# Patient Record
Sex: MALE | Race: WHITE | NOT HISPANIC OR LATINO | Employment: OTHER | URBAN - METROPOLITAN AREA
[De-identification: names, ages, dates, MRNs, and addresses within clinical notes are randomized per-mention and may not be internally consistent; named-entity substitution may affect disease eponyms.]

---

## 2017-02-05 DIAGNOSIS — E78.5 DYSLIPIDEMIA: ICD-10-CM

## 2017-02-05 RX ORDER — ROSUVASTATIN CALCIUM 10 MG/1
10 TABLET, COATED ORAL EVERY EVENING
Qty: 90 TAB | Refills: 4 | Status: SHIPPED | OUTPATIENT
Start: 2017-02-05 | End: 2017-03-15 | Stop reason: SDUPTHER

## 2017-02-13 DIAGNOSIS — N39.0 URINARY TRACT INFECTION, SITE UNSPECIFIED: ICD-10-CM

## 2017-02-13 RX ORDER — NITROFURANTOIN 25; 75 MG/1; MG/1
100 CAPSULE ORAL 2 TIMES DAILY
Qty: 20 CAP | Refills: 3 | Status: SHIPPED | OUTPATIENT
Start: 2017-02-13 | End: 2017-05-01

## 2017-03-13 ENCOUNTER — HOSPITAL ENCOUNTER (OUTPATIENT)
Facility: MEDICAL CENTER | Age: 67
End: 2017-03-13
Attending: FAMILY MEDICINE
Payer: OTHER GOVERNMENT

## 2017-03-13 ENCOUNTER — NON-PROVIDER VISIT (OUTPATIENT)
Dept: INTERNAL MEDICINE | Facility: IMAGING CENTER | Age: 67
End: 2017-03-13
Payer: OTHER GOVERNMENT

## 2017-03-13 DIAGNOSIS — N40.1 BENIGN NON-NODULAR PROSTATIC HYPERPLASIA WITH LOWER URINARY TRACT SYMPTOMS: ICD-10-CM

## 2017-03-13 LAB
APPEARANCE UR: NORMAL
BILIRUB UR STRIP-MCNC: NEGATIVE MG/DL
COLOR UR AUTO: YELLOW
GLUCOSE UR STRIP.AUTO-MCNC: NEGATIVE MG/DL
KETONES UR STRIP.AUTO-MCNC: NEGATIVE MG/DL
LEUKOCYTE ESTERASE UR QL STRIP.AUTO: NORMAL
NITRITE UR QL STRIP.AUTO: NEGATIVE
PH UR STRIP.AUTO: 6 [PH] (ref 5–8)
PROT UR QL STRIP: NEGATIVE MG/DL
RBC UR QL AUTO: NORMAL
SP GR UR STRIP.AUTO: 1.01
UROBILINOGEN UR STRIP-MCNC: NEGATIVE MG/DL

## 2017-03-13 PROCEDURE — 87077 CULTURE AEROBIC IDENTIFY: CPT

## 2017-03-13 PROCEDURE — 87086 URINE CULTURE/COLONY COUNT: CPT

## 2017-03-13 PROCEDURE — 87186 SC STD MICRODIL/AGAR DIL: CPT

## 2017-03-13 PROCEDURE — 81002 URINALYSIS NONAUTO W/O SCOPE: CPT | Performed by: FAMILY MEDICINE

## 2017-03-15 ENCOUNTER — OFFICE VISIT (OUTPATIENT)
Dept: INTERNAL MEDICINE | Facility: IMAGING CENTER | Age: 67
End: 2017-03-15
Payer: OTHER GOVERNMENT

## 2017-03-15 VITALS
WEIGHT: 225 LBS | OXYGEN SATURATION: 97 % | BODY MASS INDEX: 27.4 KG/M2 | RESPIRATION RATE: 12 BRPM | DIASTOLIC BLOOD PRESSURE: 82 MMHG | SYSTOLIC BLOOD PRESSURE: 142 MMHG | TEMPERATURE: 97.5 F | HEIGHT: 76 IN | HEART RATE: 64 BPM

## 2017-03-15 DIAGNOSIS — E78.5 DYSLIPIDEMIA: ICD-10-CM

## 2017-03-15 DIAGNOSIS — N31.2 HYPOTONIC BLADDER: Primary | ICD-10-CM

## 2017-03-15 DIAGNOSIS — J30.1 NON-SEASONAL ALLERGIC RHINITIS DUE TO POLLEN: ICD-10-CM

## 2017-03-15 DIAGNOSIS — N40.1 BENIGN NON-NODULAR PROSTATIC HYPERPLASIA WITH LOWER URINARY TRACT SYMPTOMS: ICD-10-CM

## 2017-03-15 PROCEDURE — 99214 OFFICE O/P EST MOD 30 MIN: CPT | Performed by: FAMILY MEDICINE

## 2017-03-15 RX ORDER — TADALAFIL 5 MG/1
5 TABLET ORAL DAILY
Qty: 90 TAB | Refills: 4 | Status: SHIPPED | OUTPATIENT
Start: 2017-03-15 | End: 2018-11-29 | Stop reason: SDUPTHER

## 2017-03-15 RX ORDER — MOMETASONE FUROATE 50 UG/1
4 SPRAY, METERED NASAL DAILY
Qty: 3 INHALER | Refills: 4 | Status: SHIPPED | OUTPATIENT
Start: 2017-03-15 | End: 2019-08-05

## 2017-03-15 RX ORDER — ROSUVASTATIN CALCIUM 10 MG/1
10 TABLET, COATED ORAL EVERY EVENING
Qty: 90 TAB | Refills: 4
Start: 2017-03-15 | End: 2017-11-01 | Stop reason: SDUPTHER

## 2017-03-15 NOTE — MR AVS SNAPSHOT
"        Checo Hughes   3/15/2017 2:00 PM   Office Visit   MRN: [id number]    Department:  [facility]   Dept Phone:  [phone]    Description:  Male : 1950   Provider:  Trav Cooley M.D.           Allergies as of 3/15/2017     No Known Allergies      You were diagnosed with     Dyslipidemia   [131060]       Benign non-nodular prostatic hyperplasia with lower urinary tract symptoms   [1235475]       Non-seasonal allergic rhinitis due to pollen   [6668054]         Vital Signs     Blood Pressure Pulse Temperature Respirations Height Weight    142/82 mmHg 64 36.4 °C (97.5 °F) 12 1.93 m (6' 4\") 102.059 kg (225 lb)    Body Mass Index Oxygen Saturation Smoking Status             27.40 kg/m2 97% Never Smoker          Basic Information     Date Of Birth Sex Race Ethnicity Preferred Language    1950 Male White Non- English      Problem List              ICD-10-CM Priority Class Noted - Resolved    Sinus complaint R09.89   2012 - Present    Finger joint stiff M25.649   2012 - Present    BPH with obstruction/lower urinary tract symptoms N40.1, N13.8   2012 - Present    Allergic rhinitis due to pollen J30.1   2012 - Present    Other malaise and fatigue R53.81, R53.83   2012 - Present    Encounter for therapeutic drug monitoring Z51.81   2012 - Present    Special screening for malignant neoplasm of prostate Z12.5   2012 - Present    Hyperlipidemia E78.5   Unknown - Present    Eustachian tube dysfunction H69.80   Unknown - Present    Elevated blood pressure reading without diagnosis of hypertension R03.0   Unknown - Present    Diverticulosis K57.90   10/8/2013 - Present    Mixed hyperlipidemia with apolipoprotein E3 variant E78.2   2014 - Present    Carotid atherosclerosis I65.29   2014 - Present    Hypothyroidism E03.9   2014 - Present    Inflamed seborrheic keratosis L82.0   2014 - Present    Left-sided low back pain with sciatica " M54.42   2/2/2015 - Present    Acute urinary retention R33.8   6/12/2015 - Present    UTI (urinary tract infection) N39.0   6/12/2015 - Present    1St degree AV block I44.0   7/14/2015 - Present    S/P TURP (status post transurethral resection of prostate) Z90.79   1/29/2016 - Present    BPH (benign prostatic hypertrophy) N40.0   1/29/2016 - Present    Thickened cIMT R93.8   1/29/2016 - Present    Other specified hypothyroidism E03.8   1/29/2016 - Present    Vitamin D deficiency E55.9   1/29/2016 - Present      Health Maintenance        Date Due Completion Dates    COLONOSCOPY 9/24/2023 9/24/2013 (Done)    Override on 9/24/2013: Done    IMM DTaP/Tdap/Td Vaccine (2 - Td) 9/30/2023 9/30/2013            Current Immunizations     Influenza TIV (IM) 11/1/2013    Influenza Vaccine Quad Inj (Preserved) 9/26/2016, 10/7/2015    SHINGLES VACCINE 9/30/2013    Tdap Vaccine 9/30/2013      Below and/or attached are the medications your provider expects you to take. Review all of your home medications and newly ordered medications with your provider and/or pharmacist. Follow medication instructions as directed by your provider and/or pharmacist. Please keep your medication list with you and share with your provider. Update the information when medications are discontinued, doses are changed, or new medications (including over-the-counter products) are added; and carry medication information at all times in the event of emergency situations     Allergies:  No Known Allergies          Medications  Valid as of: March 16, 2017 -  7:43 AM    Generic Name Brand Name Tablet Size Instructions for use    Aspirin (Tablet Delayed Response) ECOTRIN 81 MG Take 1 Tab by mouth every day.        Cholecalciferol (Cap) Cholecalciferol 1000 UNIT Take 1 Cap by mouth every day.        Levothyroxine Sodium (Tab) SYNTHROID 75 MCG TAKE ONE-HALF (1/2) TABLET DAILY        Mometasone Furoate (Suspension) NASONEX 50 MCG/ACT Spray 4 Sprays in nose every day.          Nitrofurantoin Monohyd Macro (Cap) MACROBID 100 MG Take 1 Cap by mouth 2 times a day.        NON SPECIFIED   Take 1 Each by mouth 2 Times a Day. Omega 3 Fish Oil (735 EPA/ 165 DHA)        Rosuvastatin Calcium (Tab) CRESTOR 10 MG Take 1 Tab by mouth every evening.        Tadalafil (Tab) CIALIS 5 MG Take 1 Tab by mouth every day.        .                 Medicines prescribed today were sent to:     [facility] - [place], [place] - [address]    [address] [address]    Phone: [phone] Fax: [phone]    Open 24 Hours?: No    [facility] [place], [place] - [address] AT [address]    [address] [address]    Phone: [phone] Fax: [phone]    Open 24 Hours?: No    [id number] - [place], [place] - [address]    [address] [place] [address]    Phone: [phone] Fax: [phone]    Open 24 Hours?: No      Medication refill instructions:       If your prescription bottle indicates you have medication refills left, it is not necessary to call your provider’s office. Please contact your pharmacy and they will refill your medication.    If your prescription bottle indicates you do not have any refills left, you may request refills at any time through one of the following ways: The online [employer] system (except Urgent Care), by calling your provider’s office, or by asking your pharmacy to contact your provider’s office with a refill request. Medication refills are processed only during regular business hours and may not be available until the next business day. Your provider may request additional information or to have a follow-up visit with you prior to refilling your medication.   *Please Note: Medication refills are assigned a new Rx number when refilled electronically. Your pharmacy may indicate that no refills were authorized even though a new prescription for the same medication  is available at the pharmacy. Please request the medicine by name with the pharmacy before contacting your provider for a refill.        Other Notes About Your Plan     Colonoscopy-9/2013  Dexa  PSA  12/2/14  [facility]       PREFERRED PHARMACY: [facility]    [address] [address] [place] [address]     P- [phone]           F- [phone]   (is not listed in Epic drop down)  [facility] fax [phone]           MyChart Access Code: Activation code not generated  Current [employer] Status: Active

## 2017-03-15 NOTE — PROGRESS NOTES
SUBJECTIVE:    Chief Complaint   Patient presents with   • Other     Hypotonic bladder   • Allergic Rhinitis   • Hyperlipidemia       Checo Hughes is a 66 y.o. male,   Established Patient     PROBLEM #1-HISTORY OF PRESENT ILLNESS  Existing Problem, but requiring re-evaluation  PATIENT STATEMENT OF PROBLEM - Hypotonic bladder with chronic urinary retention  ONSET - year+  COURSE - he is self catheterizing every 48 hours or so, to help him get a good night sleep.  He has an abnormal UA, and recent culture was positive.  I placed a call to his Urologist to discuss. Patient denies dysuria.   INTENSITY/STATUS/LOCATION/RADIATION - present. Approximately 450 ml residual when he does catheterize.   AGGRAVATORS - BPH, other?   RELIEVERS - ?  TREATMENTS/COMPLIANCE/@GOAL? - as above/ ? / no    PROBLEM #2-HISTORY OF PRESENT ILLNESS  Existing Problem, but requiring re-evaluation  PATIENT STATEMENT OF PROBLEM - Allergic rhinitis  ONSET - years  COURSE - flonase can cause nasal irritation. Counseled.     PROBLEM #3-HISTORY OF PRESENT ILLNESS  Existing Problem, but requiring re-evaluation  PATIENT STATEMENT OF PROBLEM - Dyslipidemia  ONSET - years  COURSE - currently takes Rosuvastatin 10 mg/day, with some mild arm myalgias. Counseled. No CVD/CeVD event history.     No Known Allergies    Patient Active Problem List    Diagnosis Date Noted   • 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/2017   • Benign non-nodular prostatic hyperplasia with lower urinary tract symptoms 03/16/2017   • Non-seasonal allergic rhinitis due to pollen 03/16/2017   • Dyslipidemia 03/16/2017   • S/P TURP (status post transurethral resection of prostate) 01/29/2016   • BPH (benign prostatic hypertrophy) 01/29/2016   • Thickened cIMT 01/29/2016   • Other specified hypothyroidism 01/29/2016   • Vitamin D deficiency 01/29/2016   • 1St degree AV block 07/14/2015   • Acute urinary retention 06/12/2015   • UTI (urinary tract infection) 06/12/2015   • Left-sided low back pain  with sciatica 02/02/2015   • Carotid atherosclerosis 12/08/2014   • Hypothyroidism 12/08/2014   • Inflamed seborrheic keratosis 12/08/2014   • Mixed hyperlipidemia with apolipoprotein E3 variant 11/24/2014   • Diverticulosis 10/08/2013   • Hyperlipidemia    • Eustachian tube dysfunction    • Elevated blood pressure reading without diagnosis of hypertension    • Sinus complaint 11/07/2012   • Finger joint stiff 11/07/2012   • BPH with obstruction/lower urinary tract symptoms 11/07/2012   • Allergic rhinitis due to pollen 11/07/2012   • Other malaise and fatigue 11/07/2012   • Encounter for therapeutic drug monitoring 11/07/2012   • Special screening for malignant neoplasm of prostate 11/07/2012       Outpatient Encounter Prescriptions as of 3/15/2017   Medication Sig Dispense Refill   • rosuvastatin (CRESTOR) 10 MG Tab Take 1 Tab by mouth every evening. 90 Tab 4   • tadalafil (CIALIS) 5 MG tablet Take 1 Tab by mouth every day. 90 Tab 4   • mometasone (NASONEX) 50 MCG/ACT nasal spray Spray 4 Sprays in nose every day. 3 Inhaler 4   • nitrofurantoin monohydr macro (MACROBID) 100 MG Cap Take 1 Cap by mouth 2 times a day. 20 Cap 3   • Cholecalciferol 1000 UNIT Cap Take 1 Cap by mouth every day.     • aspirin EC (ECOTRIN) 81 MG TBEC Take 1 Tab by mouth every day.     • [DISCONTINUED] rosuvastatin (CRESTOR) 10 MG Tab Take 1 Tab by mouth every evening. 90 Tab 4   • levothyroxine (SYNTHROID) 75 MCG Tab TAKE ONE-HALF (1/2) TABLET DAILY 45 Tab 3   • [DISCONTINUED] fluticasone (FLONASE) 50 MCG/ACT nasal spray USE 2 SPRAYS IN EACH NOSTRIL DAILY 3 Bottle 4   • NON SPECIFIED Take 1 Each by mouth 2 Times a Day. Omega 3 Fish Oil (735 EPA/ 165 DHA)       No facility-administered encounter medications on file as of 3/15/2017.       Social History   Substance Use Topics   • Smoking status: Never Smoker    • Smokeless tobacco: Never Used   • Alcohol Use: None       Family History   Problem Relation Age of Onset   • Heart Disease Father   "      Patient's Past, Social, and Family History reviewed and updated by me in EPIC today.    REVIEW OF SYMPTOMS:               Pertinent Positives as above.    All other systems reviewed and negative.     OBJECTIVE:    /82 mmHg  Pulse 64  Temp(Src) 36.4 °C (97.5 °F)  Resp 12  Ht 1.93 m (6' 4\")  Wt 102.059 kg (225 lb)  BMI 27.40 kg/m2  SpO2 97%  Body mass index is 27.4 kg/(m^2).    Well developed, well nourished male, no acute distress, non-ill appearing. Comfortable, appears stated age, pleasant and cooperative  HEAD: atraumatic, normocephalic   EYES: Conjunctiva normal, EOMI, PERRLA, acuity grossly intact.   EARS/NOSE/THROAT: TM's normal, no SSX of infection, no perforation, no hemotympanum, acuity grossly intact. Oropharynx: benign, no lesions noted. Nares: benign.   NECK: supple, no adenopathy, no thyromegaly or nodules, no JVD, no carotid bruits.   CHEST/LUNGS: clear to auscultation and percussion bilaterally. No adventitious breath sounds.   CARDIOVASCULAR: regular rate and rhythm, no murmur. PMI not displaced. Good central and peripheral pulses.   BACK: no CVA tenderness.   ABDOMEN: soft, non-tender, non-distended, no masses, no hepatosplenomegaly. Normal active bowel tones.   : deferred.   Rectal: deferred.   Extremities: warm/well-perfused, no cyanosis, clubbing, or edema.   SKIN: clear, unbroken, no rashes, normal turgor.   Neuro: Mental Status: Alert and Oriented x 3. CN II-XII grossly intact. Gait normal. Non-focal, intact. Normal strength, sensation    ASSESSMENT:    1. Hypotonic bladder     2. Benign non-nodular prostatic hyperplasia with lower urinary tract symptoms  tadalafil (CIALIS) 5 MG tablet   3. Non-seasonal allergic rhinitis due to pollen  mometasone (NASONEX) 50 MCG/ACT nasal spray   4. Dyslipidemia  rosuvastatin (CRESTOR) 10 MG Tab       PLAN:    Total Face-to-Face time spent with patient: 30 minutes  Amount of time spent counseling patient and/or coordinating care: 20 " minutes    The nature of patient counseling as below:  -Patient Education, including below topics:  -Differential Diagnoses and treatment options discussed  -Risks, benefits, alternatives discussed  -Labs reviewed with patient in detail  -Therapeutic Lifestyle Changes discussed    The nature of coordination of care as below:  -Medications added: Cialis for daily use. Nasonex  -Medications discontinued: Flonase  -Medications adjusted: Rosuvastatin reduced to 5 mg/day  -Continue (other) present chronic medications  -Referrals: I have a call placed to his Urologist, Dr. Gary Turcios.   -Other: none  -Seek medical attention immediately if worse    FOLLOW-UP:  -in 2-3 months at my [facility]  -and sooner if test/consult results warrant  And for Health Care Maintenance Exams  And as needed.

## 2017-03-16 ENCOUNTER — TELEPHONE (OUTPATIENT)
Dept: INTERNAL MEDICINE | Facility: IMAGING CENTER | Age: 67
End: 2017-03-16

## 2017-03-16 PROBLEM — N40.1 BENIGN NON-NODULAR PROSTATIC HYPERPLASIA WITH LOWER URINARY TRACT SYMPTOMS: Status: ACTIVE | Noted: 2017-03-16

## 2017-03-16 PROBLEM — J30.1 NON-SEASONAL ALLERGIC RHINITIS DUE TO POLLEN: Status: ACTIVE | Noted: 2017-03-16

## 2017-03-16 PROBLEM — N31.2 HYPOTONIC BLADDER: Status: ACTIVE | Noted: 2017-03-16

## 2017-03-16 PROBLEM — E78.5 DYSLIPIDEMIA: Status: ACTIVE | Noted: 2017-03-16

## 2017-03-16 LAB
BACTERIA UR CULT: ABNORMAL
SIGNIFICANT IND 70042: ABNORMAL
SOURCE SOURCE: ABNORMAL

## 2017-03-16 NOTE — TELEPHONE ENCOUNTER
I discussed Checo's case with his Urologist, Dr. Gary Turcios, today, and explained about the positive urine culture with no dysuria, and the patient's every other day self catheterization.  Dr. Turcios recommended not treating with abx at this time, and to only check urine cultures if patient is symptomatic.  His advice was also to either self-catheterize 4-6 times per day, or not all all, but the every 48 hour catheterization is likely going to cause other problems.  Dr. Turcios will contact patient to arrange repeat appointment for Emre vigil.   Sincerely,  Trav Cooley MD

## 2017-03-16 NOTE — PATIENT INSTRUCTIONS
Current Outpatient Prescriptions Ordered in [facility]   Medication Sig Dispense Refill   • rosuvastatin (CRESTOR) 10 MG Tab Take 1 Tab by mouth every evening. 90 Tab 4   • tadalafil (CIALIS) 5 MG tablet Take 1 Tab by mouth every day. 90 Tab 4   • mometasone (NASONEX) 50 MCG/ACT nasal spray Spray 4 Sprays in nose every day. 3 Inhaler 4   • nitrofurantoin monohydr macro (MACROBID) 100 MG Cap Take 1 Cap by mouth 2 times a day. 20 Cap 3   • Cholecalciferol 1000 UNIT Cap Take 1 Cap by mouth every day.     • aspirin EC (ECOTRIN) 81 MG TBEC Take 1 Tab by mouth every day.     • levothyroxine (SYNTHROID) 75 MCG Tab TAKE ONE-HALF (1/2) TABLET DAILY 45 Tab 3   • NON SPECIFIED Take 1 Each by mouth 2 Times a Day. Omega 3 Fish Oil (735 EPA/ 165 DHA)       No current Epic-ordered facility-administered medications on file.

## 2017-05-01 ENCOUNTER — HOSPITAL ENCOUNTER (OUTPATIENT)
Facility: MEDICAL CENTER | Age: 67
End: 2017-05-01
Attending: FAMILY MEDICINE
Payer: COMMERCIAL

## 2017-05-01 ENCOUNTER — OFFICE VISIT (OUTPATIENT)
Dept: OTHER | Facility: MEDICAL CENTER | Age: 67
End: 2017-05-01
Payer: MEDICARE

## 2017-05-01 VITALS
RESPIRATION RATE: 12 BRPM | TEMPERATURE: 98.6 F | BODY MASS INDEX: 27.35 KG/M2 | HEIGHT: 75 IN | OXYGEN SATURATION: 96 % | WEIGHT: 220 LBS | SYSTOLIC BLOOD PRESSURE: 122 MMHG | HEART RATE: 57 BPM | DIASTOLIC BLOOD PRESSURE: 78 MMHG

## 2017-05-01 DIAGNOSIS — E78.2 MIXED HYPERLIPIDEMIA WITH APOLIPOPROTEIN E3 VARIANT: Primary | ICD-10-CM

## 2017-05-01 DIAGNOSIS — R93.89 ABNORMAL CAROTID ULTRASOUND: ICD-10-CM

## 2017-05-01 DIAGNOSIS — E55.9 VITAMIN D DEFICIENCY: ICD-10-CM

## 2017-05-01 DIAGNOSIS — E66.3 OVERWEIGHT (BMI 25.0-29.9): ICD-10-CM

## 2017-05-01 DIAGNOSIS — I65.29 CAROTID ATHEROSCLEROSIS, UNSPECIFIED LATERALITY: ICD-10-CM

## 2017-05-01 DIAGNOSIS — J30.9 ALLERGIC RHINITIS, UNSPECIFIED ALLERGIC RHINITIS TRIGGER, UNSPECIFIED RHINITIS SEASONALITY: ICD-10-CM

## 2017-05-01 DIAGNOSIS — Z00.00 WELL ADULT EXAM: ICD-10-CM

## 2017-05-01 DIAGNOSIS — N40.1 BENIGN NON-NODULAR PROSTATIC HYPERPLASIA WITH LOWER URINARY TRACT SYMPTOMS: ICD-10-CM

## 2017-05-01 DIAGNOSIS — E03.9 HYPOTHYROIDISM, UNSPECIFIED TYPE: ICD-10-CM

## 2017-05-01 DIAGNOSIS — M72.0 DUPUYTREN'S CONTRACTURE OF LEFT HAND: ICD-10-CM

## 2017-05-01 PROCEDURE — 99214 OFFICE O/P EST MOD 30 MIN: CPT | Performed by: FAMILY MEDICINE

## 2017-05-01 PROCEDURE — 1101F PT FALLS ASSESS-DOCD LE1/YR: CPT | Mod: 8P | Performed by: FAMILY MEDICINE

## 2017-05-01 PROCEDURE — G8598 ASA/ANTIPLAT THER USED: HCPCS | Performed by: FAMILY MEDICINE

## 2017-05-01 PROCEDURE — 4040F PNEUMOC VAC/ADMIN/RCVD: CPT | Mod: 8P | Performed by: FAMILY MEDICINE

## 2017-05-01 PROCEDURE — G8419 CALC BMI OUT NRM PARAM NOF/U: HCPCS | Performed by: FAMILY MEDICINE

## 2017-05-01 PROCEDURE — 1036F TOBACCO NON-USER: CPT | Performed by: FAMILY MEDICINE

## 2017-05-01 PROCEDURE — G8432 DEP SCR NOT DOC, RNG: HCPCS | Performed by: FAMILY MEDICINE

## 2017-05-01 RX ORDER — AZELASTINE 1 MG/ML
1 SPRAY, METERED NASAL 2 TIMES DAILY
Qty: 3 BOTTLE | Refills: 4 | Status: SHIPPED | OUTPATIENT
Start: 2017-05-01 | End: 2019-08-05

## 2017-05-01 RX ORDER — MONTELUKAST SODIUM 10 MG/1
10 TABLET ORAL DAILY
Qty: 90 TAB | Refills: 4 | Status: SHIPPED | OUTPATIENT
Start: 2017-05-01 | End: 2018-05-29

## 2017-05-01 ASSESSMENT — PAIN SCALES - GENERAL: PAINLEVEL: NO PAIN

## 2017-05-01 NOTE — PATIENT INSTRUCTIONS
Current Outpatient Prescriptions Ordered in [facility]   Medication Sig Dispense Refill   • azelastine (ASTELIN) 137 MCG/SPRAY nasal spray [place] 1 Spray in nose 2 times a day. 3 Bottle 4   • montelukast (SINGULAIR) 10 MG Tab Take 1 Tab by mouth every day. 90 Tab 4   • rosuvastatin (CRESTOR) 10 MG Tab Take 1 Tab by mouth every evening. 90 Tab 4   • tadalafil (CIALIS) 5 MG tablet Take 1 Tab by mouth every day. 90 Tab 4   • levothyroxine (SYNTHROID) 75 MCG Tab TAKE ONE-HALF (1/2) TABLET DAILY 45 Tab 3   • Cholecalciferol 1000 UNIT Cap Take 1 Cap by mouth every day.     • aspirin EC (ECOTRIN) 81 MG TBEC Take 1 Tab by mouth every day.     • mometasone (NASONEX) 50 MCG/ACT nasal spray Spray 4 Sprays in nose every day. 3 Inhaler 4   • NON SPECIFIED Take 1 Each by mouth 2 Times a Day. Omega 3 Fish Oil (735 EPA/ 165 DHA)       No current Epic-ordered facility-administered medications on file.

## 2017-05-01 NOTE — PROGRESS NOTES
SUBJECTIVE:    Chief Complaint   Patient presents with   • Hyperlipidemia     & subclinical carotid atherosclerosis   • Hypothyroidism   • Allergic Rhinitis   • Dupuyten's Contraction   • Vitamin D Deficiency       Checo Hughes is a 67 y.o. male,   New Patient to my [facility] (but previously established at my [facility].)    PROBLEM #1-HISTORY OF PRESENT ILLNESS  Existing Problem, but requiring re-evaluation  PATIENT STATEMENT OF PROBLEM - Dyslipidemia, subclinical carotid atherosclerosis, and thickened cIMT  ONSET - years  COURSE - asymptomatic. No CVD/CeVD event history.  He has been on Rosuvastatin 10 mg/day for past 3 months (he was on 5 mg/day for a few months but Tchol christine, so he resumed 10 mg/day). Zetia possibly caused worsening of his allergic rhinitis/sinus symptoms, so he has been off that for at least 3 months.    INTENSITY/STATUS/LOCATION/RADIATION - pending labs  AGGRAVATORS - Multifactorial   RELIEVERS - meds, Therapeutic Lifestyle Changes   TREATMENTS/COMPLIANCE/@GOAL? - same/ good/ pending labs; clinically @ goal    PROBLEM #2-HISTORY OF PRESENT ILLNESS  Existing Problem, but requiring re-evaluation  PATIENT STATEMENT OF PROBLEM - Hypothyroidism  ONSET - years  COURSE - he feels he is euthyroid, due for labs. He takes levothyroxine 37.5 mg/day    PROBLEM #3-HISTORY OF PRESENT ILLNESS  Existing Problem, but requiring re-evaluation  PATIENT STATEMENT OF PROBLEM - Allergic rhinitis and chronic cough/throat clearing  ONSET - months+  COURSE - Flonase was ineffective, and may have caused headaches.  He tried Nasonex w/o success. He denies GERD symptoms, but he is aware that cough could still be GERD related.   INTENSITY/STATUS/LOCATION/RADIATION - mild but annoying  AGGRAVATORS - Multifactorial. He feels the etiology is allergies/sinuses  RELIEVERS - ?  TREATMENTS/COMPLIANCE/@GOAL? - as above/ good/ no     PROBLEM #4-HISTORY OF PRESENT  ILLNESS  Existing Problem, but not previously addressed by me  PATIENT STATEMENT OF PROBLEM - L hand Dupuytren's contracture  ONSET - years  COURSE - slowly worsening, affecting L middle and L ring fingers.  He cannot extend those fingers completely, but he does not feel the symptoms warrant surgery at this time.  He is willing to see a Hand Specialist to discuss options.   INTENSITY/STATUS/LOCATION/RADIATION - mild to moderate/ worsening/ as above  AGGRAVATORS - time  RELIEVERS - none  TREATMENTS/COMPLIANCE/@GOAL? - none/ na/ no     PROBLEM #5-HISTORY OF PRESENT ILLNESS  Existing Problem, but requiring re-evaluation  PATIENT STATEMENT OF PROBLEM - Vitamin D deficiency  ONSET - years  COURSE - he takes Vitamin D 1000 IU/day. Due for labs. Asymptomatic.     No Known Allergies    Patient Active Problem List    Diagnosis Date Noted   • Allergic rhinitis 05/01/2017   • Dupuytren's contracture of left hand 05/01/2017   • Well adult exam 05/01/2017   • Overweight (BMI 25.0-29.9) 05/01/2017   • 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/2017   • Benign non-nodular prostatic hyperplasia with lower urinary tract symptoms 03/16/2017   • Non-seasonal allergic rhinitis due to pollen 03/16/2017   • Dyslipidemia 03/16/2017   • S/P TURP (status post transurethral resection of prostate) 01/29/2016   • BPH (benign prostatic hypertrophy) 01/29/2016   • Thickened cIMT 01/29/2016   • Other specified hypothyroidism 01/29/2016   • Vitamin D deficiency 01/29/2016   • 1St degree AV block 07/14/2015   • Acute urinary retention 06/12/2015   • UTI (urinary tract infection) 06/12/2015   • Left-sided low back pain with sciatica 02/02/2015   • Carotid atherosclerosis 12/08/2014   • Hypothyroidism 12/08/2014   • Inflamed seborrheic keratosis 12/08/2014   • Mixed hyperlipidemia with apolipoprotein E3 variant 11/24/2014   • Diverticulosis 10/08/2013   • Hyperlipidemia    • Eustachian tube dysfunction    • Elevated blood pressure reading without diagnosis of  "hypertension    • Sinus complaint 11/07/2012   • Finger joint stiff 11/07/2012   • BPH with obstruction/lower urinary tract symptoms 11/07/2012   • Allergic rhinitis due to pollen 11/07/2012   • Other malaise and fatigue 11/07/2012   • Encounter for therapeutic drug monitoring 11/07/2012   • Special screening for malignant neoplasm of prostate 11/07/2012       Outpatient Encounter Prescriptions as of 5/1/2017   Medication Sig Dispense Refill   • azelastine (ASTELIN) 137 MCG/SPRAY nasal spray [place] 1 Spray in nose 2 times a day. 3 Bottle 4   • montelukast (SINGULAIR) 10 MG Tab Take 1 Tab by mouth every day. 90 Tab 4   • rosuvastatin (CRESTOR) 10 MG Tab Take 1 Tab by mouth every evening. 90 Tab 4   • tadalafil (CIALIS) 5 MG tablet Take 1 Tab by mouth every day. 90 Tab 4   • levothyroxine (SYNTHROID) 75 MCG Tab TAKE ONE-HALF (1/2) TABLET DAILY 45 Tab 3   • Cholecalciferol 1000 UNIT Cap Take 1 Cap by mouth every day.     • aspirin EC (ECOTRIN) 81 MG TBEC Take 1 Tab by mouth every day.     • mometasone (NASONEX) 50 MCG/ACT nasal spray Spray 4 Sprays in nose every day. 3 Inhaler 4   • [DISCONTINUED] nitrofurantoin monohydr macro (MACROBID) 100 MG Cap Take 1 Cap by mouth 2 times a day. 20 Cap 3   • NON SPECIFIED Take 1 Each by mouth 2 Times a Day. Omega 3 Fish Oil (735 EPA/ 165 DHA)       No facility-administered encounter medications on file as of 5/1/2017.       Social History   Substance Use Topics   • Smoking status: Never Smoker    • Smokeless tobacco: Never Used   • Alcohol Use: None       Family History   Problem Relation Age of Onset   • Heart Disease Father        Patient's Past, Social, and Family History reviewed and updated by me in EPIC today.    REVIEW OF SYMPTOMS:               Pertinent Positives as above.    All other systems reviewed and negative.     OBJECTIVE:    /78 mmHg  Pulse 57  Temp(Src) 37 °C (98.6 °F)  Resp 12  Ht 1.905 m (6' 3\")  Wt 99.791 kg (220 lb)  BMI 27.50 kg/m2  SpO2 " 96%  Body mass index is 27.5 kg/(m^2).    Well developed, well nourished male, no acute distress, non-ill appearing. Comfortable, appears stated age, pleasant and cooperative  HEAD: atraumatic, normocephalic   EYES: Conjunctiva normal, EOMI, PERRLA, acuity grossly intact.   EARS/NOSE/THROAT: TM's normal, no SSX of infection, no perforation, no hemotympanum, acuity grossly intact. Oropharynx: benign, no lesions noted. Nares: benign.   NECK: supple, no adenopathy, no thyromegaly or nodules, no JVD, no carotid bruits.   CHEST/LUNGS: clear to auscultation and percussion bilaterally. No adventitious breath sounds.   CARDIOVASCULAR: regular rate and rhythm, no murmur. PMI not displaced. Good central and peripheral pulses.   BACK: no CVA tenderness.   ABDOMEN: soft, non-tender, non-distended, no masses, no hepatosplenomegaly. Normal active bowel tones.   : deferred.   Rectal: deferred.   Extremities: warm/well-perfused, no cyanosis, clubbing, or edema. Mild Dupuytren's Contractures of L hand Middle and Ring fingers in palm of L hand.   SKIN: clear, unbroken, no rashes, normal turgor.   Neuro: Mental Status: Alert and Oriented x 3. CN II-XII grossly intact. Gait normal. Non-focal, intact. Normal strength, sensation    ASSESSMENT:    1. Mixed hyperlipidemia with apolipoprotein E3 variant  LIPOPROTEIN QT BLOOD BY NMR    CBC WITH DIFFERENTIAL    COMP METABOLIC PANEL    MICROALBUMIN CREAT RATIO URINE    CREATINE KINASE    CRP HIGH SENSITIVE (CARDIAC)    APOLIPOPROTEIN B    LIPOPROTEIN A    TOTAL VASCULAR SCREENING   2. Carotid atherosclerosis, unspecified laterality  TOTAL VASCULAR SCREENING   3. Thickened cIMT     4. Hypothyroidism, unspecified type  TSH    FREE THYROXINE   5. Allergic rhinitis, unspecified allergic rhinitis trigger, unspecified rhinitis seasonality  azelastine (ASTELIN) 137 MCG/SPRAY nasal spray    montelukast (SINGULAIR) 10 MG Tab   6. Dupuytren's contracture of left hand  REFERRAL TO ORTHOPEDICS   7.  "Vitamin D deficiency  VITAMIN D,25 HYDROXY   8. Benign non-nodular prostatic hyperplasia with lower urinary tract symptoms  PROSTATE SPECIFIC AG SCREENING   9. Well adult exam  US-ABDOMEN LIMITED    DX-CHEST-2 VIEWS   10. Overweight (BMI 25.0-29.9)         PLAN:    Total Face-to-Face time spent with patient: 45 minutes  Amount of time spent counseling patient and/or coordinating care: 30 minutes    The nature of patient counseling as below:  -Patient Education, including below topics:  -Differential Diagnoses and treatment options discussed  -Risks, benefits, alternatives discussed  -Therapeutic Lifestyle Changes discussed    The nature of coordination of care as below:  -Medications added: Astelin, Singulair  -Medications discontinued: Med List updated  -Medications adjusted: none  -Continue (other) present chronic medications  -Labs: fasting, today, as above  -Referrals: HAND/ORTHO referral process initiated  -Other: Imaging in preparation for annual [place] [facility] H&P: TVS w/ cIMT, Dx Chest, Abdomin Ultrasound.  It has not been 5 years since 2013 CTCS of \"0\".   -Seek medical attention immediately if worse    FOLLOW-UP:  -in 1 month for annual [facility] H&P  -and sooner if test/consult results warrant  And for Health Care Maintenance Exams  And as needed.                     "

## 2017-05-02 DIAGNOSIS — N40.1 BENIGN NON-NODULAR PROSTATIC HYPERPLASIA WITH LOWER URINARY TRACT SYMPTOMS: ICD-10-CM

## 2017-05-02 DIAGNOSIS — E03.9 HYPOTHYROIDISM, UNSPECIFIED TYPE: ICD-10-CM

## 2017-05-02 DIAGNOSIS — E55.9 VITAMIN D DEFICIENCY: ICD-10-CM

## 2017-05-02 DIAGNOSIS — E78.2 MIXED HYPERLIPIDEMIA WITH APOLIPOPROTEIN E3 VARIANT: ICD-10-CM

## 2017-05-02 LAB
25(OH)D3 SERPL-MCNC: 27 NG/ML (ref 30–100)
ALBUMIN SERPL BCP-MCNC: 4.5 G/DL (ref 3.2–4.9)
ALBUMIN/GLOB SERPL: 1.5 G/DL
ALP SERPL-CCNC: 48 U/L (ref 30–99)
ALT SERPL-CCNC: 20 U/L (ref 2–50)
ANION GAP SERPL CALC-SCNC: 5 MMOL/L (ref 0–11.9)
AST SERPL-CCNC: 29 U/L (ref 12–45)
BASOPHILS # BLD AUTO: 1 % (ref 0–1.8)
BASOPHILS # BLD: 0.06 K/UL (ref 0–0.12)
BILIRUB SERPL-MCNC: 1 MG/DL (ref 0.1–1.5)
BUN SERPL-MCNC: 22 MG/DL (ref 8–22)
CALCIUM SERPL-MCNC: 9.2 MG/DL (ref 8.5–10.5)
CHLORIDE SERPL-SCNC: 102 MMOL/L (ref 96–112)
CK SERPL-CCNC: 203 U/L (ref 0–154)
CO2 SERPL-SCNC: 26 MMOL/L (ref 20–33)
CREAT SERPL-MCNC: 1.05 MG/DL (ref 0.5–1.4)
CREAT UR-MCNC: 176.9 MG/DL
CRP SERPL HS-MCNC: 0.8 MG/L (ref 0–7.5)
EOSINOPHIL # BLD AUTO: 0.21 K/UL (ref 0–0.51)
EOSINOPHIL NFR BLD: 3.5 % (ref 0–6.9)
ERYTHROCYTE [DISTWIDTH] IN BLOOD BY AUTOMATED COUNT: 44.7 FL (ref 35.9–50)
GFR SERPL CREATININE-BSD FRML MDRD: >60 ML/MIN/1.73 M 2
GLOBULIN SER CALC-MCNC: 3 G/DL (ref 1.9–3.5)
GLUCOSE SERPL-MCNC: 86 MG/DL (ref 65–99)
HCT VFR BLD AUTO: 46.3 % (ref 42–52)
HGB BLD-MCNC: 14.9 G/DL (ref 14–18)
IMM GRANULOCYTES # BLD AUTO: 0.01 K/UL (ref 0–0.11)
IMM GRANULOCYTES NFR BLD AUTO: 0.2 % (ref 0–0.9)
LYMPHOCYTES # BLD AUTO: 1.78 K/UL (ref 1–4.8)
LYMPHOCYTES NFR BLD: 29.9 % (ref 22–41)
MCH RBC QN AUTO: 29.5 PG (ref 27–33)
MCHC RBC AUTO-ENTMCNC: 32.2 G/DL (ref 33.7–35.3)
MCV RBC AUTO: 91.7 FL (ref 81.4–97.8)
MICROALBUMIN UR-MCNC: <0.7 MG/DL
MICROALBUMIN/CREAT UR: NORMAL MG/G (ref 0–30)
MONOCYTES # BLD AUTO: 0.59 K/UL (ref 0–0.85)
MONOCYTES NFR BLD AUTO: 9.9 % (ref 0–13.4)
NEUTROPHILS # BLD AUTO: 3.31 K/UL (ref 1.82–7.42)
NEUTROPHILS NFR BLD: 55.5 % (ref 44–72)
NRBC # BLD AUTO: 0.03 K/UL
NRBC BLD AUTO-RTO: 0.5 /100 WBC
PLATELET # BLD AUTO: 249 K/UL (ref 164–446)
PMV BLD AUTO: 11.7 FL (ref 9–12.9)
POTASSIUM SERPL-SCNC: 4.5 MMOL/L (ref 3.6–5.5)
PROT SERPL-MCNC: 7.5 G/DL (ref 6–8.2)
PSA SERPL-MCNC: 2.59 NG/ML (ref 0–4)
RBC # BLD AUTO: 5.05 M/UL (ref 4.7–6.1)
SODIUM SERPL-SCNC: 133 MMOL/L (ref 135–145)
T4 FREE SERPL-MCNC: 1.01 NG/DL (ref 0.53–1.43)
TSH SERPL DL<=0.005 MIU/L-ACNC: 2.95 UIU/ML (ref 0.3–3.7)
WBC # BLD AUTO: 6 K/UL (ref 4.8–10.8)

## 2017-05-03 LAB
APO B100 SERPL-MCNC: 91 MG/DL (ref 55–140)
LPA SERPL-MCNC: 3 MG/DL

## 2017-05-07 LAB
CHOLEST SERPL-MCNC: 175 MG/DL (ref 100–199)
HDL PARTICAL NO Q4363: 30.7 UMOL/L
HDL SERPL QN: 8.9 NM
HDLC SERPL-MCNC: 45 MG/DL
HLD.LARGE SERPL-SCNC: 4.4 UMOL/L
LDL MED SERPL QN: 20.5 NM
LDL SERPL QN: 20.5 NM
LDL SERPL-SCNC: 1286 NMOL/L
LDL SMALL SERPL-SCNC: 668 NMOL/L
LDL SMALL SERPL-SCNC: 668 NMOL/L
LDLC SERPL CALC-MCNC: 107 MG/DL (ref 0–99)
LP IR SCORE Q4364: 34
TRIGL SERPL-MCNC: 115 MG/DL (ref 0–149)
VLDL LARGE SERPL-SCNC: <0.8 NMOL/L
VLDL SERPL QN: 42.6 NM

## 2017-05-16 ENCOUNTER — HOSPITAL ENCOUNTER (OUTPATIENT)
Dept: RADIOLOGY | Facility: MEDICAL CENTER | Age: 67
End: 2017-05-16
Attending: FAMILY MEDICINE
Payer: COMMERCIAL

## 2017-05-16 ENCOUNTER — APPOINTMENT (OUTPATIENT)
Dept: RADIOLOGY | Facility: MEDICAL CENTER | Age: 67
End: 2017-05-16
Attending: FAMILY MEDICINE
Payer: OTHER GOVERNMENT

## 2017-05-16 DIAGNOSIS — Z00.00 WELL ADULT EXAM: ICD-10-CM

## 2017-05-16 DIAGNOSIS — I65.29 CAROTID ATHEROSCLEROSIS, UNSPECIFIED LATERALITY: ICD-10-CM

## 2017-05-16 DIAGNOSIS — E78.2 MIXED HYPERLIPIDEMIA WITH APOLIPOPROTEIN E3 VARIANT: ICD-10-CM

## 2017-05-16 PROCEDURE — 4410556 CT-CARDIAC SCORING

## 2017-05-16 PROCEDURE — 76700 US EXAM ABDOM COMPLETE: CPT

## 2017-05-16 PROCEDURE — 71020 DX-CHEST-2 VIEWS: CPT

## 2017-05-16 PROCEDURE — 306734 HCHG ADVANCED VASCULAR SCREENING

## 2017-06-01 ENCOUNTER — OFFICE VISIT (OUTPATIENT)
Dept: OTHER | Facility: MEDICAL CENTER | Age: 67
End: 2017-06-01

## 2017-06-01 DIAGNOSIS — I49.3 ASYMPTOMATIC PVCS: ICD-10-CM

## 2017-06-01 DIAGNOSIS — N28.1 SIMPLE RENAL CYST: ICD-10-CM

## 2017-06-01 DIAGNOSIS — K83.8 BILIARY SLUDGE: ICD-10-CM

## 2017-06-01 DIAGNOSIS — E66.3 OVERWEIGHT (BMI 25.0-29.9): ICD-10-CM

## 2017-06-01 DIAGNOSIS — N42.83 CYST OF PROSTATE: ICD-10-CM

## 2017-06-01 DIAGNOSIS — I44.0 1ST DEGREE AV BLOCK: ICD-10-CM

## 2017-06-01 DIAGNOSIS — K80.20 CALCULUS OF GALLBLADDER WITHOUT CHOLECYSTITIS WITHOUT OBSTRUCTION: ICD-10-CM

## 2017-06-01 DIAGNOSIS — N31.2 HYPOTONIC BLADDER: ICD-10-CM

## 2017-06-01 DIAGNOSIS — R93.89 ABNORMAL CHEST X-RAY: ICD-10-CM

## 2017-06-01 DIAGNOSIS — N40.1 BPH WITH OBSTRUCTION/LOWER URINARY TRACT SYMPTOMS: ICD-10-CM

## 2017-06-01 DIAGNOSIS — Z00.00 ANNUAL PHYSICAL EXAM: Primary | ICD-10-CM

## 2017-06-01 DIAGNOSIS — R93.89 ABNORMAL CAROTID ULTRASOUND: ICD-10-CM

## 2017-06-01 DIAGNOSIS — I65.21 CAROTID ATHEROSCLEROSIS, RIGHT: ICD-10-CM

## 2017-06-01 DIAGNOSIS — N13.8 BPH WITH OBSTRUCTION/LOWER URINARY TRACT SYMPTOMS: ICD-10-CM

## 2017-06-01 DIAGNOSIS — E78.5 DYSLIPIDEMIA: ICD-10-CM

## 2017-06-01 NOTE — PROGRESS NOTES
"[facility] [employer] PROGRAM EXECUTIVE HISTORY AND PHYSICAL  Performed by Dr. Trav Cooley    SUBJECTIVE:    Chief Complaint   Patient presents with   • Executive Physical   • Hyperlipidemia     & subclinical R carotid atherosclerosis in 2015   • Other     Thickened cIMT (79 vs 67 years)   • Other     Simple L Renal cyst   • Other     BPH with increased post-void residual   • Other     Prostate Cyst   • Other     Cholelithiasis with Sludge; asymptomatic   • Other     Post-Trauma (distant) changes on Dx Chest   • Other     Due for Prevnar       Checo Hughes is a 67 y.o. male,   Established Patient @ [employer] On license of [facility]    Preventive medicine issues discussed:  abuse, aspirin, dental, Alcohol, Tobacco, HIV/AIDS, injuries, mental health/depression, nutrition, exercise, occupational health, sexual behavior, UV exposure, violence and guns, advanced directives, Cancer Screening. Vaccines.      PROBLEM #1-HISTORY OF PRESENT ILLNESS  Existing Problem  PATIENT STATEMENT OF PROBLEM - Dyslipidemia and subclinical R mild carotid atherosclerosis per 2015 TVS  ONSET - years  COURSE - asymptomatic. Tolerating Rosuvastatin 10 mg/day now.  Previous CTCS was \"0\"; not repeated today as too soon.  cIMT improved, but still abnormal with Vascular Age of 79 vs Chronological Age of 67 years.    INTENSITY/STATUS/LOCATION/RADIATION - variable/ present, asymptomatic  AGGRAVATORS - Multifactorial   RELIEVERS - Therapeutic Lifestyle Changes and medications  TREATMENTS/COMPLIANCE/@GOAL? - same/ could improve Therapeutic Lifestyle Changes more/ clinically yes    PROBLEM #2-HISTORY OF PRESENT ILLNESS  New & Existing Problem  PATIENT STATEMENT OF PROBLEM - Other Imaging abnormalities  ONSET - identified today  COURSE - Asymptomatic incidental simple L renal cyst.  BPH with post-void residual of 540 ml (known). Prostate cyst.  Cholelithiasis with sludge.  Post-trauma (distant) changes seen on Dx Chest. Counseled. "     PROBLEM #3-HISTORY OF PRESENT ILLNESS  New Problem  PATIENT STATEMENT OF PROBLEM - Due for Prevnar vaccine  ONSET - age 67 years  COURSE - counseled.     No Known Allergies    Patient Active Problem List    Diagnosis Date Noted   • [facility] Executive History and Physical 06/05/2017   • Thickened cIMT (Vascular age of 79 vs Chronologica Age of 67, in 2017) 06/05/2017   • Simple renal cyst, left 06/05/2017   • Cyst of prostate 06/05/2017   • Calculus of gallbladder without cholecystitis without obstruction 06/05/2017   • Biliary sludge 06/05/2017   • Abnormal chest x-ray 2o trauma 06/05/2017   • Asymptomatic PVCs 06/05/2017   • Allergic rhinitis 05/01/2017   • Dupuytren's contracture of left hand 05/01/2017   • Well adult exam 05/01/2017   • Overweight (BMI 25.0-29.9) 05/01/2017   • 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/2017   • Benign non-nodular prostatic hyperplasia with lower urinary tract symptoms 03/16/2017   • Non-seasonal allergic rhinitis due to pollen 03/16/2017   • Dyslipidemia 03/16/2017   • S/P TURP (status post transurethral resection of prostate) 01/29/2016   • BPH (benign prostatic hypertrophy) 01/29/2016   • Other specified hypothyroidism 01/29/2016   • Vitamin D deficiency 01/29/2016   • 1St degree AV block 07/14/2015   • Acute urinary retention 06/12/2015   • UTI (urinary tract infection) 06/12/2015   • Left-sided low back pain with sciatica 02/02/2015   • Carotid atherosclerosis (Very mild smooth plaque in the right ICA, per U/S, in 2015) 12/08/2014   • Hypothyroidism 12/08/2014   • Inflamed seborrheic keratosis 12/08/2014   • Mixed hyperlipidemia with apolipoprotein E3 variant 11/24/2014   • Diverticulosis 10/08/2013   • Hyperlipidemia    • Eustachian tube dysfunction    • Sinus complaint 11/07/2012   • Finger joint stiff 11/07/2012   • BPH with obstruction/lower urinary tract symptoms 11/07/2012   • Allergic rhinitis due to pollen 11/07/2012   • Other malaise and fatigue 11/07/2012   • Encounter for  "therapeutic drug monitoring 11/07/2012   • Special screening for malignant neoplasm of prostate 11/07/2012       Current Outpatient Prescriptions on File Prior to Visit   Medication Sig Dispense Refill   • azelastine (ASTELIN) 137 MCG/SPRAY nasal spray [place] 1 Spray in nose 2 times a day. 3 Bottle 4   • montelukast (SINGULAIR) 10 MG Tab Take 1 Tab by mouth every day. 90 Tab 4   • rosuvastatin (CRESTOR) 10 MG Tab Take 1 Tab by mouth every evening. 90 Tab 4   • tadalafil (CIALIS) 5 MG tablet Take 1 Tab by mouth every day. 90 Tab 4   • mometasone (NASONEX) 50 MCG/ACT nasal spray Spray 4 Sprays in nose every day. 3 Inhaler 4   • levothyroxine (SYNTHROID) 75 MCG Tab TAKE ONE-HALF (1/2) TABLET DAILY 45 Tab 3   • aspirin EC (ECOTRIN) 81 MG TBEC Take 1 Tab by mouth every day.     • NON SPECIFIED Take 1 Each by mouth 2 Times a Day. Omega 3 Fish Oil (735 EPA/ 165 DHA)       No current facility-administered medications on file prior to visit.       Social History     Social History   • Marital Status: Single     Spouse Name: N/A   • Number of Children: N/A   • Years of Education: N/A     Occupational History   • Not on file.     Social History Main Topics   • Smoking status: Never Smoker    • Smokeless tobacco: Never Used   • Alcohol Use: Not on file   • Drug Use: Not on file   • Sexual Activity: Not on file     Other Topics Concern   • Not on file     Social History Narrative       Family History   Problem Relation Age of Onset   • Heart Disease Father        Patient's Past, Social, and Family History reviewed     REVIEW OF SYMPTOMS:               Pertinent Positives as above.    All other systems reviewed and negative.    OBJECTIVE:    /74 mmHg  Pulse 63  Temp(Src) 36.6 °C (97.8 °F)  Resp 14  Ht 1.892 m (6' 2.5\")  Wt 100.699 kg (222 lb)  BMI 28.13 kg/m2  SpO2 96%  Body mass index is 28.13 kg/(m^2).    Well developed, well nourished male, no acute distress, non-ill appearing. Comfortable, appears stated age, " pleasant and cooperative, Alert and Oriented x 3.   HEAD: atraumatic, normocephalic   EYES: Conjunctiva normal, EOMI, PERRLA, acuity grossly intact.   EARS/NOSE/THROAT: TM's normal, no SSX of infection, no perforation, no hemotympanum, acuity grossly intact. Oropharynx: benign, no lesions noted. Nares: benign.   NECK: supple, no adenopathy, no thyromegaly or nodules, no JVD, no carotid bruits.   CHEST/LUNGS: clear to auscultation and percussion bilaterally. No adventitious breath sounds.   CARDIOVASCULAR: regular rate and rhythm, no murmur. PMI not displaced. Good central and peripheral pulses.   BACK: no CVA tenderness.   ABDOMEN: soft, non-tender, non-distended, no masses, no hepatosplenomegaly. Normal active bowel tones.   : deferred.   Rectal: deferred  Extremities: warm/well-perfused, no cyanosis, clubbing, or edema.   SKIN: clear, unbroken, no rashes, normal turgor.   Neuro: Mental Status: Alert and Oriented x 3. CN II-XII grossly intact. Gait normal. Non-focal, intact. Normal strength, sensation    EXERCISE STRESS TEST REPORT:    Interpreted by me    INTERPRETATION:  Patient achieved 85% of maximum predicted heart rate with physiological response in blood pressure and no associated ST segment depression.   Known pre-testing Sinus Bradycardia with First Degree AV Block. Specifically no associated ST elevation, no significant or symptomatic ventricular extrasystoles, no ventricular tachycardia, no new atrial fibrillation or supraventricular tachycardia, and  no new heart blocks.  He did have frequent PVC's, including Bigeminy, which were asymptomatic. Patient denied chest pain, severe dyspnea, dizziness, or ataxia.     CONCLUSION:  Normal Exercise Stress Test indicating low probability of flow-limiting coronary artery disease.  Known resting sinus bradycardia and First Degree AV Block.  Frequent PVC's during exercise and recovery.     ASSESSMENT:    Encounter Diagnoses   Name Primary?   • [facility]  "History and Physical Yes   • Dyslipidemia    • Carotid atherosclerosis, right    • Thickened cIMT (Vascular age of 79 vs Chronologica Age of 67, in 2017)    • Simple renal cyst, left    • BPH with obstruction/lower urinary tract symptoms    • Cyst of prostate    • Calculus of gallbladder without cholecystitis without obstruction    • Biliary sludge    • Abnormal chest x-ray 2o trauma    • Overweight (BMI 25.0-29.9)    • Hypotonic bladder    • 1St degree AV block    • Asymptomatic PVCs        PLAN:    Total Face-to-Face time spent with patient: 75 minutes  Amount of time spent counseling patient and/or coordinating care: 50 minutes    The nature of patient counseling as below:  -Patient Education  -Differential Diagnoses and treatment options discussed  -Risks, benefits, alternatives discussed  -Labs reviewed with patient in detail  -Imaging/ETT/PFT/vision/hearing reports reviewed with patient in detail  -Health Maintenance Exam issues discussed  -Exercise  -Dietary recommendations discussed, with continued shift toward eating \"real food, mostly plants, not too much.\"   -Weight Loss strategies discussed with goal of BMI < 25  -Therapeutic Lifestyle Changes discussed    The nature of coordination of care as below:  -Medications added: none. Continue present medications.   -Medications discontinued: none  -Medications adjusted: none  -Continue present chronic medications  -Referrals: none  -Other: I recommend patient obtain Prevnar Vaccine at his pharmacy ASAP  -Seek medical attention immediately if worse    FOLLOW-UP:  -With Dr. Cooley in 3 months     "

## 2017-06-01 NOTE — Clinical Note
"June 5, 2017        Checo Hughes  [address] [place] [address]        Dear Checo:    Thank you for participating in [facility].  I enjoyed performing your Executive History and Physical examination.  We covered a great deal of information, and I have summarized my Assessment and Plan below.  Please carefully review all the information in this packet.  I recommend we see each other again in about 3 to 6 months to assess how you are progressing.    Overall, I feel you are doing remarkably well.  I am particularly pleased with your active lifestyle.  We did identify, however, several issues that warrant further discussion.  We know you have some Dyslipidemia/Hyperlipidemia, and a thickened cIMT (i.e. Increased Vascular age), although the vascular age had improved.  I do recommend redoubling your efforts at Therapeutic Lifestyle Changes in the form of eating \"real food, mostly plants, not too much,\" striving for a normal weight (i.e. BMI of less than 25), daily exercise, actively managing stress, 7-8 hours of sleep per night, striving for a loving social environment, and having an altruistic purpose in life.  There were some incidental, asymptomatic findings via Imaging today, as we discussed, namely a simple left kidney cyst, increased bladder retention of urine, a prostate cyst, gallbladder \"sludge\", and post-traumatic chest x-ray changes.  I do not feel any of these imaging findings warrant any specific follow-up, but certainly if you become symptomatic from the gallbladder disease, or more symptomatic with your urinary retention, please seek medical attention.  Also, please obtain a PREVNAR vaccine from your pharmacy, and notify me when you have completed this. If you have any questions or concerns, please don't hesitate to call. I would like to see you back in 3 to 6 months for a follow-up.        Sincerely,        Trav Cooley, " "M.D.          ASSESSMENT:    Encounter Diagnoses   Name Primary?   • [facility] History and Physical Yes   • Dyslipidemia    • Carotid atherosclerosis, right    • Thickened cIMT (Vascular age of 79 vs Chronological Age of 67, in 2017)    • Simple renal cyst, left    • BPH with obstruction/lower urinary tract symptoms    • Cyst of prostate    • Calculus of gallbladder without cholecystitis without obstruction    • Biliary sludge    • Abnormal chest x-ray 2o trauma    • Overweight (BMI 25.0-29.9)    • Hypotonic bladder    • 1St degree AV block    • Asymptomatic PVCs    PLAN:  Total Face-to-Face time spent with patient: 75 minutes  Amount of time spent counseling patient and/or coordinating care: 50 minutes  The nature of patient counseling as below:  -Patient Education  -Differential Diagnoses and treatment options discussed  -Risks, benefits, alternatives discussed  -Labs reviewed with patient in detail  -Imaging/ETT/PFT/vision/hearing reports reviewed with patient in detail  -Health Maintenance Exam issues discussed  -Exercise  -Dietary recommendations discussed, with continued shift toward eating \"real food, mostly plants, not too much.\"   -Weight Loss strategies discussed with goal of BMI < 25  -Therapeutic Lifestyle Changes discussed  The nature of coordination of care as below:  -Medications added: none. Continue present medications.   -Medications discontinued: none  -Medications adjusted: none  -Continue present chronic medications  -Referrals: none  -Other: I recommend patient obtain Prevnar Vaccine at his pharmacy ASAP  -Seek medical attention immediately if worse    FOLLOW-UP:  -With Dr. Cooley in 3 months     "

## 2017-06-05 VITALS
DIASTOLIC BLOOD PRESSURE: 74 MMHG | HEART RATE: 63 BPM | TEMPERATURE: 97.8 F | BODY MASS INDEX: 27.6 KG/M2 | WEIGHT: 222 LBS | OXYGEN SATURATION: 96 % | RESPIRATION RATE: 14 BRPM | SYSTOLIC BLOOD PRESSURE: 124 MMHG | HEIGHT: 75 IN

## 2017-06-05 PROBLEM — N28.1 SIMPLE RENAL CYST: Status: ACTIVE | Noted: 2017-06-05

## 2017-06-05 PROBLEM — K83.8 BILIARY SLUDGE: Status: ACTIVE | Noted: 2017-06-05

## 2017-06-05 PROBLEM — K80.20 CALCULUS OF GALLBLADDER WITHOUT CHOLECYSTITIS WITHOUT OBSTRUCTION: Status: ACTIVE | Noted: 2017-06-05

## 2017-06-05 PROBLEM — Z00.00 ANNUAL PHYSICAL EXAM: Status: ACTIVE | Noted: 2017-06-05

## 2017-06-05 PROBLEM — R93.89 ABNORMAL CHEST X-RAY: Status: ACTIVE | Noted: 2017-06-05

## 2017-06-05 PROBLEM — I49.3 ASYMPTOMATIC PVCS: Status: ACTIVE | Noted: 2017-06-05

## 2017-06-05 PROBLEM — R93.89 ABNORMAL CAROTID ULTRASOUND: Status: ACTIVE | Noted: 2017-06-05

## 2017-06-05 PROBLEM — N42.83 CYST OF PROSTATE: Status: ACTIVE | Noted: 2017-06-05

## 2017-06-05 NOTE — PATIENT INSTRUCTIONS
Current Outpatient Prescriptions Ordered in [facility]   Medication Sig Dispense Refill   • azelastine (ASTELIN) 137 MCG/SPRAY nasal spray [place] 1 Spray in nose 2 times a day. 3 Bottle 4   • montelukast (SINGULAIR) 10 MG Tab Take 1 Tab by mouth every day. 90 Tab 4   • rosuvastatin (CRESTOR) 10 MG Tab Take 1 Tab by mouth every evening. 90 Tab 4   • tadalafil (CIALIS) 5 MG tablet Take 1 Tab by mouth every day. 90 Tab 4   • mometasone (NASONEX) 50 MCG/ACT nasal spray Spray 4 Sprays in nose every day. 3 Inhaler 4   • levothyroxine (SYNTHROID) 75 MCG Tab TAKE ONE-HALF (1/2) TABLET DAILY 45 Tab 3   • aspirin EC (ECOTRIN) 81 MG TBEC Take 1 Tab by mouth every day.     • NON SPECIFIED Take 1 Each by mouth 2 Times a Day. Omega 3 Fish Oil (735 EPA/ 165 DHA)       No current Epic-ordered facility-administered medications on file.

## 2017-08-02 ENCOUNTER — TELEPHONE (OUTPATIENT)
Dept: OTHER | Facility: MEDICAL CENTER | Age: 67
End: 2017-08-02

## 2017-08-02 NOTE — TELEPHONE ENCOUNTER
----- Message from Trav Cooley M.D. sent at 8/1/2017 11:40 AM PDT -----  Regarding: FW: Non-Urgent Medical Question  Contact: [phone]  T,  Please speak with Checo about a blood draw appointment.  Thanks,  QPMD   ----- Message -----     From: Checo Hughes     Sent: 7/31/2017   7:48 PM       To: Trav Cooley M.D.  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 I called again and again no answer and I did not leave a message.     I would like to do a blood draw some morning this week. I have all the viles and instructions, just need a good sticker.    Checo Hughes

## 2017-08-03 ENCOUNTER — APPOINTMENT (OUTPATIENT)
Dept: OTHER | Facility: MEDICAL CENTER | Age: 67
End: 2017-08-03
Payer: MEDICARE

## 2017-08-17 ENCOUNTER — OFFICE VISIT (OUTPATIENT)
Dept: OTHER | Facility: MEDICAL CENTER | Age: 67
End: 2017-08-17
Payer: MEDICARE

## 2017-08-17 VITALS
SYSTOLIC BLOOD PRESSURE: 120 MMHG | RESPIRATION RATE: 14 BRPM | TEMPERATURE: 98.3 F | DIASTOLIC BLOOD PRESSURE: 70 MMHG | HEIGHT: 75 IN | OXYGEN SATURATION: 97 % | BODY MASS INDEX: 27.98 KG/M2 | WEIGHT: 225 LBS | HEART RATE: 57 BPM

## 2017-08-17 DIAGNOSIS — F43.0 STRESS REACTION: ICD-10-CM

## 2017-08-17 DIAGNOSIS — S86.911A STRAIN OF RIGHT KNEE, INITIAL ENCOUNTER: ICD-10-CM

## 2017-08-17 DIAGNOSIS — S80.01XA CONTUSION OF KNEE, RIGHT: Primary | ICD-10-CM

## 2017-08-17 PROCEDURE — 99214 OFFICE O/P EST MOD 30 MIN: CPT | Performed by: FAMILY MEDICINE

## 2017-08-17 NOTE — PROGRESS NOTES
SUBJECTIVE:    Chief Complaint   Patient presents with   • Knee Injury       Checo Hughes is a 67 y.o. male,   Established Patient     PROBLEM #1-HISTORY OF PRESENT ILLNESS  New Problem  PATIENT STATEMENT OF PROBLEM - R knee injury  ONSET - one day  COURSE - ground level trip and fall with R knee coming in direct contact with hard surface.  He is able to walk, but limping 2o some pain.  He does feel today is better than yesterday. No R knee injury history.   INTENSITY/STATUS/LOCATION/RADIATION - mild to moderate/ improving spontaneously/ R knee / no  AGGRAVATORS - as above, weight bearing  RELIEVERS - time, ibuprofen  TREATMENTS/COMPLIANCE/@GOAL? - same/ good/ no     PROBLEM #2-HISTORY OF PRESENT ILLNESS  New Problem  PATIENT STATEMENT OF PROBLEM - Stress  ONSET - week+  COURSE - his mother had recent fall, broke her arm, is 89 years old, is not eating, is now in hospice. Stressful for Checo. Counseled.     No Known Allergies    Patient Active Problem List    Diagnosis Date Noted   • [employer] History and Physical 06/05/2017   • Thickened cIMT (Vascular age of 79 vs Chronolog[employer] Age of 67, in 2017) 06/05/2017   • Simple renal cyst, left 06/05/2017   • Cyst of prostate 06/05/2017   • Calculus of gallbladder without cholecystitis without obstruction 06/05/2017   • Biliary sludge 06/05/2017   • Abnormal chest x-ray 2o trauma 06/05/2017   • Asymptomatic PVCs 06/05/2017   • Allergic rhinitis 05/01/2017   • Dupuytren's contracture of left hand 05/01/2017   • Well adult exam 05/01/2017   • Overweight (BMI 25.0-29.9) 05/01/2017   • 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/2017   • Benign non-nodular prostatic hyperplasia with lower urinary tract symptoms 03/16/2017   • Non-seasonal allergic rhinitis due to pollen 03/16/2017   • Dyslipidemia 03/16/2017   • S/P TURP (status post transurethral resection of prostate) 01/29/2016   • BPH (benign prostatic hypertrophy) 01/29/2016   • Other specified hypothyroidism 01/29/2016    • Vitamin D deficiency 01/29/2016   • 1St degree AV block 07/14/2015   • Acute urinary retention 06/12/2015   • UTI (urinary tract infection) 06/12/2015   • Left-sided low back pain with sciatica 02/02/2015   • Carotid atherosclerosis (Very mild smooth plaque in the right ICA, per U/S, in 2015) 12/08/2014   • Hypothyroidism 12/08/2014   • Inflamed seborrheic keratosis 12/08/2014   • Mixed hyperlipidemia with apolipoprotein E3 variant 11/24/2014   • Diverticulosis 10/08/2013   • Hyperlipidemia    • Eustachian tube dysfunction    • Sinus complaint 11/07/2012   • Finger joint stiff 11/07/2012   • BPH with obstruction/lower urinary tract symptoms 11/07/2012   • Allergic rhinitis due to pollen 11/07/2012   • Other malaise and fatigue 11/07/2012   • Encounter for therapeutic drug monitoring 11/07/2012   • Special screening for malignant neoplasm of prostate 11/07/2012       Outpatient Encounter Prescriptions as of 8/17/2017   Medication Sig Dispense Refill   • azelastine (ASTELIN) 137 MCG/SPRAY nasal spray [place] 1 Spray in nose 2 times a day. 3 Bottle 4   • montelukast (SINGULAIR) 10 MG Tab Take 1 Tab by mouth every day. 90 Tab 4   • rosuvastatin (CRESTOR) 10 MG Tab Take 1 Tab by mouth every evening. 90 Tab 4   • tadalafil (CIALIS) 5 MG tablet Take 1 Tab by mouth every day. 90 Tab 4   • mometasone (NASONEX) 50 MCG/ACT nasal spray Spray 4 Sprays in nose every day. 3 Inhaler 4   • levothyroxine (SYNTHROID) 75 MCG Tab TAKE ONE-HALF (1/2) TABLET DAILY 45 Tab 3   • aspirin EC (ECOTRIN) 81 MG TBEC Take 1 Tab by mouth every day.     • NON SPECIFIED Take 1 Each by mouth 2 Times a Day. Omega 3 Fish Oil (735 EPA/ 165 DHA)       No facility-administered encounter medications on file as of 8/17/2017.       Social History   Substance Use Topics   • Smoking status: Never Smoker    • Smokeless tobacco: Never Used   • Alcohol Use: Not on file       Family History   Problem Relation Age of Onset   • Heart Disease Father        Patient's  "Past, Social, and Family History reviewed and updated by me in EPIC today.    REVIEW OF SYMPTOMS:               Pertinent Positives as above.    All other systems reviewed and negative.     OBJECTIVE:    /70 mmHg  Pulse 57  Temp(Src) 36.8 °C (98.3 °F)  Resp 14  Ht 1.892 m (6' 2.5\")  Wt 102.059 kg (225 lb)  BMI 28.51 kg/m2  SpO2 97%  Body mass index is 28.51 kg/(m^2).    Well developed, well nourished male, no acute distress, non-ill appearing. Comfortable, appears stated age, pleasant and cooperative  HEAD: atraumatic, normocephalic   EYES: Conjunctiva normal, extra-occular movements intact, PERRLA, acuity grossly intact.   CHEST/LUNGS: clear to auscultation and percussion bilaterally. No adventitious breath sounds.   CARDIOVASCULAR: regular rate and rhythm, no murmur. Point of maximum intensity not displaced. Good central and peripheral pulses.   Extremities: warm/well-perfused, no cyanosis, clubbing. Mild R anterior knee swelling.  Mild pain to palpation of R patella w/o crepitus.  Decreased R knee flexion 2o pain.  No evidence of ligamentous laxity. Distal strength, sensation, circulation intact.    SKIN: clear, unbroken, no rashes, normal turgor.   Neuro: Mental Status: Alert and Oriented x 3. CN II-XII grossly intact. Gait normal. Non-focal, intact. Normal strength, sensation    ASSESSMENT:    1. Contusion of knee, right     2. Strain of right knee, initial encounter     3. Stress reaction         PLAN:    Total Face-to-Face time spent with patient: 30 minutes  Amount of time spent counseling patient and/or coordinating care: 20 minutes    The nature of patient counseling as below:  -Patient Education, including below topics:  -Differential Diagnoses and treatment options discussed  -Risks, benefits, alternatives discussed  -Rest/ Ice/ close observation/ OTC medications    The nature of coordination of care as below:  -Medications added: Continue Ibuprofen for one week, maximum 2400 " mg/day  -Medications discontinued: none  -Medications adjusted: none  -Continue (other) present chronic medications  -Seek medical attention immediately if worse    FOLLOW-UP:  -in 1 week  -and sooner if test/consult results warrant  And for Health Care Maintenance Exams  And as needed.

## 2017-08-17 NOTE — PATIENT INSTRUCTIONS
Current Outpatient Prescriptions Ordered in [facility]   Medication Sig Dispense Refill   • azelastine (ASTELIN) 137 MCG/SPRAY nasal spray [place] 1 Spray in nose 2 times a day. 3 Bottle 4   • montelukast (SINGULAIR) 10 MG Tab Take 1 Tab by mouth every day. 90 Tab 4   • rosuvastatin (CRESTOR) 10 MG Tab Take 1 Tab by mouth every evening. 90 Tab 4   • tadalafil (CIALIS) 5 MG tablet Take 1 Tab by mouth every day. 90 Tab 4   • mometasone (NASONEX) 50 MCG/ACT nasal spray Spray 4 Sprays in nose every day. 3 Inhaler 4   • levothyroxine (SYNTHROID) 75 MCG Tab TAKE ONE-HALF (1/2) TABLET DAILY 45 Tab 3   • aspirin EC (ECOTRIN) 81 MG TBEC Take 1 Tab by mouth every day.     • NON SPECIFIED Take 1 Each by mouth 2 Times a Day. Omega 3 Fish Oil (735 EPA/ 165 DHA)       No current Epic-ordered facility-administered medications on file.

## 2017-08-23 ENCOUNTER — OFFICE VISIT (OUTPATIENT)
Dept: OTHER | Facility: MEDICAL CENTER | Age: 67
End: 2017-08-23
Payer: MEDICARE

## 2017-08-23 VITALS
TEMPERATURE: 99.1 F | OXYGEN SATURATION: 97 % | HEART RATE: 64 BPM | SYSTOLIC BLOOD PRESSURE: 128 MMHG | DIASTOLIC BLOOD PRESSURE: 80 MMHG | RESPIRATION RATE: 14 BRPM

## 2017-08-23 DIAGNOSIS — S80.01XD CONTUSION OF RIGHT KNEE, SUBSEQUENT ENCOUNTER: Primary | ICD-10-CM

## 2017-08-23 PROCEDURE — 99213 OFFICE O/P EST LOW 20 MIN: CPT | Performed by: FAMILY MEDICINE

## 2017-08-23 NOTE — PROGRESS NOTES
SUBJECTIVE:    Chief Complaint   Patient presents with   • Follow-Up     knee pain       Checo Hughes is a 67 y.o. male,   Established Patient     PROBLEM #1-HISTORY OF PRESENT ILLNESS  Existing Problem, but requiring re-evaluation  PATIENT STATEMENT OF PROBLEM - R knee contusion  ONSET - 1+ week  COURSE - he is much improved compared to last week.  Still w/ mild Decreased ROM, but overall 90% back to baseline.   INTENSITY/STATUS/LOCATION/RADIATION - mild/ improved/ R knee  AGGRAVATORS - previous fall  RELIEVERS - time  TREATMENTS/COMPLIANCE/@GOAL? - same.  He stopped Ibuprofen with improvement and some GI distress/ good/ nearing goal    No Known Allergies    Patient Active Problem List    Diagnosis Date Noted   • [employer] History and Physical 06/05/2017   • Thickened cIMT (Vascular age of 79 vs Chron[employer] Age of 67, in 2017) 06/05/2017   • Simple renal cyst, left 06/05/2017   • Cyst of prostate 06/05/2017   • Calculus of gallbladder without cholecystitis without obstruction 06/05/2017   • Biliary sludge 06/05/2017   • Abnormal chest x-ray 2o trauma 06/05/2017   • Asymptomatic PVCs 06/05/2017   • Allergic rhinitis 05/01/2017   • Dupuytren's contracture of left hand 05/01/2017   • Well adult exam 05/01/2017   • Overweight (BMI 25.0-29.9) 05/01/2017   • 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/2017   • Benign non-nodular prostatic hyperplasia with lower urinary tract symptoms 03/16/2017   • Non-seasonal allergic rhinitis due to pollen 03/16/2017   • Dyslipidemia 03/16/2017   • S/P TURP (status post transurethral resection of prostate) 01/29/2016   • BPH (benign prostatic hypertrophy) 01/29/2016   • Other specified hypothyroidism 01/29/2016   • Vitamin D deficiency 01/29/2016   • 1St degree AV block 07/14/2015   • Acute urinary retention 06/12/2015   • UTI (urinary tract infection) 06/12/2015   • Left-sided low back pain with sciatica 02/02/2015   • Carotid atherosclerosis (Very mild smooth plaque in the right ICA,  per U/S, in 2015) 12/08/2014   • Hypothyroidism 12/08/2014   • Inflamed seborrheic keratosis 12/08/2014   • Mixed hyperlipidemia with apolipoprotein E3 variant 11/24/2014   • Diverticulosis 10/08/2013   • Hyperlipidemia    • Eustachian tube dysfunction    • Sinus complaint 11/07/2012   • Finger joint stiff 11/07/2012   • BPH with obstruction/lower urinary tract symptoms 11/07/2012   • Allergic rhinitis due to pollen 11/07/2012   • Other malaise and fatigue 11/07/2012   • Encounter for therapeutic drug monitoring 11/07/2012   • Special screening for malignant neoplasm of prostate 11/07/2012       Outpatient Encounter Prescriptions as of 8/23/2017   Medication Sig Dispense Refill   • azelastine (ASTELIN) 137 MCG/SPRAY nasal spray [place] 1 Spray in nose 2 times a day. 3 Bottle 4   • montelukast (SINGULAIR) 10 MG Tab Take 1 Tab by mouth every day. 90 Tab 4   • rosuvastatin (CRESTOR) 10 MG Tab Take 1 Tab by mouth every evening. 90 Tab 4   • tadalafil (CIALIS) 5 MG tablet Take 1 Tab by mouth every day. 90 Tab 4   • mometasone (NASONEX) 50 MCG/ACT nasal spray Spray 4 Sprays in nose every day. 3 Inhaler 4   • levothyroxine (SYNTHROID) 75 MCG Tab TAKE ONE-HALF (1/2) TABLET DAILY 45 Tab 3   • aspirin EC (ECOTRIN) 81 MG TBEC Take 1 Tab by mouth every day.     • NON SPECIFIED Take 1 Each by mouth 2 Times a Day. Omega 3 Fish Oil (735 EPA/ 165 DHA)       No facility-administered encounter medications on file as of 8/23/2017.       Social History   Substance Use Topics   • Smoking status: Never Smoker    • Smokeless tobacco: Never Used   • Alcohol Use: None       Family History   Problem Relation Age of Onset   • Heart Disease Father        Patient's Past, Social, and Family History reviewed and updated by me in EPIC today.    REVIEW OF SYMPTOMS:               Pertinent Positives as above.    All other systems reviewed and negative.     OBJECTIVE:    /80 mmHg  Pulse 64  Temp(Src) 37.3 °C (99.1 °F)  Resp 14  SpO2  97%  There is no weight on file to calculate BMI.    Well developed, well nourished male, no acute distress, non-ill appearing. Comfortable, appears stated age, pleasant and cooperative  HEAD: atraumatic, normocephalic   EYES: Conjunctiva normal, extra-occular movements intact, PERRLA, acuity grossly intact.   EARS/NOSE/THROAT: TM's normal, no signs or symptoms of infection, no perforation, no hemotympanum, acuity grossly intact. Oropharynx: benign, no lesions noted. Nares: benign.   NECK: supple, no adenopathy, no thyromegaly or nodules, no jugular vein distention, no carotid bruits.   CHEST/LUNGS: clear to auscultation and percussion bilaterally. No adventitious breath sounds.   CARDIOVASCULAR: regular rate and rhythm, no murmur. Point of maximum intensity not displaced. Good central and peripheral pulses.   BACK: no CVA tenderness.   ABDOMEN: soft, non-tender, non-distended, no masses, no hepatosplenomegaly. Normal active bowel tones.   : deferred.   Rectal: deferred.   Extremities: warm/well-perfused, no cyanosis, clubbing, or edema. R knee: mildly swollen c/t left, mild decreased full flexion, otherwise WNL   SKIN: clear, unbroken, no rashes, normal turgor.   Neuro: Mental Status: Alert and Oriented x 3. CN II-XII grossly intact. Gait normal. Non-focal, intact. Normal strength, sensation    ASSESSMENT:    1. Contusion of right knee, subsequent encounter         PLAN:    Total Face-to-Face time spent with patient: 20 minutes  Amount of time spent counseling patient and/or coordinating care: 15 minutes    The nature of patient counseling as below:  -Patient Education, including below topics:  -Differential Diagnoses and treatment options discussed  -Risks, benefits, alternatives discussed  -Rest/ close observation/ OTC medications  -Therapeutic Lifestyle Changes discussed    The nature of coordination of care as below:  -Continue (other) present chronic medications  -Seek medical attention immediately if  worse    FOLLOW-UP:  For Health Care Maintenance Exams  And as needed.

## 2017-08-24 NOTE — PATIENT INSTRUCTIONS
Current Outpatient Prescriptions Ordered in [facility]   Medication Sig Dispense Refill   • azelastine (ASTELIN) 137 MCG/SPRAY nasal spray [place] 1 Spray in nose 2 times a day. 3 Bottle 4   • montelukast (SINGULAIR) 10 MG Tab Take 1 Tab by mouth every day. 90 Tab 4   • rosuvastatin (CRESTOR) 10 MG Tab Take 1 Tab by mouth every evening. 90 Tab 4   • tadalafil (CIALIS) 5 MG tablet Take 1 Tab by mouth every day. 90 Tab 4   • mometasone (NASONEX) 50 MCG/ACT nasal spray Spray 4 Sprays in nose every day. 3 Inhaler 4   • levothyroxine (SYNTHROID) 75 MCG Tab TAKE ONE-HALF (1/2) TABLET DAILY 45 Tab 3   • aspirin EC (ECOTRIN) 81 MG TBEC Take 1 Tab by mouth every day.     • NON SPECIFIED Take 1 Each by mouth 2 Times a Day. Omega 3 Fish Oil (735 EPA/ 165 DHA)       No current Epic-ordered facility-administered medications on file.

## 2017-09-16 ENCOUNTER — NON-PROVIDER VISIT (OUTPATIENT)
Dept: INTERNAL MEDICINE | Facility: IMAGING CENTER | Age: 67
End: 2017-09-16
Payer: MEDICARE

## 2017-09-16 DIAGNOSIS — Z23 NEED FOR INFLUENZA VACCINATION: ICD-10-CM

## 2017-09-16 PROCEDURE — 90662 IIV NO PRSV INCREASED AG IM: CPT | Performed by: FAMILY MEDICINE

## 2017-09-16 PROCEDURE — G0008 ADMIN INFLUENZA VIRUS VAC: HCPCS | Performed by: FAMILY MEDICINE

## 2017-11-01 ENCOUNTER — HOSPITAL ENCOUNTER (OUTPATIENT)
Facility: MEDICAL CENTER | Age: 67
End: 2017-11-01
Attending: FAMILY MEDICINE
Payer: COMMERCIAL

## 2017-11-01 ENCOUNTER — HOSPITAL ENCOUNTER (OUTPATIENT)
Facility: MEDICAL CENTER | Age: 67
End: 2017-11-01
Attending: FAMILY MEDICINE
Payer: MEDICARE

## 2017-11-01 ENCOUNTER — NON-PROVIDER VISIT (OUTPATIENT)
Dept: OTHER | Facility: MEDICAL CENTER | Age: 67
End: 2017-11-01
Payer: MEDICARE

## 2017-11-01 DIAGNOSIS — E78.5 DYSLIPIDEMIA: ICD-10-CM

## 2017-11-01 DIAGNOSIS — Z00.00 WELL ADULT EXAM: ICD-10-CM

## 2017-11-01 LAB — PSA SERPL-MCNC: 3.1 NG/ML (ref 0–4)

## 2017-11-01 PROCEDURE — 84153 ASSAY OF PSA TOTAL: CPT

## 2017-11-01 RX ORDER — ROSUVASTATIN CALCIUM 10 MG/1
10 TABLET, COATED ORAL EVERY EVENING
Qty: 90 TAB | Refills: 4
Start: 2017-11-01 | End: 2018-07-26

## 2017-11-14 DIAGNOSIS — T83.511A URINARY TRACT INFECTION ASSOCIATED WITH CATHETERIZATION OF URINARY TRACT, UNSPECIFIED INDWELLING URINARY CATHETER TYPE, INITIAL ENCOUNTER (HCC): Primary | ICD-10-CM

## 2017-11-14 DIAGNOSIS — N39.0 URINARY TRACT INFECTION ASSOCIATED WITH CATHETERIZATION OF URINARY TRACT, UNSPECIFIED INDWELLING URINARY CATHETER TYPE, INITIAL ENCOUNTER (HCC): Primary | ICD-10-CM

## 2017-11-14 RX ORDER — SULFAMETHOXAZOLE AND TRIMETHOPRIM 800; 160 MG/1; MG/1
1 TABLET ORAL 2 TIMES DAILY
Qty: 20 TAB | Refills: 0 | Status: SHIPPED | OUTPATIENT
Start: 2017-11-14 | End: 2018-05-29

## 2017-12-28 ENCOUNTER — OFFICE VISIT (OUTPATIENT)
Dept: OTHER | Facility: MEDICAL CENTER | Age: 67
End: 2017-12-28
Payer: MEDICARE

## 2017-12-28 VITALS
TEMPERATURE: 98.8 F | BODY MASS INDEX: 28.47 KG/M2 | HEIGHT: 75 IN | RESPIRATION RATE: 12 BRPM | HEART RATE: 66 BPM | DIASTOLIC BLOOD PRESSURE: 84 MMHG | SYSTOLIC BLOOD PRESSURE: 124 MMHG | WEIGHT: 229 LBS | OXYGEN SATURATION: 97 %

## 2017-12-28 DIAGNOSIS — I65.29 CAROTID ATHEROSCLEROSIS, UNSPECIFIED LATERALITY: ICD-10-CM

## 2017-12-28 DIAGNOSIS — M25.50 ARTHRALGIA, UNSPECIFIED JOINT: ICD-10-CM

## 2017-12-28 DIAGNOSIS — E78.2 MIXED HYPERLIPIDEMIA WITH APOLIPOPROTEIN E3 VARIANT: ICD-10-CM

## 2017-12-28 DIAGNOSIS — M79.10 MYALGIA DUE TO STATIN: Primary | ICD-10-CM

## 2017-12-28 DIAGNOSIS — T46.6X5A MYALGIA DUE TO STATIN: Primary | ICD-10-CM

## 2017-12-28 DIAGNOSIS — R93.89 ABNORMAL CAROTID ULTRASOUND: ICD-10-CM

## 2017-12-28 DIAGNOSIS — E66.3 OVERWEIGHT (BMI 25.0-29.9): ICD-10-CM

## 2017-12-28 PROCEDURE — 99214 OFFICE O/P EST MOD 30 MIN: CPT | Performed by: FAMILY MEDICINE

## 2017-12-28 NOTE — PROGRESS NOTES
SUBJECTIVE:    Chief Complaint   Patient presents with   • Generalized Body Aches   • Hyperlipidemia       Checo Hughes is a 67 y.o. male,   Established Patient     PROBLEM #1-HISTORY OF PRESENT ILLNESS  New Problem  PATIENT STATEMENT OF PROBLEM - myalgias/arthralgias  ONSET - months  COURSE - he had similar issues that resolved a few years ago after discontinuation of Simvastatin.  He has been on Rosuvastatin for about 2 years, and in the past months has developed arthralgias and myalgias.  Reducing the dose from 10 mg to 5 mg did not seem to make much difference.  CVD risk factors, among others, are dyslipidemia, carotid atherosclerosis, and thickened cIMT, and overweight status. Counseled.   INTENSITY/STATUS/LOCATION/RADIATION - variable/ as above  AGGRAVATORS - statin? Other?  RELIEVERS - none  TREATMENTS/COMPLIANCE/@GOAL? - as above/ yes/ no     No Known Allergies    Patient Active Problem List    Diagnosis Date Noted   • [employer] History and Physical 06/05/2017   • Thickened cIMT (Vascular age of 79 vs Chron[employer] Age of 67, in 2017) 06/05/2017   • Simple renal cyst, left 06/05/2017   • Cyst of prostate 06/05/2017   • Calculus of gallbladder without cholecystitis without obstruction 06/05/2017   • Biliary sludge 06/05/2017   • Abnormal chest x-ray 2o trauma 06/05/2017   • Asymptomatic PVCs 06/05/2017   • Allergic rhinitis 05/01/2017   • Dupuytren's contracture of left hand 05/01/2017   • Well adult exam 05/01/2017   • Overweight (BMI 25.0-29.9) 05/01/2017   • 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/2017   • Benign non-nodular prostatic hyperplasia with lower urinary tract symptoms 03/16/2017   • Non-seasonal allergic rhinitis due to pollen 03/16/2017   • Dyslipidemia 03/16/2017   • S/P TURP (status post transurethral resection of prostate) 01/29/2016   • BPH (benign prostatic hypertrophy) 01/29/2016   • Other specified hypothyroidism 01/29/2016   • Vitamin D deficiency 01/29/2016   • 1st degree AV block  07/14/2015   • Acute urinary retention 06/12/2015   • UTI (urinary tract infection) 06/12/2015   • Left-sided low back pain with sciatica 02/02/2015   • Carotid atherosclerosis (Very mild smooth plaque in the right ICA, per U/S, in 2015) 12/08/2014   • Hypothyroidism 12/08/2014   • Inflamed seborrheic keratosis 12/08/2014   • Mixed hyperlipidemia with apolipoprotein E3 variant 11/24/2014   • Diverticulosis 10/08/2013   • Hyperlipidemia    • Eustachian tube dysfunction    • Sinus complaint 11/07/2012   • Finger joint stiff 11/07/2012   • BPH with obstruction/lower urinary tract symptoms 11/07/2012   • Allergic rhinitis due to pollen 11/07/2012   • Other malaise and fatigue 11/07/2012   • Encounter for therapeutic drug monitoring 11/07/2012   • Special screening for malignant neoplasm of prostate 11/07/2012       Outpatient Encounter Prescriptions as of 12/28/2017   Medication Sig Dispense Refill   • levothyroxine (SYNTHROID) 75 MCG Tab TAKE ONE-HALF (1/2) TABLET DAILY 45 Tab 4   • sulfamethoxazole-trimethoprim (BACTRIM DS) 800-160 MG tablet Take 1 Tab by mouth 2 times a day. 20 Tab 0   • rosuvastatin (CRESTOR) 10 MG Tab Take 1 Tab by mouth every evening. 90 Tab 4   • azelastine (ASTELIN) 137 MCG/SPRAY nasal spray [place] 1 Spray in nose 2 times a day. 3 Bottle 4   • montelukast (SINGULAIR) 10 MG Tab Take 1 Tab by mouth every day. 90 Tab 4   • tadalafil (CIALIS) 5 MG tablet Take 1 Tab by mouth every day. 90 Tab 4   • mometasone (NASONEX) 50 MCG/ACT nasal spray Spray 4 Sprays in nose every day. 3 Inhaler 4   • aspirin EC (ECOTRIN) 81 MG TBEC Take 1 Tab by mouth every day.     • NON SPECIFIED Take 1 Each by mouth 2 Times a Day. Omega 3 Fish Oil (735 EPA/ 165 DHA)       No facility-administered encounter medications on file as of 12/28/2017.        Social History   Substance Use Topics   • Smoking status: Never Smoker   • Smokeless tobacco: Never Used   • Alcohol use Not on file       Family History   Problem Relation Age  "of Onset   • Heart Disease Father        Patient's Past, Social, and Family History reviewed and updated by me in EPIC today.    REVIEW OF SYMPTOMS:               Pertinent Positives as above.    All other systems reviewed and negative.     OBJECTIVE:    /84   Pulse 66   Temp 37.1 °C (98.8 °F)   Resp 12   Ht 1.892 m (6' 2.5\")   Wt 103.9 kg (229 lb)   SpO2 97%   BMI 29.01 kg/m²   Body mass index is 29.01 kg/m².    Well developed, well nourished male, no acute distress, non-ill appearing. Comfortable, appears stated age, pleasant and cooperative  HEAD: atraumatic, normocephalic   EYES: Conjunctiva normal, extra-occular movements intact, PERRLA, acuity grossly intact.   EARS/NOSE/THROAT: TM's normal, no signs or symptoms of infection, no perforation, no hemotympanum, acuity grossly intact. Oropharynx: benign, no lesions noted. Nares: benign.   NECK: supple, no adenopathy, no thyromegaly or nodules, no jugular vein distention, no carotid bruits.   CHEST/LUNGS: clear to auscultation and percussion bilaterally. No adventitious breath sounds.   CARDIOVASCULAR: regular rate and rhythm, no murmur. Point of maximum intensity not displaced. Good central and peripheral pulses.   BACK: no CVA tenderness.   ABDOMEN: soft, non-tender, non-distended, no masses, no hepatosplenomegaly. Normal active bowel tones.   : deferred.   Rectal: deferred.   Extremities: warm/well-perfused, no cyanosis, clubbing, or edema.   SKIN: clear, unbroken, no rashes, normal turgor.   Neuro: Mental Status: Alert and Oriented x 3. CN II-XII grossly intact. Gait normal. Non-focal, intact. Normal strength, sensation    ASSESSMENT:    1. Myalgia due to statin     2. Arthralgia, unspecified joint     3. Mixed hyperlipidemia with apolipoprotein E3 variant     4. Carotid atherosclerosis, unspecified laterality     5. Overweight (BMI 25.0-29.9)     6. Thickened cIMT (Vascular age of 79 vs Chron[employer] Age of 67, in 2017)         PLAN:    Total " Face-to-Face time spent with patient: 30 minutes  Amount of time spent counseling patient and/or coordinating care: 20 minutes    The nature of patient counseling as below:  -Patient Education, including below topics:  -Differential Diagnoses and treatment options discussed  -Risks, benefits, alternatives discussed  -Labs & Imaging reviewed with patient in detail  -Therapeutic Lifestyle Changes discussed    The nature of coordination of care as below:  -Medications added: none  -Medications discontinued: Rosuvastatin  -Medications adjusted: none  -Continue (other) present chronic medications  -Labs: in 4 months (Initial A/psa/test/CK...UA/UACR/Urine F2-iso)  -Referrals: none  -Other: none  -Seek medical attention immediately if worse    FOLLOW-UP:  -in 5 months (after labs)  -and sooner if test/consult results warrant  And for Health Care Maintenance Exams  And as needed.

## 2018-04-17 DIAGNOSIS — Z00.00 WELL ADULT EXAM: ICD-10-CM

## 2018-04-23 ENCOUNTER — HOSPITAL ENCOUNTER (OUTPATIENT)
Dept: RADIOLOGY | Facility: MEDICAL CENTER | Age: 68
End: 2018-04-23
Attending: FAMILY MEDICINE
Payer: MEDICARE

## 2018-04-23 DIAGNOSIS — Z00.00 PHYSICAL EXAM, ROUTINE: ICD-10-CM

## 2018-04-23 PROCEDURE — 77080 DXA BONE DENSITY AXIAL: CPT

## 2018-05-01 ENCOUNTER — NON-PROVIDER VISIT (OUTPATIENT)
Dept: OTHER | Facility: MEDICAL CENTER | Age: 68
End: 2018-05-01

## 2018-05-01 ENCOUNTER — HOSPITAL ENCOUNTER (OUTPATIENT)
Facility: MEDICAL CENTER | Age: 68
End: 2018-05-01
Attending: FAMILY MEDICINE
Payer: COMMERCIAL

## 2018-05-01 ENCOUNTER — HOSPITAL ENCOUNTER (OUTPATIENT)
Dept: RADIOLOGY | Facility: MEDICAL CENTER | Age: 68
End: 2018-05-01
Attending: FAMILY MEDICINE
Payer: COMMERCIAL

## 2018-05-01 DIAGNOSIS — Z00.00 PHYSICAL EXAM, ROUTINE: ICD-10-CM

## 2018-05-01 DIAGNOSIS — Z00.00 WELL ADULT EXAM: ICD-10-CM

## 2018-05-01 PROCEDURE — 71046 X-RAY EXAM CHEST 2 VIEWS: CPT

## 2018-05-01 PROCEDURE — 76700 US EXAM ABDOM COMPLETE: CPT

## 2018-05-01 PROCEDURE — 306734 HCHG ADVANCED VASCULAR SCREENING

## 2018-05-02 LAB
APPEARANCE UR: CLEAR
BACTERIA #/AREA URNS HPF: NEGATIVE /HPF
BILIRUB UR QL STRIP.AUTO: NEGATIVE
COLOR UR: YELLOW
CREAT UR-MCNC: 134.7 MG/DL
EPI CELLS #/AREA URNS HPF: NEGATIVE /HPF
GLUCOSE UR STRIP.AUTO-MCNC: NEGATIVE MG/DL
HYALINE CASTS #/AREA URNS LPF: ABNORMAL /LPF
KETONES UR STRIP.AUTO-MCNC: NEGATIVE MG/DL
LEUKOCYTE ESTERASE UR QL STRIP.AUTO: ABNORMAL
MICRO URNS: ABNORMAL
MICROALBUMIN UR-MCNC: <0.7 MG/DL
MICROALBUMIN/CREAT UR: NORMAL MG/G (ref 0–30)
NITRITE UR QL STRIP.AUTO: NEGATIVE
PH UR STRIP.AUTO: 6 [PH]
PROT UR QL STRIP: NEGATIVE MG/DL
RBC # URNS HPF: ABNORMAL /HPF
RBC UR QL AUTO: NEGATIVE
SP GR UR STRIP.AUTO: 1.02
UROBILINOGEN UR STRIP.AUTO-MCNC: 0.2 MG/DL
WBC #/AREA URNS HPF: ABNORMAL /HPF

## 2018-05-29 ENCOUNTER — NON-PROVIDER VISIT (OUTPATIENT)
Dept: OTHER | Facility: MEDICAL CENTER | Age: 68
End: 2018-05-29

## 2018-05-29 ENCOUNTER — OFFICE VISIT (OUTPATIENT)
Dept: OTHER | Facility: MEDICAL CENTER | Age: 68
End: 2018-05-29

## 2018-05-29 VITALS
HEART RATE: 72 BPM | TEMPERATURE: 99.2 F | SYSTOLIC BLOOD PRESSURE: 120 MMHG | BODY MASS INDEX: 27.03 KG/M2 | RESPIRATION RATE: 14 BRPM | HEIGHT: 75 IN | OXYGEN SATURATION: 96 % | WEIGHT: 217.4 LBS | DIASTOLIC BLOOD PRESSURE: 64 MMHG

## 2018-05-29 DIAGNOSIS — Z00.00 ANNUAL PHYSICAL EXAM: Primary | ICD-10-CM

## 2018-05-29 DIAGNOSIS — N13.8 BPH WITH OBSTRUCTION/LOWER URINARY TRACT SYMPTOMS: ICD-10-CM

## 2018-05-29 DIAGNOSIS — I49.3 ASYMPTOMATIC PVCS: ICD-10-CM

## 2018-05-29 DIAGNOSIS — Z23 NEED FOR SHINGLES VACCINE: ICD-10-CM

## 2018-05-29 DIAGNOSIS — N31.2 HYPOTONIC BLADDER: ICD-10-CM

## 2018-05-29 DIAGNOSIS — R74.8 ELEVATED CREATINE KINASE LEVEL: ICD-10-CM

## 2018-05-29 DIAGNOSIS — I44.0 1ST DEGREE AV BLOCK: ICD-10-CM

## 2018-05-29 DIAGNOSIS — R73.09 ELEVATED HEMOGLOBIN A1C: ICD-10-CM

## 2018-05-29 DIAGNOSIS — R93.41 ABNORMAL ULTRASOUND OF BLADDER: ICD-10-CM

## 2018-05-29 DIAGNOSIS — R94.31 ABNORMAL ECG DURING EXERCISE STRESS TEST: ICD-10-CM

## 2018-05-29 DIAGNOSIS — R93.89 ABNORMAL CAROTID ULTRASOUND: ICD-10-CM

## 2018-05-29 DIAGNOSIS — K80.20 CALCULUS OF GALLBLADDER WITHOUT CHOLECYSTITIS WITHOUT OBSTRUCTION: ICD-10-CM

## 2018-05-29 DIAGNOSIS — E78.2 MIXED HYPERLIPIDEMIA WITH APOLIPOPROTEIN E3 VARIANT: ICD-10-CM

## 2018-05-29 DIAGNOSIS — N40.1 BPH WITH OBSTRUCTION/LOWER URINARY TRACT SYMPTOMS: ICD-10-CM

## 2018-05-29 DIAGNOSIS — N28.1 SIMPLE RENAL CYST: ICD-10-CM

## 2018-05-29 DIAGNOSIS — E53.8 LOW SERUM VITAMIN B12: ICD-10-CM

## 2018-05-29 DIAGNOSIS — D64.9 MILD ANEMIA: ICD-10-CM

## 2018-05-29 DIAGNOSIS — E03.9 HYPOTHYROIDISM, UNSPECIFIED TYPE: ICD-10-CM

## 2018-05-29 DIAGNOSIS — E88.819 INSULIN RESISTANCE: ICD-10-CM

## 2018-05-29 RX ORDER — EZETIMIBE 10 MG/1
10 TABLET ORAL DAILY
Qty: 90 TAB | Refills: 4 | Status: SHIPPED | OUTPATIENT
Start: 2018-05-29 | End: 2019-08-07 | Stop reason: SDUPTHER

## 2018-05-29 RX ORDER — LEVOTHYROXINE SODIUM 0.05 MG/1
50 TABLET ORAL
Qty: 90 TAB | Refills: 4 | Status: SHIPPED | OUTPATIENT
Start: 2018-05-29 | End: 2019-08-05 | Stop reason: SDUPTHER

## 2018-05-29 ASSESSMENT — ACTIVITIES OF DAILY LIVING (ADL): BATHING_REQUIRES_ASSISTANCE: 0

## 2018-05-29 ASSESSMENT — ENCOUNTER SYMPTOMS: GENERAL WELL-BEING: EXCELLENT

## 2018-05-29 ASSESSMENT — PATIENT HEALTH QUESTIONNAIRE - PHQ9: CLINICAL INTERPRETATION OF PHQ2 SCORE: 0

## 2018-05-29 NOTE — LETTER
"May 29, 2018        Checo Hughes  [address] [place] [address]        Dear Checo:    Thank you for participating in [facility]. We covered a great deal of information, and I have summarized my Assessment and Plan below.  Please carefully review all the information in this packet.  I want you to come back in for labs in 4 months, and then reappoint with me in 4.5 months.    Overall, I consider you to be in good health.  I am particularly pleased with your exercise routine and the absence of any liliya george atherosclerotic plaque seen on imaging.  In terms of atherosclerotic cardiovascular disease risk issues, you do have the following: abnormal exercise stress test, dyslipidemia, insulin resistance, mildly overweight with increased waist circumference, increased Carotid Intima-Media Thickness/Vascular Age, gallstones, and asymptomatic PVC's.  As discussed, I would like you to undergo a Stress ECHOCARDIOGRAM.  In addition, the best treatment for most of these conditions is Therapeutic Lifestyle Changes.  Specifically, I strongly recommend eating \"real food, mostly plants, not too much\", daily exercise, striving for a normal weight/BMI, active stress management, 7-8 hours of quality sleep per night, a loving social environment, and an altruistic philosophy.  I would like to initiate just Ezetimibe for your sterol hyperabsorption to see if we can bring you lipids/lipoproteins under better control without a statin medication.  Also, I increased you levothyroxine to 50 mcg/day.  I do recommend you obtain the 2-shot Shingrix vaccine series at your pharmacy soon.  If you have any questions or concerns, please don't hesitate to call.        Sincerely,        Trav Cooley M.D.              ASSESSMENT:    Encounter Diagnoses   Name Primary?   • [employer] Executive History and Physical Yes   • Abnormal ECG during exercise stress test    • Mixed hyperlipidemia with apolipoprotein E3 variant    • " "Hypothyroidism, unspecified type    • BMI 26.0-26.9,adult, and Waist Circumference: 41\"    • BPH with obstruction/lower urinary tract symptoms    • 1st degree AV block    • Hypotonic bladder    • Thickened cIMT (Vascular age of >80 vs Chronological Age of 67, in 2018)    • Simple renal cyst, left    • Calculus of gallbladder without cholecystitis without obstruction    • Asymptomatic PVC's    • Insulin resistance    • Need for shingles vaccine    • Elevated creatine kinase level    • Mild anemia    • Low serum vitamin B12    • Elevated hemoglobin A1c    • Abnormal ultrasound of bladder        PLAN:    Total Face-to-Face time spent with patient: 90 minutes  Amount of time spent counseling patient and/or coordinating care: 50 minutes    The nature of patient counseling as below:  -Patient Education  -Differential Diagnoses and treatment options discussed  -Risks, benefits, alternatives discussed  -Labs reviewed with patient in detail  -Imaging/ETT/PFT/vision/hearing reports reviewed with patient in detail  -Health Maintenance Exam issues discussed  -Exercise  -Dietary recommendations discussed  -Weight Loss strategies discussed  -Therapeutic Lifestyle Changes discussed    The nature of coordination of care as below:  -Medications added: Ezetimibe 10 mg/day  -Medications discontinued: none  -Medications adjusted: Levothyroxine increased from 37.5 to 50 mcg/day  -Continue present chronic medications  -Referrals: none  -Other: Stress ECHOCARDIOGRAM   Begin Shingrix series @ pharmacy   Recheck labs in 4 months ([facility] orders written)   Patient will contemplate Hereditary Cancer Screening  -Seek medical attention immediately if worse    FOLLOW-UP:  -With Primary Care Provider in 4.5 months     "

## 2018-05-30 NOTE — PROGRESS NOTES
"[facility]    EXECUTIVE HISTORY AND PHYSICAL  Performed by Dr. Trav Cooley    SUBJECTIVE:    Chief Complaint   Patient presents with   • Executive Physical   • Annual Wellness Visit   • Other     Overweight and increased waist circumference   • Abnormal EKG     Abnormal Stress ECG   • Cholelithiasis   • Benign Prostatic Hypertrophy     with urinary retention & abnormal bladder U/S   • Hyperlipidemia   • Abnormal Labs   • Other     Increased cIMT   • Other     left kidney cyst   • Immunizations     Due for Shingrix series       Checo Hughes is a 68 y.o. male,   Established Patient @ [facility]    Preventive medicine issues discussed:  abuse, aspirin, dental, Alcohol, Tobacco, HIV/AIDS, injuries, mental health/depression, nutrition, exercise, occupational health, sexual behavior, UV exposure, Cancer Screening. Vaccines.      PROBLEM #1-HISTORY OF PRESENT ILLNESS  PATIENT STATEMENT OF PROBLEM - ASCVD related issues  ONSET - discussed today  COURSE - CTCS \"0\" in 2017. History of mild carotid atherosclerosis, although none seen via imaging today.  Again with increased cIMT (I.E. >80 vs 68 years).  Abnormal Exercise Stress Test with asymptomatic  ST depression and bigeminy.  He is no longer on statin or ezetimibe. OPO reassuring.  Asymptomatic Cholecystitis. Current pertinent labs:   HIGH & INT. RISK: Tchol/LDL/HDL/Non-HDL/ApoB/LDL-P/sdLDL/VLDL/ApoA1/TC:TRIG/VLDL:TRIG/ApoB:ApoA1/HDL:TRIG/TRIG:HDL(3.6)/HDL-map/Sterol Hyperabsorption/A1c(5.8)/LpPLA2/NT-proBNP/FA-imbalance/CK(314)/low B12/mildly low H&H.    Counseled.   INTENSITY/STATUS/LOCATION/RADIATION - variable/ asymptomatic, present/ as above  AGGRAVATORS - Multifactorial   RELIEVERS - mostly good Therapeutic Lifestyle Changes although too much saturated fat in diet, mildly overweight, increased waist circ.   TREATMENTS/COMPLIANCE/@GOAL? - Therapeutic Lifestyle Changes/ as above/ no     PROBLEM #2-HISTORY OF PRESENT " ILLNESS  New Problem  PATIENT STATEMENT OF PROBLEM - incidental left kidney cyst seen on U/S    PROBLEM #3-HISTORY OF PRESENT ILLNESS  Existing Problem  PATIENT STATEMENT OF PROBLEM - BPH with urinary retention  ONSET - years  COURSE - he sees Urologist, Dr. Capellan in [place].  He had not been self-catheterizing for past 5 months until about a week ago.  He had UTI in 12/2017.  Now he has been just catheterizing at HS with about 500 ml residual, and then sleeping well throughout the night.   INTENSITY/STATUS/LOCATION/RADIATION - as above/ present  AGGRAVATORS - BPH  RELIEVERS - as above  TREATMENTS/COMPLIANCE/@GOAL? - same/ good/ likely at realistic goal    PROBLEM #4-HISTORY OF PRESENT ILLNESS  New Problem  PATIENT STATEMENT OF PROBLEM - Due for Shingrix vaccine  ONSET - counseled today.     Diagnosing METABOLIC SYNDROME  -?-Must have 3 or more of the following 5 Risk Factors(Patient meets criteria # 1,3)     RISK FACTOR    DEFINING LEVEL  1-Abdominal Obesity        Waist [email or web address]   Men (North Americans)  >102 cm (>40 inches)   Women (North Americans)  >88 cm (>35 inches)  (see literature for Ethnic Group waist circumference differences)  2-Triglycerides ?150 mg/dL (or on treatment for this lipid disorder)  3-HDL Cholesterol    Men  <40 mg/dL (or on treatment for this lipid disorder)   Women <50 mg/dL (or on treatment for this lipid disorder)  4-Blood Pressure  ?130 systolic or ?84 diastolic (or on HTN treatment)  5-Fasting Glucose ?100 mg/dL(or previously diagnosed DM or Ins. Resistance)  (FYI: If FBS ?100 mg/dL, then patient also has Insulin Resistance)    Synonyms  Hypertension-hyperglycemia-hyperuricemia syndrome   Syndrome X   Dysmetabolic syndrome X   Insulin resistance syndrome   Metabolic dyslipidemia   The deadly quartet (upper-body obesity, glucose intolerance, hypertriglyceridemia, and hypertension)   Civilization syndrome  Reaven Syndrome    Diagnosing INSULIN RESISTANCE:  Any 1 of following (Patient meets criteria # 2)  1. Presence of METABOLIC SYNDROME  2. TRIGLYCERIDE/HDL RATIO:  - >3.5 = IR in Caucasians  - ?3.0 = IR in /Citizen of [place] Americans  - ?2.0 = IR in Non- Blacks  3. Fasting Blood Sugar ? 100 mg/dL         (If FBS > 126, then DM2)  4. Oral Glucose Tolerance Test   - One hour glucose: ? 125 mg/dL   - (If > 150, significantly increased risk of developing DM2)  - Two hour glucose: ? 120 mg/dL  - (120-139=only 33% B-cell fx. 140-199=only 15% B-cell fx)   - (200 or above=DM2 and only 10% B-cell fx.)  - PRE-DIABETES, a type of Insulin Resistance:  o Two hour glucose of 140 to 199  5. A1c ? 6.5%                     (or ? 5.7 % AND the following 2 Dental Parameters:    1- ? 26% of Gum Pockets are ?5mm depth    2- ? 4 Missing Teeth)      No Known Allergies    Patient Active Problem List    Diagnosis Date Noted   • Abnormal ECG during exercise stress test 05/29/2018   • Insulin resistance 05/29/2018   • Need for shingles vaccine 05/29/2018   • Thickened cIMT (Vascular age of >80 vs Chronologica Age of 67, in 2018) 05/29/2018   • Elevated creatine kinase level 05/29/2018   • Mild anemia 05/29/2018   • Low serum vitamin B12 05/29/2018   • Abnormal ultrasound of bladder 05/29/2018   • Elevated hemoglobin A1c 05/29/2018   • Platinum Executive History and Physical 06/05/2017   • Simple renal cyst, left 06/05/2017   • Cyst of prostate 06/05/2017   • Calculus of gallbladder without cholecystitis without obstruction 06/05/2017   • Biliary sludge 06/05/2017   • Abnormal chest x-ray 2o trauma 06/05/2017   • Asymptomatic PVCs 06/05/2017   • Allergic rhinitis 05/01/2017   • Dupuytren's contracture of left hand 05/01/2017   • Well adult exam 05/01/2017   • BMI 26.0-26.9,adult 05/01/2017   • 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/2017   • Benign non-nodular prostatic hyperplasia with lower urinary tract symptoms 03/16/2017   • Non-seasonal allergic rhinitis due to pollen 03/16/2017   • Dyslipidemia  03/16/2017   • S/P TURP (status post transurethral resection of prostate) 01/29/2016   • BPH (benign prostatic hypertrophy) 01/29/2016   • Other specified hypothyroidism 01/29/2016   • Vitamin D deficiency 01/29/2016   • 1st degree AV block 07/14/2015   • Acute urinary retention 06/12/2015   • UTI (urinary tract infection) 06/12/2015   • Left-sided low back pain with sciatica 02/02/2015   • Carotid atherosclerosis (Very mild smooth plaque in the right ICA, per U/S, in 2015) 12/08/2014   • Hypothyroidism 12/08/2014   • Inflamed seborrheic keratosis 12/08/2014   • Mixed hyperlipidemia with apolipoprotein E3 variant 11/24/2014   • Diverticulosis 10/08/2013   • Hyperlipidemia    • Eustachian tube dysfunction    • Sinus complaint 11/07/2012   • Finger joint stiff 11/07/2012   • BPH with obstruction/lower urinary tract symptoms 11/07/2012   • Allergic rhinitis due to pollen 11/07/2012   • Other malaise and fatigue 11/07/2012   • Encounter for therapeutic drug monitoring 11/07/2012   • Special screening for malignant neoplasm of prostate 11/07/2012       Current Outpatient Prescriptions on File Prior to Visit   Medication Sig Dispense Refill   • tadalafil (CIALIS) 5 MG tablet Take 1 Tab by mouth every day. 90 Tab 4   • aspirin EC (ECOTRIN) 81 MG TBEC Take 1 Tab by mouth every day.     • NON SPECIFIED Take 1 Each by mouth 2 Times a Day. Omega 3 Fish Oil (735 EPA/ 165 DHA)     • rosuvastatin (CRESTOR) 10 MG Tab Take 1 Tab by mouth every evening. 90 Tab 4   • azelastine (ASTELIN) 137 MCG/SPRAY nasal spray [place] 1 Spray in nose 2 times a day. 3 Bottle 4   • mometasone (NASONEX) 50 MCG/ACT nasal spray Spray 4 Sprays in nose every day. 3 Inhaler 4     No current facility-administered medications on file prior to visit.        Social History     Social History   • Marital status: Single     Spouse name: N/A   • Number of children: N/A   • Years of education: N/A     Occupational History   • Not on file.     Social History Main  "Topics   • Smoking status: Never Smoker   • Smokeless tobacco: Never Used   • Alcohol use Not on file   • Drug use: No   • Sexual activity: Yes     Partners: Female     Other Topics Concern   • Not on file     Social History Narrative   • No narrative on file       Family History   Problem Relation Age of Onset   • Heart Disease Father        Patient's Past, Social, and Family History reviewed     REVIEW OF SYMPTOMS:               Pertinent Positives as above.    All other systems reviewed and negative.     OBJECTIVE:    /64   Pulse 72   Temp 37.3 °C (99.2 °F)   Resp 14   Ht 1.905 m (6' 3\")   Wt 98.6 kg (217 lb 6.4 oz)   SpO2 96%   BMI 27.17 kg/m²   Body mass index is 27.17 kg/m².    Well developed, well nourished male, no acute distress, non-ill appearing. Comfortable, appears stated age, pleasant and cooperative, Alert and Oriented x 3.   HEAD: atraumatic, normocephalic   EYES: Conjunctiva normal, EOMI, PERRLA, acuity grossly intact.   EARS/NOSE/THROAT: TM's normal, no SSX of infection, no perforation, no hemotympanum, acuity grossly intact. Oropharynx: benign, no lesions noted. Nares: benign.   NECK: supple, no adenopathy, no thyromegaly or nodules, no JVD, no carotid bruits.   CHEST/LUNGS: clear to auscultation and percussion bilaterally. No adventitious breath sounds.   CARDIOVASCULAR: regular rate and rhythm, no murmur. PMI not displaced. Good central and peripheral pulses.   BACK: no CVA tenderness.   ABDOMEN: soft, non-tender, non-distended, no masses, no hepatosplenomegaly. Normal active bowel tones.   : deferred.   Rectal: prostate enlarged, is symmetric, no obvious nodules or palpable rectal abnormalities.   Extremities: warm/well-perfused, no cyanosis, clubbing, or edema.   SKIN: unbroken, no rashes, normal turgor. Sun damage, some SK lesions. No new lesions per patient.   Neuro: Mental Status: Alert and Oriented x 3. CN II-XII grossly intact. Gait normal. Non-focal, intact. Normal " "strength, sensation    EXERCISE STRESS TEST REPORT:    Interpreted by me    INTERPRETATION:  Patient achieved 88% of maximum predicted heart rate with physiological response in blood pressure. Bigeminy in early stages of exercise.  ST depression in V4-V6 at max exercise and into recovery phase.  Also, specifically no associated ST elevation, no ventricular tachycardia, no new atrial fibrillation or supraventricular tachycardia, and  no new heart blocks.  Patient denied chest pain, severe dyspnea, dizziness, or ataxia.     CONCLUSION:  Abnormal Exercise Stress Test with asymptomatic ST depression in V4-V6, and bigeminy.    ASSESSMENT:    Encounter Diagnoses   Name Primary?   • Platinum Executive History and Physical Yes   • Abnormal ECG during exercise stress test    • Mixed hyperlipidemia with apolipoprotein E3 variant    • Hypothyroidism, unspecified type    • BMI 26.0-26.9,adult, and Waist Circumference: 41\"    • BPH with obstruction/lower urinary tract symptoms    • 1st degree AV block    • Hypotonic bladder    • Thickened cIMT (Vascular age of >80 vs Chronologica Age of 67, in 2018)    • Simple renal cyst, left    • Calculus of gallbladder without cholecystitis without obstruction    • Asymptomatic PVCs    • Insulin resistance    • Need for shingles vaccine    • Elevated creatine kinase level    • Mild anemia    • Low serum vitamin B12    • Elevated hemoglobin A1c    • Abnormal ultrasound of bladder        PLAN:    Total Face-to-Face time spent with patient: 90 minutes  Amount of time spent counseling patient and/or coordinating care: 50 minutes    The nature of patient counseling as below:  -Patient Education  -Differential Diagnoses and treatment options discussed  -Risks, benefits, alternatives discussed  -Labs reviewed with patient in detail  -Imaging/ETT/PFT/vision/hearing reports reviewed with patient in detail  -Health Maintenance Exam issues discussed  -Exercise  -Dietary recommendations " discussed  -Weight Loss strategies discussed  -Therapeutic Lifestyle Changes discussed    The nature of coordination of care as below:  -Medications added: Ezetimibe 10 mg/day  -Medications discontinued: none  -Medications adjusted: Levothyroxine increased from 37.5 to 50 mcg/day  -Continue present chronic medications  -Referrals: none  -Other: Stress ECHOCARDIOGRAM   Begin Shingrix series @ pharmacy   Recheck labs in 4 months ([facility] orders written)   Patient will contemplate Hereditary Cancer Screening  -Seek medical attention immediately if worse    FOLLOW-UP:  -With Primary Care Provider in 4.5 months

## 2018-05-30 NOTE — PATIENT INSTRUCTIONS
Current Outpatient Prescriptions Ordered in [facility]   Medication Sig Dispense Refill   • ezetimibe (ZETIA) 10 MG Tab Take 1 Tab by mouth every day. 90 Tab 4   • levothyroxine (SYNTHROID) 50 MCG Tab Take 1 Tab by mouth Every morning on an empty stomach. 90 Tab 4   • tadalafil (CIALIS) 5 MG tablet Take 1 Tab by mouth every day. 90 Tab 4   • aspirin EC (ECOTRIN) 81 MG TBEC Take 1 Tab by mouth every day.     • NON SPECIFIED Take 1 Each by mouth 2 Times a Day. Omega 3 Fish Oil (735 EPA/ 165 DHA)     • rosuvastatin (CRESTOR) 10 MG Tab Take 1 Tab by mouth every evening. 90 Tab 4   • azelastine (ASTELIN) 137 MCG/SPRAY nasal spray [place] 1 Spray in nose 2 times a day. 3 Bottle 4   • mometasone (NASONEX) 50 MCG/ACT nasal spray Spray 4 Sprays in nose every day. 3 Inhaler 4     No current Epic-ordered facility-administered medications on file.

## 2018-06-01 DIAGNOSIS — N39.0 URINARY TRACT INFECTION ASSOCIATED WITH CATHETERIZATION OF URINARY TRACT, UNSPECIFIED INDWELLING URINARY CATHETER TYPE, INITIAL ENCOUNTER (HCC): Primary | ICD-10-CM

## 2018-06-01 DIAGNOSIS — T83.511A URINARY TRACT INFECTION ASSOCIATED WITH CATHETERIZATION OF URINARY TRACT, UNSPECIFIED INDWELLING URINARY CATHETER TYPE, INITIAL ENCOUNTER (HCC): Primary | ICD-10-CM

## 2018-06-01 RX ORDER — SULFAMETHOXAZOLE AND TRIMETHOPRIM 800; 160 MG/1; MG/1
1 TABLET ORAL 2 TIMES DAILY
Qty: 20 TAB | Refills: 0 | Status: SHIPPED | OUTPATIENT
Start: 2018-06-01 | End: 2018-07-26

## 2018-06-26 ENCOUNTER — HOSPITAL ENCOUNTER (OUTPATIENT)
Dept: CARDIOLOGY | Facility: MEDICAL CENTER | Age: 68
End: 2018-06-26
Attending: FAMILY MEDICINE
Payer: MEDICARE

## 2018-06-26 DIAGNOSIS — R94.31 ABNORMAL ECG DURING EXERCISE STRESS TEST: ICD-10-CM

## 2018-06-26 LAB — LV EJECT FRACT  99904: 50

## 2018-06-26 PROCEDURE — 93350 STRESS TTE ONLY: CPT | Mod: 26 | Performed by: INTERNAL MEDICINE

## 2018-06-26 PROCEDURE — 93017 CV STRESS TEST TRACING ONLY: CPT

## 2018-06-26 PROCEDURE — 93350 STRESS TTE ONLY: CPT

## 2018-06-26 PROCEDURE — 93018 CV STRESS TEST I&R ONLY: CPT | Performed by: INTERNAL MEDICINE

## 2018-06-29 ENCOUNTER — OFFICE VISIT (OUTPATIENT)
Dept: OTHER | Facility: MEDICAL CENTER | Age: 68
End: 2018-06-29
Payer: MEDICARE

## 2018-06-29 VITALS
HEART RATE: 63 BPM | HEIGHT: 75 IN | DIASTOLIC BLOOD PRESSURE: 80 MMHG | SYSTOLIC BLOOD PRESSURE: 120 MMHG | WEIGHT: 221 LBS | RESPIRATION RATE: 12 BRPM | TEMPERATURE: 98.3 F | BODY MASS INDEX: 27.48 KG/M2 | OXYGEN SATURATION: 97 %

## 2018-06-29 DIAGNOSIS — I49.3 VENTRICULAR ECTOPY: ICD-10-CM

## 2018-06-29 DIAGNOSIS — I65.21 ATHEROSCLEROSIS OF RIGHT CAROTID ARTERY: ICD-10-CM

## 2018-06-29 DIAGNOSIS — R94.39 ABNORMAL STRESS ELECTROCARDIOGRAM TEST USING TREADMILL: ICD-10-CM

## 2018-06-29 DIAGNOSIS — I49.3 ASYMPTOMATIC PVCS: ICD-10-CM

## 2018-06-29 DIAGNOSIS — I73.9 PAD (PERIPHERAL ARTERY DISEASE) (HCC): Primary | ICD-10-CM

## 2018-06-29 DIAGNOSIS — R94.39 ABNORMAL STRESS ECHOCARDIOGRAM: ICD-10-CM

## 2018-06-29 DIAGNOSIS — I44.0 1ST DEGREE AV BLOCK: ICD-10-CM

## 2018-06-29 DIAGNOSIS — Z78.9 STATIN INTOLERANCE: ICD-10-CM

## 2018-06-29 DIAGNOSIS — E78.5 DYSLIPIDEMIA: ICD-10-CM

## 2018-06-29 DIAGNOSIS — I25.9 CARDIAC ISCHEMIA: ICD-10-CM

## 2018-06-29 DIAGNOSIS — E78.2 MIXED HYPERLIPIDEMIA WITH APOLIPOPROTEIN E3 VARIANT: ICD-10-CM

## 2018-06-29 DIAGNOSIS — Z82.49 FAMILY HISTORY OF ISCHEMIC HEART DISEASE (IHD): ICD-10-CM

## 2018-06-29 DIAGNOSIS — I47.29 NON-SUSTAINED VENTRICULAR TACHYCARDIA (HCC): ICD-10-CM

## 2018-06-29 DIAGNOSIS — R93.1 DECREASED CARDIAC EJECTION FRACTION: ICD-10-CM

## 2018-06-29 DIAGNOSIS — R93.89 ABNORMAL CAROTID ULTRASOUND: ICD-10-CM

## 2018-06-29 PROCEDURE — 99214 OFFICE O/P EST MOD 30 MIN: CPT | Performed by: FAMILY MEDICINE

## 2018-06-29 RX ORDER — METOPROLOL SUCCINATE 25 MG/1
12.5 TABLET, EXTENDED RELEASE ORAL
Qty: 45 TAB | Refills: 4 | Status: SHIPPED
Start: 2018-06-29 | End: 2018-08-21

## 2018-06-29 NOTE — PROGRESS NOTES
SUBJECTIVE:    Chief Complaint   Patient presents with   • Coronary Artery Disease     Stress ECHO ischemia    • Peripheral Vascular Disease (PVD)     Peripheral Artery Disease   • Hyperlipidemia     Severe myalgias with both Simvastatin and Rosuvastatin       Checo Hughes is a 68 y.o. male,   Established Patient     PROBLEM #1-HISTORY OF PRESENT ILLNESS  Existing Problem, but requiring re-evaluation  PATIENT STATEMENT OF PROBLEM - Cardiac Ischemia  ONSET - months  COURSE - He has known Peripheral Artery Disease with Carotid Artery Atherosclerosis and very thickened cIMT (Vascular age > 80 years despite Chronological Age of on 68 years).  He is severely statin intolerant, having severe intolerable myalgias with both Simvastatin and more recently Rosuvastatin.  He had an Exercise Stress Test which was diagnostic for Ischemia, and then subsequent Stress ECHOCARDIOGRAM which was diagnostic for Ischemia per LAD distribution wall abnormalities AND per ECG criteria again.  Also, low ejection fraction and post exercise ectopy and non-sustained Ventricular Tachycardia.  Case discussed with local Cardiologist today who recommended immediate PCSK-9 inhibitor therapy and Toprol XL 12.5 mg/evenings, and to have Cardiology consultation soon.   INTENSITY/STATUS/LOCATION/RADIATION - severe/high risk/LAD, peripheral arteries  AGGRAVATORS - Multifactorial, statin intolerance, family history of Ischemic Heart Disease  RELIEVERS - good Therapeutic Lifestyle Changes   TREATMENTS/COMPLIANCE/@GOAL? - as above/ good/ no     No Known Allergies    Patient Active Problem List    Diagnosis Date Noted   • Decreased cardiac ejection fraction at 50% 06/29/2018   • Abnormal stress electrocardiogram test using treadmill 06/29/2018   • Atherosclerosis of right carotid artery 06/29/2018   • Ventricular ectopy 06/29/2018   • Non-sustained ventricular tachycardia (HCC) 06/29/2018   • Statin intolerance 06/29/2018   • Cardiac ischemia  06/29/2018   • PAD (peripheral artery disease) (HCC) 06/29/2018   • Abnormal stress echocardiogram 06/29/2018   • Family history of ischemic heart disease (IHD) 06/29/2018   • Abnormal ECG during exercise stress test 05/29/2018   • Insulin resistance 05/29/2018   • Need for shingles vaccine 05/29/2018   • Thickened cIMT (Vascular age of >80 vs Chronological Age of 67, in 2018) 05/29/2018   • Elevated creatine kinase level 05/29/2018   • Mild anemia 05/29/2018   • Low serum vitamin B12 05/29/2018   • Abnormal ultrasound of bladder 05/29/2018   • Elevated hemoglobin A1c 05/29/2018   • [employer] History and Physical 06/05/2017   • Simple renal cyst, left 06/05/2017   • Cyst of prostate 06/05/2017   • Calculus of gallbladder without cholecystitis without obstruction 06/05/2017   • Biliary sludge 06/05/2017   • Abnormal chest x-ray 2o trauma 06/05/2017   • Asymptomatic PVCs 06/05/2017   • Allergic rhinitis 05/01/2017   • Dupuytren's contracture of left hand 05/01/2017   • Well adult exam 05/01/2017   • BMI 26.0-26.9,adult 05/01/2017   • 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/2017   • Benign non-nodular prostatic hyperplasia with lower urinary tract symptoms 03/16/2017   • Non-seasonal allergic rhinitis due to pollen 03/16/2017   • Dyslipidemia 03/16/2017   • S/P TURP (status post transurethral resection of prostate) 01/29/2016   • BPH (benign prostatic hypertrophy) 01/29/2016   • Other specified hypothyroidism 01/29/2016   • Vitamin D deficiency 01/29/2016   • 1st degree AV block 07/14/2015   • Acute urinary retention 06/12/2015   • UTI (urinary tract infection) 06/12/2015   • Left-sided low back pain with sciatica 02/02/2015   • Carotid atherosclerosis (Very mild smooth plaque in the right ICA, per U/S, in 2015) 12/08/2014   • Hypothyroidism 12/08/2014   • Inflamed seborrheic keratosis 12/08/2014   • Mixed hyperlipidemia with apolipoprotein E3 variant 11/24/2014   • Diverticulosis 10/08/2013   • Hyperlipidemia    • Eustachian  tube dysfunction    • Sinus complaint 11/07/2012   • Finger joint stiff 11/07/2012   • BPH with obstruction/lower urinary tract symptoms 11/07/2012   • Allergic rhinitis due to pollen 11/07/2012   • Other malaise and fatigue 11/07/2012   • Encounter for therapeutic drug monitoring 11/07/2012   • Special screening for malignant neoplasm of prostate 11/07/2012       Outpatient Encounter Prescriptions as of 6/29/2018   Medication Sig Dispense Refill   • metoprolol SR (TOPROL XL) 25 MG TABLET SR 24 HR Take 0.5 Tabs by mouth every bedtime. 45 Tab 4   • Alirocumab (PRALUENT) 150 MG/ML Solution Pen-injector Inject 150 mg as instructed every 14 days. 6 PEN 4   • sulfamethoxazole-trimethoprim (BACTRIM DS) 800-160 MG tablet Take 1 Tab by mouth 2 times a day. 20 Tab 0   • ezetimibe (ZETIA) 10 MG Tab Take 1 Tab by mouth every day. 90 Tab 4   • levothyroxine (SYNTHROID) 50 MCG Tab Take 1 Tab by mouth Every morning on an empty stomach. 90 Tab 4   • rosuvastatin (CRESTOR) 10 MG Tab Take 1 Tab by mouth every evening. 90 Tab 4   • azelastine (ASTELIN) 137 MCG/SPRAY nasal spray [place] 1 Spray in nose 2 times a day. 3 Bottle 4   • tadalafil (CIALIS) 5 MG tablet Take 1 Tab by mouth every day. 90 Tab 4   • mometasone (NASONEX) 50 MCG/ACT nasal spray Spray 4 Sprays in nose every day. 3 Inhaler 4   • aspirin EC (ECOTRIN) 81 MG TBEC Take 1 Tab by mouth every day.     • NON SPECIFIED Take 1 Each by mouth 2 Times a Day. Omega 3 Fish Oil (735 EPA/ 165 DHA)       No facility-administered encounter medications on file as of 6/29/2018.        Social History   Substance Use Topics   • Smoking status: Never Smoker   • Smokeless tobacco: Never Used   • Alcohol use No       Family History   Problem Relation Age of Onset   • Heart Disease Father        Patient's Past, Social, and Family History reviewed and updated by me in EPIC today.    REVIEW OF SYMPTOMS:               Pertinent Positives as above.    All other systems reviewed and negative.  "    OBJECTIVE:    /80   Pulse 63   Temp 36.8 °C (98.3 °F)   Resp 12   Ht 1.905 m (6' 3\")   Wt 100.2 kg (221 lb)   SpO2 97%   BMI 27.62 kg/m²   Body mass index is 27.62 kg/m².    Well developed, well nourished male, no acute distress, non-ill appearing. Comfortable, appears stated age, pleasant and cooperative  HEAD: atraumatic, normocephalic   EYES: Conjunctiva normal, extra-occular movements intact, PERRLA, acuity grossly intact.   EARS/NOSE/THROAT: TM's normal, no signs or symptoms of infection, no perforation, no hemotympanum, acuity grossly intact. Oropharynx: benign, no lesions noted. Nares: benign.   NECK: supple, no adenopathy, no thyromegaly or nodules, no jugular vein distention, no carotid bruits.   CHEST/LUNGS: clear to auscultation and percussion bilaterally. No adventitious breath sounds.   CARDIOVASCULAR: regular rate and rhythm, no murmur. Point of maximum intensity not displaced. Good central and peripheral pulses.   BACK: no CVA tenderness.   ABDOMEN: soft, non-tender, non-distended, no masses, no hepatosplenomegaly. Normal active bowel tones.   : deferred.   Rectal: deferred.   Extremities: warm/well-perfused, no cyanosis, clubbing, or edema.   SKIN: clear, unbroken, no rashes, normal turgor.   Neuro: Mental Status: Alert and Oriented x 3. CN II-XII grossly intact. Gait normal. Non-focal, intact. Normal strength, sensation    ASSESSMENT:    1. PAD (peripheral artery disease) (HCC)  Alirocumab (PRALUENT) 150 MG/ML Solution Pen-injector   2. Cardiac ischemia  Alirocumab (PRALUENT) 150 MG/ML Solution Pen-injector   3. Statin intolerance  Alirocumab (PRALUENT) 150 MG/ML Solution Pen-injector   4. Non-sustained ventricular tachycardia (HCC)     5. Ventricular ectopy     6. Mixed hyperlipidemia with apolipoprotein E3 variant  REFERRAL TO CARDIOLOGY    Alirocumab (PRALUENT) 150 MG/ML Solution Pen-injector   7. Atherosclerosis of right carotid artery  Alirocumab (PRALUENT) 150 MG/ML Solution " Pen-injector   8. Dyslipidemia  REFERRAL TO CARDIOLOGY    Alirocumab (PRALUENT) 150 MG/ML Solution Pen-injector   9. Thickened cIMT (Vascular age of >80 vs Chronological Age of 67, in 2018)  REFERRAL TO CARDIOLOGY    Alirocumab (PRALUENT) 150 MG/ML Solution Pen-injector   10. Abnormal stress echocardiogram     11. Abnormal stress electrocardiogram test using treadmill  REFERRAL TO CARDIOLOGY    metoprolol SR (TOPROL XL) 25 MG TABLET SR 24 HR   12. Decreased cardiac ejection fraction at 50%  REFERRAL TO CARDIOLOGY    metoprolol SR (TOPROL XL) 25 MG TABLET SR 24 HR   13. 1st degree AV block  REFERRAL TO CARDIOLOGY   14. Asymptomatic PVCs  REFERRAL TO CARDIOLOGY    metoprolol SR (TOPROL XL) 25 MG TABLET SR 24 HR   15. Family history of ischemic heart disease (IHD)         PLAN:    Total Face-to-Face time spent with patient: 45 minutes  Amount of time spent counseling patient and/or coordinating care: 30 minutes    The nature of patient counseling as below:  -Patient Education, including below topics:  -Differential Diagnoses and treatment options discussed  -Risks, benefits, alternatives discussed  -Labs & Imaging reviewed with patient in detail  -Exercise  -Dietary recommendations discussed  -Weight Loss strategies discussed  -Therapeutic Lifestyle Changes discussed    The nature of coordination of care as below:  -Medications added: Toprol XL 12.5 mg every evening   Praluent 150 mg SQ every 14 days   -Medications discontinued: none  -Medications adjusted: none  -Continue (other) present chronic medications  -Blood Pressure Diary  -Referrals: Cardiology Referral with Dr. Richard Schneider initiated  -Seek medical attention immediately if worse    FOLLOW-UP:  -in 2 weeks  -and sooner if test/consult results warrant  And for Health Care Maintenance Exams  And as needed.

## 2018-06-29 NOTE — PATIENT INSTRUCTIONS
Current Outpatient Prescriptions Ordered in [facility]   Medication Sig Dispense Refill   • metoprolol SR (TOPROL XL) 25 MG TABLET SR 24 HR Take 0.5 Tabs by mouth every bedtime. 45 Tab 4   • Alirocumab (PRALUENT) 150 MG/ML Solution Pen-injector Inject 150 mg as instructed every 14 days. 6 PEN 4   • sulfamethoxazole-trimethoprim (BACTRIM DS) 800-160 MG tablet Take 1 Tab by mouth 2 times a day. 20 Tab 0   • ezetimibe (ZETIA) 10 MG Tab Take 1 Tab by mouth every day. 90 Tab 4   • levothyroxine (SYNTHROID) 50 MCG Tab Take 1 Tab by mouth Every morning on an empty stomach. 90 Tab 4   • rosuvastatin (CRESTOR) 10 MG Tab Take 1 Tab by mouth every evening. 90 Tab 4   • azelastine (ASTELIN) 137 MCG/SPRAY nasal spray [place] 1 Spray in nose 2 times a day. 3 Bottle 4   • tadalafil (CIALIS) 5 MG tablet Take 1 Tab by mouth every day. 90 Tab 4   • mometasone (NASONEX) 50 MCG/ACT nasal spray Spray 4 Sprays in nose every day. 3 Inhaler 4   • aspirin EC (ECOTRIN) 81 MG TBEC Take 1 Tab by mouth every day.     • NON SPECIFIED Take 1 Each by mouth 2 Times a Day. Omega 3 Fish Oil (735 EPA/ 165 DHA)       No current Epic-ordered facility-administered medications on file.

## 2018-07-02 ENCOUNTER — TELEPHONE (OUTPATIENT)
Dept: CARDIOLOGY | Facility: MEDICAL CENTER | Age: 68
End: 2018-07-02

## 2018-07-02 NOTE — TELEPHONE ENCOUNTER
----- Message from Nicholas Craig, Med Ass't sent at 6/29/2018 12:26 PM PDT -----  Regarding: Call from Dr Lenora Olivarez,    Per Dr Cooley he just spoke to IA and was advised to get the pt in. He also said that he wants him in soon and to speak the the nurse to see what dates he can be squeezed in with IA. Pt is only available 7/13, 7/16, 7/17 and after or on 7/24/18. Let me know.     Thanks,  Nicholas [phone]     ================================================================    Discussed w/ Dr Schneider, per Dr Schneider, ok to schedule pt anytime this week or overbook.     S/w Nicholas , per Nicholas she attempted to call pt, no answer, lvm to call back

## 2018-07-12 ENCOUNTER — OFFICE VISIT (OUTPATIENT)
Dept: OTHER | Facility: MEDICAL CENTER | Age: 68
End: 2018-07-12
Payer: MEDICARE

## 2018-07-12 VITALS
TEMPERATURE: 98.6 F | OXYGEN SATURATION: 96 % | RESPIRATION RATE: 14 BRPM | HEART RATE: 66 BPM | WEIGHT: 221 LBS | HEIGHT: 73 IN | BODY MASS INDEX: 29.29 KG/M2 | SYSTOLIC BLOOD PRESSURE: 120 MMHG | DIASTOLIC BLOOD PRESSURE: 88 MMHG

## 2018-07-12 DIAGNOSIS — E88.819 INSULIN RESISTANCE: ICD-10-CM

## 2018-07-12 DIAGNOSIS — E78.2 MIXED HYPERLIPIDEMIA: ICD-10-CM

## 2018-07-12 DIAGNOSIS — I25.9 CARDIAC ISCHEMIA: ICD-10-CM

## 2018-07-12 DIAGNOSIS — E78.2 MIXED HYPERLIPIDEMIA WITH APOLIPOPROTEIN E3 VARIANT: ICD-10-CM

## 2018-07-12 DIAGNOSIS — R93.89 ABNORMAL CAROTID ULTRASOUND: ICD-10-CM

## 2018-07-12 DIAGNOSIS — R94.39 ABNORMAL STRESS ECHOCARDIOGRAM: ICD-10-CM

## 2018-07-12 DIAGNOSIS — Z78.9 STATIN INTOLERANCE: ICD-10-CM

## 2018-07-12 DIAGNOSIS — Z82.49 FAMILY HISTORY OF ISCHEMIC HEART DISEASE (IHD): ICD-10-CM

## 2018-07-12 DIAGNOSIS — E78.5 DYSLIPIDEMIA: ICD-10-CM

## 2018-07-12 DIAGNOSIS — R93.1 DECREASED CARDIAC EJECTION FRACTION: ICD-10-CM

## 2018-07-12 DIAGNOSIS — I65.21 ATHEROSCLEROSIS OF RIGHT CAROTID ARTERY: ICD-10-CM

## 2018-07-12 DIAGNOSIS — R60.9 PITTING EDEMA: ICD-10-CM

## 2018-07-12 DIAGNOSIS — I73.9 PAD (PERIPHERAL ARTERY DISEASE) (HCC): Primary | ICD-10-CM

## 2018-07-12 DIAGNOSIS — R94.39 ABNORMAL STRESS ELECTROCARDIOGRAM TEST USING TREADMILL: ICD-10-CM

## 2018-07-12 PROCEDURE — 99214 OFFICE O/P EST MOD 30 MIN: CPT | Performed by: FAMILY MEDICINE

## 2018-07-12 NOTE — PATIENT INSTRUCTIONS
Current Outpatient Prescriptions Ordered in [facility]   Medication Sig Dispense Refill   • Evolocumab 140 MG/ML Solution Prefilled Syringe Inject 140 mg as instructed every 14 days for 6 doses. 6 Syringe 4   • metoprolol SR (TOPROL XL) 25 MG TABLET SR 24 HR Take 0.5 Tabs by mouth every bedtime. 45 Tab 4   • sulfamethoxazole-trimethoprim (BACTRIM DS) 800-160 MG tablet Take 1 Tab by mouth 2 times a day. 20 Tab 0   • ezetimibe (ZETIA) 10 MG Tab Take 1 Tab by mouth every day. 90 Tab 4   • levothyroxine (SYNTHROID) 50 MCG Tab Take 1 Tab by mouth Every morning on an empty stomach. 90 Tab 4   • rosuvastatin (CRESTOR) 10 MG Tab Take 1 Tab by mouth every evening. 90 Tab 4   • azelastine (ASTELIN) 137 MCG/SPRAY nasal spray [place] 1 Spray in nose 2 times a day. 3 Bottle 4   • tadalafil (CIALIS) 5 MG tablet Take 1 Tab by mouth every day. 90 Tab 4   • mometasone (NASONEX) 50 MCG/ACT nasal spray Spray 4 Sprays in nose every day. 3 Inhaler 4   • aspirin EC (ECOTRIN) 81 MG TBEC Take 1 Tab by mouth every day.     • NON SPECIFIED Take 1 Each by mouth 2 Times a Day. Omega 3 Fish Oil (735 EPA/ 165 DHA)       No current Epic-ordered facility-administered medications on file.

## 2018-07-26 ENCOUNTER — OFFICE VISIT (OUTPATIENT)
Dept: CARDIOLOGY | Facility: MEDICAL CENTER | Age: 68
End: 2018-07-26
Payer: MEDICARE

## 2018-07-26 ENCOUNTER — TELEPHONE (OUTPATIENT)
Dept: CARDIOLOGY | Facility: MEDICAL CENTER | Age: 68
End: 2018-07-26

## 2018-07-26 VITALS
HEART RATE: 70 BPM | SYSTOLIC BLOOD PRESSURE: 130 MMHG | HEIGHT: 76 IN | DIASTOLIC BLOOD PRESSURE: 80 MMHG | WEIGHT: 221 LBS | BODY MASS INDEX: 26.91 KG/M2

## 2018-07-26 DIAGNOSIS — I65.21 ATHEROSCLEROSIS OF RIGHT CAROTID ARTERY: ICD-10-CM

## 2018-07-26 DIAGNOSIS — I49.3 PVC (PREMATURE VENTRICULAR CONTRACTION): ICD-10-CM

## 2018-07-26 DIAGNOSIS — Z82.49 FAMILY HISTORY OF HEART FAILURE: ICD-10-CM

## 2018-07-26 DIAGNOSIS — R94.31 ABNORMAL ECG DURING EXERCISE STRESS TEST: Primary | ICD-10-CM

## 2018-07-26 PROCEDURE — 99204 OFFICE O/P NEW MOD 45 MIN: CPT | Performed by: INTERNAL MEDICINE

## 2018-07-26 NOTE — LETTER
Name:          Checo Hughes   YOB: 1950  Date:     07/26/2018      Trav Cooley M.D.  [address]  [place] [address]     Richard Schneider MD  [address]  [place] [address]  Phone: [phone]  Back Line: [phone]  Fax: [phone]  E-mail: [email or web address]   Dear Dr. Cooley,    We had the pleasure of seeing your patient, Checo Hughes, in Cardiology Clinic at [facility] today.    As you know, he is a 68-year-old man with a family history of heart failure, mild carotid atherosclerosis, and no coronary calcifications by CT coronary calcium scan 5/2017.  He does have frequent PVCs in the recovery phase of an exercise treadmill test that he had 5/2019, and a resting left ventricular ejection fraction read as low normal on his stress echocardiogram of 50%.    I reviewed with him today carefully his workup and frequent PVCs during treadmill of his exercise EKG.  I reviewed with him the natural history of asymptomatic PVCs noting that there is some evidence of a decrement in left ventricular ejection fraction with 20,000 or more PVCs per day.  I ordered a Holter monitor to define the frequency of his PVCs.    I reviewed with him also his overall risk of heart attack or stroke in my opinion.  I noted that the American College of cardiology is atherosclerotic cardiovascular disease risk calculator includes diagnosis of stroke or myocardial infarction.  I discussed that in the context of his dyslipidemia is on previous lipid panels modified by the fact that he has no coronary calcification whatsoever and some increased carotid medial thickness.  On the basis of all that, I do not think that he will derive significant benefit from a PCS canine inhibitor.  I did recommend that he continue on Zetia in addition to diet and exercise with a target LDL less than 100 mg/dL.    Return in about 1 year (around 7/26/2019).    Thank you for the  referral and please do not hesitate to contact me at any time. My contact information is listed above.    This note was dictated using Dragon speech recognition software.     A full note including my physical examination and a full list of rectified medications is available in our medical record, and can be faxed as well.    Richard Schneider MD  Cardiologist  [facility]

## 2018-07-27 PROBLEM — Z82.49 FAMILY HISTORY OF HEART FAILURE: Status: ACTIVE | Noted: 2018-07-27

## 2018-07-27 PROBLEM — I25.9 CARDIAC ISCHEMIA: Status: RESOLVED | Noted: 2018-06-29 | Resolved: 2018-07-27

## 2018-07-27 ASSESSMENT — ENCOUNTER SYMPTOMS: CARDIOVASCULAR NEGATIVE: 1
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
## 2018-08-09 ENCOUNTER — NON-PROVIDER VISIT (OUTPATIENT)
Dept: CARDIOLOGY | Facility: MEDICAL CENTER | Age: 68
End: 2018-08-09
Payer: MEDICARE

## 2018-08-09 DIAGNOSIS — I49.3 PVC (PREMATURE VENTRICULAR CONTRACTION): ICD-10-CM

## 2018-08-09 DIAGNOSIS — R94.31 ABNORMAL ECG DURING EXERCISE STRESS TEST: ICD-10-CM

## 2018-08-10 ENCOUNTER — TELEPHONE (OUTPATIENT)
Dept: CARDIOLOGY | Facility: MEDICAL CENTER | Age: 68
End: 2018-08-10

## 2018-08-10 DIAGNOSIS — I49.3 PVC'S (PREMATURE VENTRICULAR CONTRACTIONS): ICD-10-CM

## 2018-08-10 NOTE — TELEPHONE ENCOUNTER
need holter order, IA ordered 24 hour holter, dx: CARMENs   Sabi Almanza, Med Ass't  MARIA TERESA QuezadaN.     ===============================================    Holter Monitor ordered

## 2018-08-14 ENCOUNTER — TELEPHONE (OUTPATIENT)
Dept: CARDIOLOGY | Facility: MEDICAL CENTER | Age: 68
End: 2018-08-14

## 2018-08-14 LAB — EKG IMPRESSION: NORMAL

## 2018-08-14 PROCEDURE — 93224 XTRNL ECG REC UP TO 48 HRS: CPT | Performed by: INTERNAL MEDICINE

## 2018-08-14 NOTE — TELEPHONE ENCOUNTER
Holter Monitor per Dr Schneider:    Summary: Frequent PVCs (Prmature Ventricular Contractions, or extrasystoles),   not dangerous     Electronically Signed On 8- 10:27:19 PDT by Richard Schneider MD       Called pt and notified, pt verbalizes understanding.

## 2018-09-11 ENCOUNTER — NON-PROVIDER VISIT (OUTPATIENT)
Dept: OTHER | Facility: MEDICAL CENTER | Age: 68
End: 2018-09-11
Payer: MEDICARE

## 2018-09-11 DIAGNOSIS — Z00.00 WELL ADULT EXAM: ICD-10-CM

## 2018-09-11 LAB
HBA1C MFR BLD: 5.5 % (ref ?–5.8)
INT CON NEG: NEGATIVE
INT CON POS: POSITIVE

## 2018-09-11 PROCEDURE — 83036 HEMOGLOBIN GLYCOSYLATED A1C: CPT | Mod: GZ | Performed by: FAMILY MEDICINE

## 2018-11-02 DIAGNOSIS — T83.511A URINARY TRACT INFECTION ASSOCIATED WITH CATHETERIZATION OF URINARY TRACT, UNSPECIFIED INDWELLING URINARY CATHETER TYPE, INITIAL ENCOUNTER (HCC): ICD-10-CM

## 2018-11-02 DIAGNOSIS — N39.0 URINARY TRACT INFECTION ASSOCIATED WITH CATHETERIZATION OF URINARY TRACT, UNSPECIFIED INDWELLING URINARY CATHETER TYPE, INITIAL ENCOUNTER (HCC): ICD-10-CM

## 2018-11-02 RX ORDER — SULFAMETHOXAZOLE AND TRIMETHOPRIM 800; 160 MG/1; MG/1
1 TABLET ORAL 2 TIMES DAILY
Qty: 20 TAB | Refills: 0 | Status: SHIPPED | OUTPATIENT
Start: 2018-11-02 | End: 2019-03-28

## 2018-11-14 ENCOUNTER — NON-PROVIDER VISIT (OUTPATIENT)
Dept: OTHER | Facility: MEDICAL CENTER | Age: 68
End: 2018-11-14
Payer: MEDICARE

## 2018-11-29 ENCOUNTER — OFFICE VISIT (OUTPATIENT)
Dept: OTHER | Facility: MEDICAL CENTER | Age: 68
End: 2018-11-29
Payer: MEDICARE

## 2018-11-29 VITALS
WEIGHT: 225.8 LBS | OXYGEN SATURATION: 96 % | TEMPERATURE: 97.9 F | DIASTOLIC BLOOD PRESSURE: 90 MMHG | BODY MASS INDEX: 29.93 KG/M2 | HEIGHT: 73 IN | RESPIRATION RATE: 12 BRPM | SYSTOLIC BLOOD PRESSURE: 130 MMHG | HEART RATE: 61 BPM

## 2018-11-29 DIAGNOSIS — Z23 NEED FOR PNEUMOCOCCAL VACCINATION: ICD-10-CM

## 2018-11-29 DIAGNOSIS — N40.1 BENIGN NON-NODULAR PROSTATIC HYPERPLASIA WITH LOWER URINARY TRACT SYMPTOMS: ICD-10-CM

## 2018-11-29 DIAGNOSIS — E78.2 MIXED HYPERLIPIDEMIA WITH APOLIPOPROTEIN E3 VARIANT: Primary | ICD-10-CM

## 2018-11-29 PROCEDURE — 99214 OFFICE O/P EST MOD 30 MIN: CPT | Mod: 25 | Performed by: FAMILY MEDICINE

## 2018-11-29 PROCEDURE — G0009 ADMIN PNEUMOCOCCAL VACCINE: HCPCS | Performed by: FAMILY MEDICINE

## 2018-11-29 PROCEDURE — 90732 PPSV23 VACC 2 YRS+ SUBQ/IM: CPT | Performed by: FAMILY MEDICINE

## 2018-11-29 RX ORDER — TADALAFIL 5 MG/1
5 TABLET ORAL DAILY
Qty: 90 TAB | Refills: 4 | Status: SHIPPED | OUTPATIENT
Start: 2018-11-29 | End: 2020-10-21 | Stop reason: SDUPTHER

## 2018-11-29 NOTE — PROGRESS NOTES
SUBJECTIVE:    Chief Complaint   Patient presents with   • Hyperlipidemia   • Benign Prostatic Hypertrophy       Checo Hughes is a 68 y.o. male,   Established Patient     PROBLEM #1-HISTORY OF PRESENT ILLNESS  Existing Problem, but requiring re-evaluation  PATIENT STATEMENT OF PROBLEM - Dyslipidemia  ONSET - years  COURSE - Currently on ezetimibe only.  Labs are improved, but still w/ some dyslipidemia. No side effects. Counseled.     PROBLEM #2-HISTORY OF PRESENT ILLNESS  Existing Problem, but requiring re-evaluation  PATIENT STATEMENT OF PROBLEM - BPH  ONSET - years  COURSE - daily Tadalafil helpful. Needs refill.     No Known Allergies    Patient Active Problem List    Diagnosis Date Noted   • Family history of heart failure 07/27/2018   • Trace Pitting edema, right distal lower leg and foot 07/12/2018   • Decreased cardiac ejection fraction at 50% 06/29/2018   • Abnormal stress electrocardiogram test using treadmill 06/29/2018   • Atherosclerosis of right carotid artery 06/29/2018   • Ventricular ectopy 06/29/2018   • Non-sustained ventricular tachycardia (HCC) 06/29/2018   • Statin intolerance 06/29/2018   • PAD (peripheral artery disease) (HCC) 06/29/2018   • Abnormal stress echocardiogram 06/29/2018   • Family history of ischemic heart disease (IHD) 06/29/2018   • Abnormal ECG during exercise stress test 05/29/2018   • Insulin resistance 05/29/2018   • Need for shingles vaccine 05/29/2018   • Thickened cIMT (Vascular age of >80 vs Chronological Age of 67, in 2018) 05/29/2018   • Elevated creatine kinase level 05/29/2018   • Mild anemia 05/29/2018   • Low serum vitamin B12 05/29/2018   • Abnormal ultrasound of bladder 05/29/2018   • Elevated hemoglobin A1c 05/29/2018   • [employer] History and Physical 06/05/2017   • Simple renal cyst, left 06/05/2017   • Cyst of prostate 06/05/2017   • Calculus of gallbladder without cholecystitis without obstruction 06/05/2017   • Biliary sludge 06/05/2017    • Abnormal chest x-ray 2o trauma 06/05/2017   • Asymptomatic PVCs 06/05/2017   • Allergic rhinitis 05/01/2017   • Dupuytren's contracture of left hand 05/01/2017   • Well adult exam 05/01/2017   • BMI 26.0-26.9,adult 05/01/2017   • 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/2017   • Benign non-nodular prostatic hyperplasia with lower urinary tract symptoms 03/16/2017   • Non-seasonal allergic rhinitis due to pollen 03/16/2017   • Dyslipidemia 03/16/2017   • S/P TURP (status post transurethral resection of prostate) 01/29/2016   • BPH (benign prostatic hypertrophy) 01/29/2016   • Other specified hypothyroidism 01/29/2016   • Vitamin D deficiency 01/29/2016   • 1st degree AV block 07/14/2015   • Acute urinary retention 06/12/2015   • UTI (urinary tract infection) 06/12/2015   • Left-sided low back pain with sciatica 02/02/2015   • Carotid atherosclerosis (Very mild smooth plaque in the right ICA, per U/S, in 2015) 12/08/2014   • Hypothyroidism 12/08/2014   • Inflamed seborrheic keratosis 12/08/2014   • Mixed hyperlipidemia with apolipoprotein E3 variant 11/24/2014   • Diverticulosis 10/08/2013   • Hyperlipidemia    • Eustachian tube dysfunction    • Sinus complaint 11/07/2012   • Finger joint stiff 11/07/2012   • BPH with obstruction/lower urinary tract symptoms 11/07/2012   • Allergic rhinitis due to pollen 11/07/2012   • Other malaise and fatigue 11/07/2012   • Encounter for therapeutic drug monitoring 11/07/2012   • Special screening for malignant neoplasm of prostate 11/07/2012       Outpatient Encounter Prescriptions as of 11/29/2018   Medication Sig Dispense Refill   • tadalafil (CIALIS) 5 MG tablet Take 1 Tab by mouth every day. 90 Tab 4   • ezetimibe (ZETIA) 10 MG Tab Take 1 Tab by mouth every day. 90 Tab 4   • levothyroxine (SYNTHROID) 50 MCG Tab Take 1 Tab by mouth Every morning on an empty stomach. 90 Tab 4   • sulfamethoxazole-trimethoprim (BACTRIM DS) 800-160 MG tablet Take 1 Tab by mouth 2 times a day. 20 Tab 0   •  "vitamin D (CHOLECALCIFEROL) 1000 UNIT Tab Take 1,000 Units by mouth every day.     • azelastine (ASTELIN) 137 MCG/SPRAY nasal spray [place] 1 Spray in nose 2 times a day. 3 Bottle 4   • mometasone (NASONEX) 50 MCG/ACT nasal spray Spray 4 Sprays in nose every day. 3 Inhaler 4   • [DISCONTINUED] tadalafil (CIALIS) 5 MG tablet Take 1 Tab by mouth every day. 90 Tab 4   • aspirin EC (ECOTRIN) 81 MG TBEC Take 1 Tab by mouth every day.     • NON SPECIFIED Take 1 Each by mouth 2 Times a Day. Omega 3 Fish Oil (735 EPA/ 165 DHA)       No facility-administered encounter medications on file as of 11/29/2018.        Social History   Substance Use Topics   • Smoking status: Never Smoker   • Smokeless tobacco: Never Used   • Alcohol use 0.0 oz/week      Comment: 1 per day       Family History   Problem Relation Age of Onset   • Heart Disease Father 65        ? valvular heart disease   • Heart Disease Brother 65       Patient's Past, Social, and Family History reviewed and updated by me in EPIC today.    REVIEW OF SYMPTOMS:               Pertinent Positives as above.    All other systems reviewed and negative.     OBJECTIVE:    /90   Pulse 61   Temp 36.6 °C (97.9 °F) (Temporal)   Resp 12   Ht 1.854 m (6' 1\")   Wt 102.4 kg (225 lb 12.8 oz)   SpO2 96%   BMI 29.79 kg/m²   Body mass index is 29.79 kg/m².    Well developed, well nourished male, no acute distress, non-ill appearing. Comfortable, appears stated age, pleasant and cooperative  HEAD: atraumatic, normocephalic   EYES: Conjunctiva normal, extra-occular movements intact, PERRLA, acuity grossly intact.   EARS/NOSE/THROAT: TM's normal, no signs or symptoms of infection, no perforation, no hemotympanum, acuity grossly intact. Oropharynx: benign, no lesions noted. Nares: benign.   NECK: supple, no adenopathy, no thyromegaly or nodules, no jugular vein distention, no carotid bruits.   CHEST/LUNGS: clear to auscultation and percussion bilaterally. No adventitious breath " sounds.   CARDIOVASCULAR: regular rate and rhythm, no murmur. Point of maximum intensity not displaced. Good central and peripheral pulses.   BACK: no CVA tenderness.   ABDOMEN: soft, non-tender, non-distended, no masses, no hepatosplenomegaly. Normal active bowel tones.   : deferred.   Rectal: deferred.   Extremities: warm/well-perfused, no cyanosis, clubbing, or edema.   SKIN: clear, unbroken, no rashes, normal turgor.   Neuro: Mental Status: Alert and Oriented x 3. CN II-XII grossly intact. Gait normal. Non-focal, intact. Normal strength, sensation    ASSESSMENT:    1. Mixed hyperlipidemia with apolipoprotein E3 variant     2. Benign non-nodular prostatic hyperplasia with lower urinary tract symptoms  tadalafil (CIALIS) 5 MG tablet   3. Need for pneumococcal vaccination  PneumoVax PPV23 =>5yo       PLAN:    Total Face-to-Face time spent with patient: 30 minutes  Amount of time spent counseling patient and/or coordinating care: 20 minutes    The nature of patient counseling as below:  -Patient Education, including below topics:  -Differential Diagnoses and treatment options discussed  -Risks, benefits, alternatives discussed  -Labs reviewed with patient in detail  -Health Maintenance Exam issues discussed  -Therapeutic Lifestyle Changes discussed    The nature of coordination of care as below:  -Medications added/refilled: Tadalafil refilled  -Medications discontinued: none  -Medications adjusted: none  -Continue (other) present chronic medications  -Other: Pneumovax 23 administered in clinic today  -Seek medical attention immediately if worse    FOLLOW-UP:  -in 3 months  -and sooner if test/consult results warrant  And for Health Care Maintenance Exams  And as needed.

## 2018-12-11 DIAGNOSIS — D50.0 IRON DEFICIENCY ANEMIA DUE TO CHRONIC BLOOD LOSS: Primary | ICD-10-CM

## 2018-12-11 RX ORDER — LANOLIN ALCOHOL/MO/W.PET/CERES
325 CREAM (GRAM) TOPICAL 2 TIMES DAILY WITH MEALS
Qty: 60 TAB | Refills: 0 | Status: SHIPPED | OUTPATIENT
Start: 2018-12-11 | End: 2021-05-24

## 2018-12-11 NOTE — PATIENT INSTRUCTIONS
Current Outpatient Prescriptions Ordered in [facility]   Medication Sig Dispense Refill   • tadalafil (CIALIS) 5 MG tablet Take 1 Tab by mouth every day. 90 Tab 4   • ezetimibe (ZETIA) 10 MG Tab Take 1 Tab by mouth every day. 90 Tab 4   • levothyroxine (SYNTHROID) 50 MCG Tab Take 1 Tab by mouth Every morning on an empty stomach. 90 Tab 4   • sulfamethoxazole-trimethoprim (BACTRIM DS) 800-160 MG tablet Take 1 Tab by mouth 2 times a day. 20 Tab 0   • vitamin D (CHOLECALCIFEROL) 1000 UNIT Tab Take 1,000 Units by mouth every day.     • azelastine (ASTELIN) 137 MCG/SPRAY nasal spray [place] 1 Spray in nose 2 times a day. 3 Bottle 4   • mometasone (NASONEX) 50 MCG/ACT nasal spray Spray 4 Sprays in nose every day. 3 Inhaler 4   • aspirin EC (ECOTRIN) 81 MG TBEC Take 1 Tab by mouth every day.     • NON SPECIFIED Take 1 Each by mouth 2 Times a Day. Omega 3 Fish Oil (735 EPA/ 165 DHA)       No current Epic-ordered facility-administered medications on file.

## 2019-03-28 ENCOUNTER — OFFICE VISIT (OUTPATIENT)
Dept: OTHER | Facility: MEDICAL CENTER | Age: 69
End: 2019-03-28
Attending: FAMILY MEDICINE
Payer: MEDICARE

## 2019-03-28 VITALS
HEART RATE: 62 BPM | RESPIRATION RATE: 12 BRPM | HEIGHT: 73 IN | WEIGHT: 220.2 LBS | BODY MASS INDEX: 29.18 KG/M2 | SYSTOLIC BLOOD PRESSURE: 132 MMHG | TEMPERATURE: 98.5 F | OXYGEN SATURATION: 98 % | DIASTOLIC BLOOD PRESSURE: 90 MMHG

## 2019-03-28 DIAGNOSIS — N13.8 BPH WITH OBSTRUCTION/LOWER URINARY TRACT SYMPTOMS: ICD-10-CM

## 2019-03-28 DIAGNOSIS — Z90.79 S/P TURP (STATUS POST TRANSURETHRAL RESECTION OF PROSTATE): ICD-10-CM

## 2019-03-28 DIAGNOSIS — E78.2 MIXED HYPERLIPIDEMIA: ICD-10-CM

## 2019-03-28 DIAGNOSIS — M17.12 OSTEOARTHRITIS OF LEFT KNEE, UNSPECIFIED OSTEOARTHRITIS TYPE: Primary | ICD-10-CM

## 2019-03-28 DIAGNOSIS — N31.2 HYPOTONIC BLADDER: ICD-10-CM

## 2019-03-28 DIAGNOSIS — N40.1 BPH WITH OBSTRUCTION/LOWER URINARY TRACT SYMPTOMS: ICD-10-CM

## 2019-03-28 PROCEDURE — 99214 OFFICE O/P EST MOD 30 MIN: CPT | Performed by: FAMILY MEDICINE

## 2019-03-28 NOTE — PATIENT INSTRUCTIONS
.  Current Outpatient Prescriptions Ordered in [facility]   Medication Sig Dispense Refill   • ferrous sulfate 325 (65 Fe) MG EC tablet Take 1 Tab by mouth 2 times a day, with meals. 60 Tab 0   • tadalafil (CIALIS) 5 MG tablet Take 1 Tab by mouth every day. 90 Tab 4   • vitamin D (CHOLECALCIFEROL) 1000 UNIT Tab Take 1,000 Units by mouth every day.     • ezetimibe (ZETIA) 10 MG Tab Take 1 Tab by mouth every day. 90 Tab 4   • levothyroxine (SYNTHROID) 50 MCG Tab Take 1 Tab by mouth Every morning on an empty stomach. 90 Tab 4   • aspirin EC (ECOTRIN) 81 MG TBEC Take 1 Tab by mouth every day.     • azelastine (ASTELIN) 137 MCG/SPRAY nasal spray [place] 1 Spray in nose 2 times a day. 3 Bottle 4   • mometasone (NASONEX) 50 MCG/ACT nasal spray Spray 4 Sprays in nose every day. 3 Inhaler 4   • NON SPECIFIED Take 1 Each by mouth 2 Times a Day. Omega 3 Fish Oil (735 EPA/ 165 DHA)       No current Epic-ordered facility-administered medications on file.

## 2019-03-28 NOTE — PROGRESS NOTES
SUBJECTIVE:    Chief Complaint   Patient presents with   • Arthritis     Left knee   • Other     Hypotonic bladder   • Hypothyroidism   • Hyperlipidemia       Checo Hughes is a 68 y.o. male,   Established Patient     PROBLEM #1-HISTORY OF PRESENT ILLNESS  New Problem  PATIENT STATEMENT OF PROBLEM - left knee area discomfort  ONSET - months+  COURSE - he is very active, including skiing. He recently had x-rays of knees, and met with a chiropractor who does stem cell intraarticular injections. I reviewed x-rays with patient and his wife today with revealed medial joint space narrowing and DJD.   INTENSITY/STATUS/LOCATION/RADIATION - mild/ present/ left knee area (joint vs muscle?)/ no   AGGRAVATORS - overuse  RELIEVERS - symptoms resolve completely quickly  TREATMENTS/COMPLIANCE/@GOAL? - observation, exercise/ yes/ not 100% as above    PROBLEM #2-HISTORY OF PRESENT ILLNESS  Existing Problem, but requiring re-evaluation  PATIENT STATEMENT OF PROBLEM - BPH with lower urinary tract symptoms & Hypotonic bladder. S/P TURP.  ONSET - years  COURSE - chronic condition. Requires daily+ self bladder catheterization. Needs refills of catheters.   INTENSITY/STATUS/LOCATION/RADIATION - severe without catheterization/ present permanently/ bladder, prostate/ na  AGGRAVATORS - BPH with urinary retention leading to hypotonic bladder  RELIEVERS - self-catheterization, specialty care  TREATMENTS/COMPLIANCE/@GOAL? - same/ good/ yes, w/ catheterization    PROBLEM #3-HISTORY OF PRESENT ILLNESS  Existing Problem, but requiring re-evaluation  PATIENT STATEMENT OF PROBLEM - Dyslipidemia, upcoming annual well exam  ONSET - years  COURSE - due for labs/imaging. Counseled.     No Known Allergies    Patient Active Problem List    Diagnosis Date Noted   • Family history of heart failure 07/27/2018   • Trace Pitting edema, right distal lower leg and foot 07/12/2018   • Decreased cardiac ejection fraction at 50% 06/29/2018   • Abnormal  stress electrocardiogram test using treadmill 06/29/2018   • Atherosclerosis of right carotid artery 06/29/2018   • Ventricular ectopy 06/29/2018   • Non-sustained ventricular tachycardia (HCC) 06/29/2018   • Statin intolerance 06/29/2018   • PAD (peripheral artery disease) (HCC) 06/29/2018   • Abnormal stress echocardiogram 06/29/2018   • Family history of ischemic heart disease (IHD) 06/29/2018   • Abnormal ECG during exercise stress test 05/29/2018   • Insulin resistance 05/29/2018   • Need for shingles vaccine 05/29/2018   • Thickened cIMT (Vascular age of >80 vs Chronological Age of 67, in 2018) 05/29/2018   • Elevated creatine kinase level 05/29/2018   • Mild anemia 05/29/2018   • Low serum vitamin B12 05/29/2018   • Abnormal ultrasound of bladder 05/29/2018   • Elevated hemoglobin A1c 05/29/2018   • [employer] History and Physical 06/05/2017   • Simple renal cyst, left 06/05/2017   • Cyst of prostate 06/05/2017   • Calculus of gallbladder without cholecystitis without obstruction 06/05/2017   • Biliary sludge 06/05/2017   • Abnormal chest x-ray 2o trauma 06/05/2017   • Asymptomatic PVCs 06/05/2017   • Allergic rhinitis 05/01/2017   • Dupuytren's contracture of left hand 05/01/2017   • Well adult exam 05/01/2017   • BMI 26.0-26.9,adult 05/01/2017   • 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/2017   • Benign non-nodular prostatic hyperplasia with lower urinary tract symptoms 03/16/2017   • Non-seasonal allergic rhinitis due to pollen 03/16/2017   • Dyslipidemia 03/16/2017   • S/P TURP (status post transurethral resection of prostate) 01/29/2016   • BPH (benign prostatic hypertrophy) 01/29/2016   • Other specified hypothyroidism 01/29/2016   • Vitamin D deficiency 01/29/2016   • 1st degree AV block 07/14/2015   • Acute urinary retention 06/12/2015   • UTI (urinary tract infection) 06/12/2015   • Left-sided low back pain with sciatica 02/02/2015   • Carotid atherosclerosis (Very mild smooth plaque in the right ICA, per  U/S, in 2015) 12/08/2014   • Hypothyroidism 12/08/2014   • Inflamed seborrheic keratosis 12/08/2014   • Mixed hyperlipidemia with apolipoprotein E3 variant 11/24/2014   • Diverticulosis 10/08/2013   • Hyperlipidemia    • Eustachian tube dysfunction    • Sinus complaint 11/07/2012   • Finger joint stiff 11/07/2012   • BPH with obstruction/lower urinary tract symptoms 11/07/2012   • Allergic rhinitis due to pollen 11/07/2012   • Other malaise and fatigue 11/07/2012   • Encounter for therapeutic drug monitoring 11/07/2012   • Special screening for malignant neoplasm of prostate 11/07/2012       Outpatient Encounter Prescriptions as of 3/28/2019   Medication Sig Dispense Refill   • ferrous sulfate 325 (65 Fe) MG EC tablet Take 1 Tab by mouth 2 times a day, with meals. 60 Tab 0   • tadalafil (CIALIS) 5 MG tablet Take 1 Tab by mouth every day. 90 Tab 4   • vitamin D (CHOLECALCIFEROL) 1000 UNIT Tab Take 1,000 Units by mouth every day.     • ezetimibe (ZETIA) 10 MG Tab Take 1 Tab by mouth every day. 90 Tab 4   • levothyroxine (SYNTHROID) 50 MCG Tab Take 1 Tab by mouth Every morning on an empty stomach. 90 Tab 4   • aspirin EC (ECOTRIN) 81 MG TBEC Take 1 Tab by mouth every day.     • [DISCONTINUED] sulfamethoxazole-trimethoprim (BACTRIM DS) 800-160 MG tablet Take 1 Tab by mouth 2 times a day. 20 Tab 0   • azelastine (ASTELIN) 137 MCG/SPRAY nasal spray [place] 1 Spray in nose 2 times a day. 3 Bottle 4   • mometasone (NASONEX) 50 MCG/ACT nasal spray Spray 4 Sprays in nose every day. 3 Inhaler 4   • NON SPECIFIED Take 1 Each by mouth 2 Times a Day. Omega 3 Fish Oil (735 EPA/ 165 DHA)       No facility-administered encounter medications on file as of 3/28/2019.        Social History   Substance Use Topics   • Smoking status: Never Smoker   • Smokeless tobacco: Never Used   • Alcohol use 0.0 oz/week      Comment: 1 per day       Family History   Problem Relation Age of Onset   • Heart Disease Father 65        ? valvular heart  "disease   • Heart Disease Brother 65       Patient's Past, Social, and Family History reviewed and updated by me in EPIC today.    REVIEW OF SYMPTOMS:               Pertinent Positives as above.    All other systems reviewed and negative.     OBJECTIVE:    /90   Pulse 62   Temp 36.9 °C (98.5 °F) (Temporal)   Resp 12   Ht 1.854 m (6' 1\")   Wt 99.9 kg (220 lb 3.2 oz)   SpO2 98%   BMI 29.05 kg/m²   Body mass index is 29.05 kg/m².    Well developed, well nourished male, no acute distress, non-ill appearing. Comfortable, appears stated age, pleasant and cooperative  HEAD: atraumatic, normocephalic   EYES: Conjunctiva normal, extra-occular movements intact, PERRLA, acuity grossly intact.   EARS/NOSE/THROAT: TM's normal, no signs or symptoms of infection, no perforation, no hemotympanum, acuity grossly intact. Oropharynx: benign, no lesions noted. Nares: benign.   NECK: supple, no adenopathy, no thyromegaly or nodules, no jugular vein distention, no carotid bruits.   CHEST/LUNGS: clear to auscultation and percussion bilaterally. No adventitious breath sounds.   CARDIOVASCULAR: regular rate and rhythm, no murmur. Point of maximum intensity not displaced. Good central and peripheral pulses.   BACK: no CVA tenderness.   ABDOMEN: soft, non-tender, non-distended, no masses, no hepatosplenomegaly. Normal active bowel tones.   : deferred.   Rectal: deferred.   Extremities: warm/well-perfused, no cyanosis, clubbing, or edema. Knees FROM, asymptomatic presently   SKIN: clear, unbroken, no rashes, normal turgor.   Neuro: Mental Status: Alert and Oriented x 3. CN II-XII grossly intact. Gait normal. Non-focal, intact. Normal strength, sensation    ASSESSMENT:    1. Osteoarthritis of left knee, unspecified osteoarthritis type     2. Hypotonic bladder     3. BPH with obstruction/lower urinary tract symptoms     4. S/P TURP (status post transurethral resection of prostate)     5. Mixed hyperlipidemia         PLAN:    Total " Face-to-Face time spent with patient: 30 minutes  Amount of time spent counseling patient and/or coordinating care: 20 minutes    The nature of patient counseling as below:  -Patient Education, including below topics:  -Spouse Education  -Differential Diagnoses and treatment options discussed  -Risks, benefits, alternatives discussed  -Outside X-rays of knees reviewed with patient in detail  -Health Maintenance Exam issues discussed  -Therapeutic Lifestyle Changes discussed    The nature of coordination of care as below:  -Continue (other) present chronic medications  -Labs and other testing soon for upcoming Annual well exam  -Referrals: I recommended he consult with Dr. Campbell here in town  -Other: Bladder self-catheter supplies prescriptions refilled  -Seek medical attention immediately if worse    FOLLOW-UP:  -in 4 months  -and sooner if test/consult results warrant  And for Health Care Maintenance Exams  And as needed.

## 2019-03-29 DIAGNOSIS — Z00.00 WELL ADULT EXAM: ICD-10-CM

## 2019-06-17 ENCOUNTER — NON-PROVIDER VISIT (OUTPATIENT)
Dept: OTHER | Facility: MEDICAL CENTER | Age: 69
End: 2019-06-17
Payer: MEDICARE

## 2019-06-17 ENCOUNTER — HOSPITAL ENCOUNTER (OUTPATIENT)
Facility: MEDICAL CENTER | Age: 69
End: 2019-06-17
Attending: FAMILY MEDICINE
Payer: COMMERCIAL

## 2019-06-17 DIAGNOSIS — Z00.00 WELL ADULT EXAM: ICD-10-CM

## 2019-06-17 LAB
25(OH)D3 SERPL-MCNC: 22 NG/ML (ref 30–100)
ALBUMIN SERPL BCP-MCNC: 4.4 G/DL (ref 3.2–4.9)
ALBUMIN/GLOB SERPL: 1.3 G/DL
ALP SERPL-CCNC: 45 U/L (ref 30–99)
ALT SERPL-CCNC: 16 U/L (ref 2–50)
ANION GAP SERPL CALC-SCNC: 8 MMOL/L (ref 0–11.9)
APPEARANCE UR: CLEAR
AST SERPL-CCNC: 21 U/L (ref 12–45)
BACTERIA #/AREA URNS HPF: ABNORMAL /HPF
BASOPHILS # BLD AUTO: 0.9 % (ref 0–1.8)
BASOPHILS # BLD: 0.05 K/UL (ref 0–0.12)
BILIRUB SERPL-MCNC: 1.1 MG/DL (ref 0.1–1.5)
BILIRUB UR QL STRIP.AUTO: NEGATIVE
BUN SERPL-MCNC: 28 MG/DL (ref 8–22)
CALCIUM SERPL-MCNC: 9.4 MG/DL (ref 8.5–10.5)
CHLORIDE SERPL-SCNC: 107 MMOL/L (ref 96–112)
CHOLEST SERPL-MCNC: 191 MG/DL (ref 100–199)
CO2 SERPL-SCNC: 22 MMOL/L (ref 20–33)
COLOR UR: YELLOW
CREAT SERPL-MCNC: 1.07 MG/DL (ref 0.5–1.4)
CREAT UR-MCNC: 176.4 MG/DL
CRP SERPL HS-MCNC: 0.7 MG/L (ref 0–7.5)
EOSINOPHIL # BLD AUTO: 0.2 K/UL (ref 0–0.51)
EOSINOPHIL NFR BLD: 3.6 % (ref 0–6.9)
EPI CELLS #/AREA URNS HPF: ABNORMAL /HPF
ERYTHROCYTE [DISTWIDTH] IN BLOOD BY AUTOMATED COUNT: 52.7 FL (ref 35.9–50)
GLOBULIN SER CALC-MCNC: 3.3 G/DL (ref 1.9–3.5)
GLUCOSE SERPL-MCNC: 104 MG/DL (ref 65–99)
GLUCOSE UR STRIP.AUTO-MCNC: NEGATIVE MG/DL
HCT VFR BLD AUTO: 49.4 % (ref 42–52)
HCV AB SER QL: NEGATIVE
HDLC SERPL-MCNC: 43 MG/DL
HGB BLD-MCNC: 14.9 G/DL (ref 14–18)
IMM GRANULOCYTES # BLD AUTO: 0.01 K/UL (ref 0–0.11)
IMM GRANULOCYTES NFR BLD AUTO: 0.2 % (ref 0–0.9)
KETONES UR STRIP.AUTO-MCNC: NEGATIVE MG/DL
LDLC SERPL CALC-MCNC: 126 MG/DL
LEUKOCYTE ESTERASE UR QL STRIP.AUTO: ABNORMAL
LYMPHOCYTES # BLD AUTO: 1.77 K/UL (ref 1–4.8)
LYMPHOCYTES NFR BLD: 32.2 % (ref 22–41)
MCH RBC QN AUTO: 27.7 PG (ref 27–33)
MCHC RBC AUTO-ENTMCNC: 30.2 G/DL (ref 33.7–35.3)
MCV RBC AUTO: 92 FL (ref 81.4–97.8)
MICRO URNS: ABNORMAL
MICROALBUMIN UR-MCNC: 0.7 MG/DL
MICROALBUMIN/CREAT UR: 4 MG/G (ref 0–30)
MONOCYTES # BLD AUTO: 0.52 K/UL (ref 0–0.85)
MONOCYTES NFR BLD AUTO: 9.5 % (ref 0–13.4)
MUCOUS THREADS #/AREA URNS HPF: ABNORMAL /HPF
NEUTROPHILS # BLD AUTO: 2.94 K/UL (ref 1.82–7.42)
NEUTROPHILS NFR BLD: 53.6 % (ref 44–72)
NITRITE UR QL STRIP.AUTO: POSITIVE
NRBC # BLD AUTO: 0 K/UL
NRBC BLD-RTO: 0 /100 WBC
PH UR STRIP.AUTO: 5.5 [PH]
PLATELET # BLD AUTO: 253 K/UL (ref 164–446)
PMV BLD AUTO: 11.5 FL (ref 9–12.9)
POTASSIUM SERPL-SCNC: 4.4 MMOL/L (ref 3.6–5.5)
PROT SERPL-MCNC: 7.7 G/DL (ref 6–8.2)
PROT UR QL STRIP: NEGATIVE MG/DL
PSA SERPL-MCNC: 2.33 NG/ML (ref 0–4)
RBC # BLD AUTO: 5.37 M/UL (ref 4.7–6.1)
RBC # URNS HPF: ABNORMAL /HPF
RBC UR QL AUTO: ABNORMAL
SODIUM SERPL-SCNC: 137 MMOL/L (ref 135–145)
SP GR UR STRIP.AUTO: 1.02
TRIGL SERPL-MCNC: 112 MG/DL (ref 0–149)
TSH SERPL DL<=0.005 MIU/L-ACNC: 3.22 UIU/ML (ref 0.38–5.33)
UROBILINOGEN UR STRIP.AUTO-MCNC: 0.2 MG/DL
WBC # BLD AUTO: 5.5 K/UL (ref 4.8–10.8)
WBC #/AREA URNS HPF: ABNORMAL /HPF

## 2019-06-18 LAB
EST. AVERAGE GLUCOSE BLD GHB EST-MCNC: 111 MG/DL
HBA1C MFR BLD: 5.5 % (ref 0–5.6)

## 2019-06-19 LAB
SHBG SERPL-SCNC: 84 NMOL/L (ref 11–80)
TESTOST FREE MFR SERPL: 1 % (ref 1.6–2.9)
TESTOST FREE SERPL-MCNC: 59 PG/ML (ref 47–244)
TESTOST SERPL-MCNC: 580 NG/DL (ref 300–720)

## 2019-07-03 ENCOUNTER — APPOINTMENT (OUTPATIENT)
Dept: OTHER | Facility: MEDICAL CENTER | Age: 69
End: 2019-07-03
Payer: MEDICARE

## 2019-07-03 ENCOUNTER — HOSPITAL ENCOUNTER (OUTPATIENT)
Dept: RADIOLOGY | Facility: MEDICAL CENTER | Age: 69
End: 2019-07-03
Attending: FAMILY MEDICINE

## 2019-07-03 DIAGNOSIS — Z00.00 PHYSICAL EXAM, ROUTINE: ICD-10-CM

## 2019-07-03 PROCEDURE — 306723 US-TOTAL VASCULAR SCREENING (S/P)

## 2019-07-03 PROCEDURE — 0126T US-CIMT DUPLEX: CPT

## 2019-07-03 PROCEDURE — 76700 US EXAM ABDOM COMPLETE: CPT

## 2019-08-05 ENCOUNTER — OFFICE VISIT (OUTPATIENT)
Dept: OTHER | Facility: MEDICAL CENTER | Age: 69
End: 2019-08-05

## 2019-08-05 ENCOUNTER — NON-PROVIDER VISIT (OUTPATIENT)
Dept: OTHER | Facility: MEDICAL CENTER | Age: 69
End: 2019-08-05
Payer: MEDICARE

## 2019-08-05 VITALS
BODY MASS INDEX: 27.73 KG/M2 | RESPIRATION RATE: 14 BRPM | HEIGHT: 75 IN | TEMPERATURE: 96.6 F | OXYGEN SATURATION: 95 % | SYSTOLIC BLOOD PRESSURE: 126 MMHG | WEIGHT: 223 LBS | HEART RATE: 64 BPM | DIASTOLIC BLOOD PRESSURE: 84 MMHG

## 2019-08-05 DIAGNOSIS — R93.2 HIGHLY ECHOGENIC LIVER ON ULTRASOUND: ICD-10-CM

## 2019-08-05 DIAGNOSIS — R93.89 ABNORMAL CAROTID ULTRASOUND: ICD-10-CM

## 2019-08-05 DIAGNOSIS — Z82.49 FAMILY HISTORY OF ISCHEMIC HEART DISEASE (IHD): ICD-10-CM

## 2019-08-05 DIAGNOSIS — E03.9 HYPOTHYROIDISM, UNSPECIFIED TYPE: ICD-10-CM

## 2019-08-05 DIAGNOSIS — K80.20 GALLSTONES: ICD-10-CM

## 2019-08-05 DIAGNOSIS — E88.819 INSULIN RESISTANCE: ICD-10-CM

## 2019-08-05 DIAGNOSIS — E55.9 VITAMIN D DEFICIENCY: ICD-10-CM

## 2019-08-05 DIAGNOSIS — E78.2 MIXED HYPERLIPIDEMIA WITH APOLIPOPROTEIN E3 VARIANT: ICD-10-CM

## 2019-08-05 DIAGNOSIS — Z78.9 STATIN INTOLERANCE: ICD-10-CM

## 2019-08-05 DIAGNOSIS — N31.2 HYPOTONIC BLADDER: ICD-10-CM

## 2019-08-05 DIAGNOSIS — N28.1 BENIGN CYST OF LEFT KIDNEY: ICD-10-CM

## 2019-08-05 DIAGNOSIS — Z23 NEED FOR SHINGLES VACCINE: ICD-10-CM

## 2019-08-05 DIAGNOSIS — Z00.00 WELL ADULT EXAM: Primary | ICD-10-CM

## 2019-08-05 DIAGNOSIS — R73.01 ELEVATED FASTING GLUCOSE: ICD-10-CM

## 2019-08-05 DIAGNOSIS — I65.23 CAROTID ATHEROSCLEROSIS, BILATERAL: ICD-10-CM

## 2019-08-05 DIAGNOSIS — I73.9 PAD (PERIPHERAL ARTERY DISEASE) (HCC): ICD-10-CM

## 2019-08-05 RX ORDER — LEVOTHYROXINE SODIUM 0.05 MG/1
75 TABLET ORAL
Qty: 135 TAB | Refills: 4 | Status: SHIPPED | OUTPATIENT
Start: 2019-08-05 | End: 2020-01-06

## 2019-08-05 NOTE — LETTER
"August 20, 2019        Checo Hughes  [id number] [place] [address]        Dear Checo:    Thank you for again participating in our executive health program.  Please review the information in this packet carefully.  Please see your new primary next 1 to 3 months.    In terms of cardio metabolic related issues, you have the following: Hypothyroidism, dyslipidemia, peripheral artery disease, insulin resistance, advanced vascular age per CIM T, bilateral carotid atherosclerosis, statin intolerance, family history, elevated fasting glucose, fatty liver on ultrasound, and vitamin D deficiency.The best treatment for most of these conditions is Therapeutic Lifestyle Changes.  Specifically, I strongly recommend eating \"real food, mostly plants, not too much\", daily exercise, striving for a normal weight/BMI, active stress management, 7-8 hours of quality sleep per night, a loving social environment, and an altruistic philosophy.  I did increase your Levothyroxine to 75 mcg/day and I recommend you have a repeat TSH and office visit in 3 months. Please attempt to obtain the Shingrix vaccine series at your local pharmacy.     If you have any questions or concerns, please don't hesitate to call.        Sincerely,        Trav Cooley M.D.    ASSESSMENT:    Encounter Diagnoses   Name Primary?   • Executive History and Physical Examination  Yes   • Hypothyroidism, unspecified type    • Hypotonic bladder    • Mixed hyperlipidemia with apolipoprotein E3 variant    • Need for shingles vaccine    • PAD (peripheral artery disease) (HCC)    • Insulin resistance    • Thickened cIMT (Vascular age of >80 vs Chronological Age of 67, in 2018)    • Carotid atherosclerosis, bilateral    • Statin intolerance    • Family history of ischemic heart disease (IHD)    • Elevated fasting glucose    • Highly echogenic liver on ultrasound    • Gallstones    • Benign cyst of left kidney    • Vitamin D deficiency        PLAN:    Total " Face-to-Face time spent with patient: 90 minutes  Amount of time spent counseling patient and/or coordinating care: 50 minutes    The nature of patient counseling as below:  -Patient Education  -Differential Diagnoses and treatment options discussed  -Risks, benefits, alternatives discussed  -Labs & other reports reviewed with patient in detail  -Health Maintenance Exam issues discussed  -Exercise  -Dietary recommendations discussed  -Weight Loss strategies discussed  -Therapeutic Lifestyle Changes discussed    The nature of coordination of care as below:  -Medications added: none  -Medications discontinued: none  -Medications adjusted: Levothyroxine increased to 75 mcg/day  -Continue present chronic medications  -Referrals: none  -Other: recheck TSH and evaluate fatigue in 3 months  -Seek medical attention immediately if worse    FOLLOW-UP:  -With Primary Care Provider in 3 months

## 2019-08-07 DIAGNOSIS — E78.2 MIXED HYPERLIPIDEMIA WITH APOLIPOPROTEIN E3 VARIANT: ICD-10-CM

## 2019-08-07 RX ORDER — EZETIMIBE 10 MG/1
TABLET ORAL
Qty: 90 TAB | Refills: 4 | Status: SHIPPED | OUTPATIENT
Start: 2019-08-07 | End: 2020-05-08

## 2019-08-20 NOTE — PATIENT INSTRUCTIONS
Current Outpatient Medications Ordered in Epic   Medication Sig Dispense Refill   • levothyroxine (SYNTHROID) 50 MCG Tab Take 1.5 Tabs by mouth Every morning on an empty stomach. 135 Tab 4   • ferrous sulfate 325 (65 Fe) MG EC tablet Take 1 Tab by mouth 2 times a day, with meals. 60 Tab 0   • tadalafil (CIALIS) 5 MG tablet Take 1 Tab by mouth every day. 90 Tab 4   • vitamin D (CHOLECALCIFEROL) 1000 UNIT Tab Take 1,000 Units by mouth every day.     • aspirin EC (ECOTRIN) 81 MG TBEC Take 1 Tab by mouth every day.     • ezetimibe (ZETIA) 10 MG Tab TAKE 1 TABLET DAILY 90 Tab 4   • NON SPECIFIED Take 1 Each by mouth 2 Times a Day. Omega 3 Fish Oil (735 EPA/ 165 DHA)       No current Epic-ordered facility-administered medications on file.        DISPLAY PLAN FREE TEXT

## 2019-08-20 NOTE — PROGRESS NOTES
"[facility]    EXECUTIVE HISTORY AND PHYSICAL  Performed by Dr. Trav Cooley    SUBJECTIVE:    Chief Complaint   Patient presents with   • Executive Physical   • Hyperlipidemia   • Hyperglycemia   • Abnormal Labs     BUN 28   • Abnormal Labs     Abnormal U/A (self catheterizes)   • Other     Mild bilateral carotid atherosclerosis   • Other     Atherosclerosis, Right tibial artery   • Other     Fatty liver, gallstones, left kidney cyst   • Vitamin D Deficiency   • Hypothyroidism   • Immunizations     Due for Shingrix       Checo Hughes is a 69 y.o. male,   Established Patient @ [facility]    Preventive medicine issues discussed:  abuse, aspirin, dental, Alcohol, Tobacco, HIV/AIDS, injuries, mental health/depression, nutrition, exercise, occupational health, sexual behavior, UV exposure, advanced directives, Cancer Screening. Vaccines.      PROBLEM #1-HISTORY OF PRESENT ILLNESS  PATIENT STATEMENT OF PROBLEM - Cardio-metabolic related issues  ONSET - re-discussed today  COURSE - No ASCVD event history. BMI: 27.9. WC: 41.5\". Takes Ezetimibe, aspirin, Vitamin D. CTCS: 0 in 2017. Recent Imaging: mild bilateral carotid atherosclerosis, atherosclerosis of right tibial artery. CIMT: >80 vs 67. Fatty liver, gallstones, left kidney cyst.  Current pertinent labs:    HIGH & INT RISK: LDL/HDL/LDL-P/FBS(104)/BUN(28)/abnormal UA (self catheterizes)   Counseled  INTENSITY/STATUS/LOCATION/RADIATION - variable/ as above  AGGRAVATORS - Multifactorial including inadequate Therapeutic Lifestyle Changes    RELIEVERS - some Therapeutic Lifestyle Changes, medications  TREATMENTS/COMPLIANCE/@GOAL? - as above/ as above/ no     PROBLEM #2-HISTORY OF PRESENT ILLNESS  Existing Problem  PATIENT STATEMENT OF PROBLEM - Hypothyroidism  ONSET - years  COURSE - TSH > 3. He is having some unexplained fatigue. Counseled.     PROBLEM #3-HISTORY OF PRESENT ILLNESS  PATIENT STATEMENT OF PROBLEM - HME " issues  ONSET - discussed today  COURSE - Rx given for Shingrix at local pharmacy.     Diagnosing METABOLIC SYNDROME  -?-Must have 3 or more of the following 5 Risk Factors(Patient meets criteria # 1,5)     RISK FACTOR    DEFINING LEVEL  1-Abdominal Obesity        Waist [email or web address]   Men (North Americans)  >102 cm (>40 inches)   Women (North Americans)  >88 cm (>35 inches)  (see literature for Ethnic Group waist circumference differences)  2-Triglycerides ?150 mg/dL (or on treatment for this lipid disorder)  3-HDL Cholesterol    Men  <40 mg/dL (or on treatment for this lipid disorder)   Women <50 mg/dL (or on treatment for this lipid disorder)  4-Blood Pressure  ?130 systolic or ?84 diastolic (or on HTN treatment)  5-Fasting Glucose ?100 mg/dL(or previously diagnosed DM or Ins. Resistance)  (FYI: If FBS ?100 mg/dL, then patient also has Insulin Resistance)    Synonyms  Hypertension-hyperglycemia-hyperuricemia syndrome   Syndrome X   Dysmetabolic syndrome X   Insulin resistance syndrome   Metabolic dyslipidemia   The deadly quartet (upper-body obesity, glucose intolerance, hypertriglyceridemia, and hypertension)   Civilization syndrome  Reaven Syndrome    Diagnosing INSULIN RESISTANCE: Any 1 of following (Patient meets criteria # 3)  1. Presence of METABOLIC SYNDROME  2. TRIGLYCERIDE/HDL RATIO:  - >3.5 = IR in Caucasians  - ?3.0 = IR in /Green[place]ic Americans  - ?2.0 = IR in Non- Blacks  3. Fasting Blood Sugar ? 100 mg/dL         (If FBS > 126, then DM2)  4. Oral Glucose Tolerance Test   - One hour glucose: ? 125 mg/dL   - (If > 150, significantly increased risk of developing DM2)  - Two hour glucose: ? 120 mg/dL  - (120-139=only 33% B-cell fx. 140-199=only 15% B-cell fx)   - (200 or above=DM2 and only 10% B-cell fx.)  - PRE-DIABETES, a type of Insulin Resistance:  o Two hour glucose of 140 to 199  5. A1c ? 6.5%                     (or ? 5.7 % AND the following 2 Dental  Parameters:    1- ? 26% of Gum Pockets are ?5mm depth    2- ? 4 Missing Teeth)       No Known Allergies    Patient Active Problem List    Diagnosis Date Noted   • Family history of heart failure 07/27/2018   • Trace Pitting edema, right distal lower leg and foot 07/12/2018   • Decreased cardiac ejection fraction at 50% 06/29/2018   • Abnormal stress electrocardiogram test using treadmill 06/29/2018   • Atherosclerosis of right carotid artery 06/29/2018   • Ventricular ectopy 06/29/2018   • Non-sustained ventricular tachycardia (HCC) 06/29/2018   • Statin intolerance 06/29/2018   • PAD (peripheral artery disease) (HCC) 06/29/2018   • Abnormal stress echocardiogram 06/29/2018   • Family history of ischemic heart disease (IHD) 06/29/2018   • Abnormal ECG during exercise stress test 05/29/2018   • Insulin resistance 05/29/2018   • Need for shingles vaccine 05/29/2018   • Thickened cIMT (Vascular age of >80 vs Chronological Age of 67, in 2018) 05/29/2018   • Elevated creatine kinase level 05/29/2018   • Mild anemia 05/29/2018   • Low serum vitamin B12 05/29/2018   • Abnormal ultrasound of bladder 05/29/2018   • Elevated hemoglobin A1c 05/29/2018   • Platinum Executive History and Physical 06/05/2017   • Simple renal cyst, left 06/05/2017   • Cyst of prostate 06/05/2017   • Calculus of gallbladder without cholecystitis without obstruction 06/05/2017   • Biliary sludge 06/05/2017   • Abnormal chest x-ray 2o trauma 06/05/2017   • Asymptomatic PVCs 06/05/2017   • Allergic rhinitis 05/01/2017   • Dupuytren's contracture of left hand 05/01/2017   • Well adult exam 05/01/2017   • BMI 26.0-26.9,adult 05/01/2017   • 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/2017   • Benign non-nodular prostatic hyperplasia with lower urinary tract symptoms 03/16/2017   • Non-seasonal allergic rhinitis due to pollen 03/16/2017   • Dyslipidemia 03/16/2017   • S/P TURP (status post transurethral resection of prostate) 01/29/2016   • BPH (benign prostatic hypertrophy)  01/29/2016   • Other specified hypothyroidism 01/29/2016   • Vitamin D deficiency 01/29/2016   • 1st degree AV block 07/14/2015   • Acute urinary retention 06/12/2015   • UTI (urinary tract infection) 06/12/2015   • Left-sided low back pain with sciatica 02/02/2015   • Carotid atherosclerosis (Very mild smooth plaque in the right ICA, per U/S, in 2015) 12/08/2014   • Hypothyroidism 12/08/2014   • Inflamed seborrheic keratosis 12/08/2014   • Mixed hyperlipidemia with apolipoprotein E3 variant 11/24/2014   • Diverticulosis 10/08/2013   • Hyperlipidemia    • Eustachian tube dysfunction    • Sinus complaint 11/07/2012   • Finger joint stiff 11/07/2012   • BPH with obstruction/lower urinary tract symptoms 11/07/2012   • Allergic rhinitis due to pollen 11/07/2012   • Other malaise and fatigue 11/07/2012   • Encounter for therapeutic drug monitoring 11/07/2012   • Special screening for malignant neoplasm of prostate 11/07/2012       Current Outpatient Medications on File Prior to Visit   Medication Sig Dispense Refill   • ferrous sulfate 325 (65 Fe) MG EC tablet Take 1 Tab by mouth 2 times a day, with meals. 60 Tab 0   • tadalafil (CIALIS) 5 MG tablet Take 1 Tab by mouth every day. 90 Tab 4   • vitamin D (CHOLECALCIFEROL) 1000 UNIT Tab Take 1,000 Units by mouth every day.     • aspirin EC (ECOTRIN) 81 MG TBEC Take 1 Tab by mouth every day.     • NON SPECIFIED Take 1 Each by mouth 2 Times a Day. Omega 3 Fish Oil (735 EPA/ 165 DHA)       No current facility-administered medications on file prior to visit.        Social History     Socioeconomic History   • Marital status:      Spouse name: Not on file   • Number of children: Not on file   • Years of education: Not on file   • Highest education level: Not on file   Occupational History   • Not on file   Social Needs   • Financial resource strain: Not on file   • Food insecurity:     Worry: Not on file     Inability: Not on file   • Transportation needs:     Medical:  "Not on file     Non-medical: Not on file   Tobacco Use   • Smoking status: Never Smoker   • Smokeless tobacco: Never Used   Substance and Sexual Activity   • Alcohol use: Yes     Alcohol/week: 0.0 oz     Comment: 1 per day   • Drug use: No   • Sexual activity: Yes     Partners: Female   Lifestyle   • Physical activity:     Days per week: Not on file     Minutes per session: Not on file   • Stress: Not on file   Relationships   • Social connections:     Talks on phone: Not on file     Gets together: Not on file     Attends Church service: Not on file     Active member of club or organization: Not on file     Attends meetings of clubs or organizations: Not on file     Relationship status: Not on file   • Intimate partner violence:     Fear of current or ex partner: Not on file     Emotionally abused: Not on file     Physically abused: Not on file     Forced sexual activity: Not on file   Other Topics Concern   • Not on file   Social History Narrative   • Not on file       Family History   Problem Relation Age of Onset   • Heart Disease Father 65        ? valvular heart disease   • Heart Disease Brother 65       Patient's Past, Social, and Family History reviewed     REVIEW OF SYMPTOMS:               Pertinent Positives as above.    All other systems reviewed and negative.     OBJECTIVE:    /84   Pulse 64   Temp 35.9 °C (96.6 °F) (Temporal)   Resp 14   Ht 1.905 m (6' 3\")   Wt 101.2 kg (223 lb)   SpO2 95%   BMI 27.87 kg/m²   Body mass index is 27.87 kg/m².    Well developed, well nourished male, no acute distress, non-ill appearing. Comfortable, appears stated age, pleasant and cooperative, Alert and Oriented x 3.   HEAD: atraumatic, normocephalic   EYES: Conjunctiva normal, EOMI, PERRLA, acuity grossly intact.   EARS/NOSE/THROAT: TM's normal, no SSX of infection, no perforation, no hemotympanum, acuity grossly intact. Oropharynx: benign, no lesions noted. Nares: benign.   NECK: supple, no adenopathy, no " thyromegaly or nodules, no JVD, no carotid bruits.   CHEST/LUNGS: clear to auscultation and percussion bilaterally. No adventitious breath sounds.   CARDIOVASCULAR: regular rate and rhythm, no murmur. PMI not displaced. Good central and peripheral pulses.   BACK: no CVA tenderness.   ABDOMEN: soft, non-tender, non-distended, no masses, no hepatosplenomegaly. Normal active bowel tones.   : deferred.   Rectal: RAYMON normal  Extremities: warm/well-perfused, no cyanosis, clubbing, or edema.   SKIN: clear, unbroken, no rashes, normal turgor.   Neuro: Mental Status: Alert and Oriented x 3. CN II-XII grossly intact. Gait normal. Non-focal, intact. Normal strength, sensation    ASSESSMENT:    Encounter Diagnoses   Name Primary?   • Executive History and Physical Examination  Yes   • Hypothyroidism, unspecified type    • Hypotonic bladder    • Mixed hyperlipidemia with apolipoprotein E3 variant    • Need for shingles vaccine    • PAD (peripheral artery disease) (HCC)    • Insulin resistance    • Thickened cIMT (Vascular age of >80 vs Chronological Age of 67, in 2018)    • Carotid atherosclerosis, bilateral    • Statin intolerance    • Family history of ischemic heart disease (IHD)    • Elevated fasting glucose    • Highly echogenic liver on ultrasound    • Gallstones    • Benign cyst of left kidney    • Vitamin D deficiency        PLAN:    Total Face-to-Face time spent with patient: 90 minutes  Amount of time spent counseling patient and/or coordinating care: 50 minutes    The nature of patient counseling as below:  -Patient Education  -Differential Diagnoses and treatment options discussed  -Risks, benefits, alternatives discussed  -Labs & other reports reviewed with patient in detail  -Health Maintenance Exam issues discussed  -Exercise  -Dietary recommendations discussed  -Weight Loss strategies discussed  -Therapeutic Lifestyle Changes discussed    The nature of coordination of care as below:  -Medications added:  none  -Medications discontinued: none  -Medications adjusted: Levothyroxine increased to 75 mcg/day  -Continue present chronic medications  -Referrals: none  -Other: recheck TSH and evaluate fatigue in 3 months  -Seek medical attention immediately if worse    FOLLOW-UP:  -With Primary Care Provider in 3 months

## 2019-10-02 ENCOUNTER — NON-PROVIDER VISIT (OUTPATIENT)
Dept: INTERNAL MEDICINE | Facility: IMAGING CENTER | Age: 69
End: 2019-10-02
Payer: MEDICARE

## 2019-10-02 DIAGNOSIS — Z23 NEED FOR INFLUENZA VACCINATION: ICD-10-CM

## 2019-10-02 PROCEDURE — 90662 IIV NO PRSV INCREASED AG IM: CPT | Performed by: INTERNAL MEDICINE

## 2019-10-02 PROCEDURE — G0008 ADMIN INFLUENZA VIRUS VAC: HCPCS | Performed by: INTERNAL MEDICINE

## 2019-11-13 ENCOUNTER — OFFICE VISIT (OUTPATIENT)
Dept: INTERNAL MEDICINE | Facility: IMAGING CENTER | Age: 69
End: 2019-11-13
Payer: MEDICARE

## 2019-11-13 VITALS
WEIGHT: 214 LBS | BODY MASS INDEX: 26.61 KG/M2 | RESPIRATION RATE: 14 BRPM | OXYGEN SATURATION: 98 % | SYSTOLIC BLOOD PRESSURE: 100 MMHG | TEMPERATURE: 98.2 F | HEIGHT: 75 IN | DIASTOLIC BLOOD PRESSURE: 60 MMHG | HEART RATE: 60 BPM

## 2019-11-13 DIAGNOSIS — E55.9 VITAMIN D DEFICIENCY: ICD-10-CM

## 2019-11-13 DIAGNOSIS — F32.A ANXIETY AND DEPRESSION: ICD-10-CM

## 2019-11-13 DIAGNOSIS — E03.9 HYPOTHYROIDISM (ACQUIRED): ICD-10-CM

## 2019-11-13 DIAGNOSIS — J06.9 ACUTE URI: ICD-10-CM

## 2019-11-13 DIAGNOSIS — R73.01 FASTING HYPERGLYCEMIA: ICD-10-CM

## 2019-11-13 DIAGNOSIS — Z79.899 LONG TERM USE OF DRUG: ICD-10-CM

## 2019-11-13 DIAGNOSIS — E78.00 HYPERCHOLESTEROLEMIA: ICD-10-CM

## 2019-11-13 DIAGNOSIS — F41.9 ANXIETY AND DEPRESSION: ICD-10-CM

## 2019-11-13 DIAGNOSIS — Z86.2 HISTORY OF ANEMIA: ICD-10-CM

## 2019-11-13 PROBLEM — D64.9 MILD ANEMIA: Status: RESOLVED | Noted: 2018-05-29 | Resolved: 2019-11-13

## 2019-11-13 PROBLEM — R93.89 ABNORMAL CHEST X-RAY: Status: RESOLVED | Noted: 2017-06-05 | Resolved: 2019-11-13

## 2019-11-13 PROBLEM — N42.83 CYST OF PROSTATE: Status: RESOLVED | Noted: 2017-06-05 | Resolved: 2019-11-13

## 2019-11-13 PROBLEM — N31.2 HYPOTONIC BLADDER: Status: RESOLVED | Noted: 2017-03-16 | Resolved: 2019-11-13

## 2019-11-13 PROBLEM — R73.09 ELEVATED HEMOGLOBIN A1C: Status: RESOLVED | Noted: 2018-05-29 | Resolved: 2019-11-13

## 2019-11-13 PROBLEM — R60.9 PITTING EDEMA: Status: RESOLVED | Noted: 2018-07-12 | Resolved: 2019-11-13

## 2019-11-13 PROBLEM — R93.41 ABNORMAL ULTRASOUND OF BLADDER: Status: RESOLVED | Noted: 2018-05-29 | Resolved: 2019-11-13

## 2019-11-13 PROCEDURE — 99214 OFFICE O/P EST MOD 30 MIN: CPT | Performed by: INTERNAL MEDICINE

## 2019-11-13 RX ORDER — CITALOPRAM HYDROBROMIDE 10 MG/1
10 TABLET ORAL DAILY
Qty: 90 TAB | Refills: 3 | Status: SHIPPED | OUTPATIENT
Start: 2019-11-13 | End: 2020-05-08

## 2019-11-13 RX ORDER — LORAZEPAM 0.5 MG/1
0.5 TABLET ORAL 2 TIMES DAILY PRN
Qty: 60 TAB | Refills: 2 | Status: SHIPPED
Start: 2019-11-13 | End: 2019-12-13

## 2019-11-13 RX ORDER — LORAZEPAM 0.5 MG/1
0.5 TABLET ORAL EVERY 4 HOURS PRN
Qty: 30 TAB | Refills: 2 | Status: SHIPPED
Start: 2019-11-13 | End: 2020-02-11

## 2019-11-13 RX ORDER — AZITHROMYCIN 500 MG/1
500 TABLET, FILM COATED ORAL DAILY
Qty: 6 TAB | Refills: 0 | Status: SHIPPED | OUTPATIENT
Start: 2019-11-13 | End: 2020-01-06
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
## 2019-12-16 ENCOUNTER — NON-PROVIDER VISIT (OUTPATIENT)
Dept: INTERNAL MEDICINE | Facility: IMAGING CENTER | Age: 69
End: 2019-12-16
Payer: MEDICARE

## 2019-12-16 ENCOUNTER — HOSPITAL ENCOUNTER (OUTPATIENT)
Facility: MEDICAL CENTER | Age: 69
End: 2019-12-16
Attending: INTERNAL MEDICINE
Payer: MEDICARE

## 2019-12-16 DIAGNOSIS — F32.A ANXIETY AND DEPRESSION: ICD-10-CM

## 2019-12-16 DIAGNOSIS — F41.9 ANXIETY AND DEPRESSION: ICD-10-CM

## 2019-12-16 DIAGNOSIS — E03.9 HYPOTHYROIDISM (ACQUIRED): ICD-10-CM

## 2019-12-16 DIAGNOSIS — Z86.2 HISTORY OF ANEMIA: ICD-10-CM

## 2019-12-16 DIAGNOSIS — Z01.89 ENCOUNTER FOR ROUTINE LABORATORY TESTING: ICD-10-CM

## 2019-12-16 DIAGNOSIS — Z79.899 LONG TERM USE OF DRUG: ICD-10-CM

## 2019-12-16 DIAGNOSIS — R73.01 FASTING HYPERGLYCEMIA: ICD-10-CM

## 2019-12-16 DIAGNOSIS — E78.00 HYPERCHOLESTEROLEMIA: ICD-10-CM

## 2019-12-16 DIAGNOSIS — E55.9 VITAMIN D DEFICIENCY: ICD-10-CM

## 2019-12-16 LAB
25(OH)D3 SERPL-MCNC: 27 NG/ML (ref 30–100)
ALBUMIN SERPL BCP-MCNC: 4.6 G/DL (ref 3.2–4.9)
ALBUMIN/GLOB SERPL: 1.4 G/DL
ALP SERPL-CCNC: 47 U/L (ref 30–99)
ALT SERPL-CCNC: 14 U/L (ref 2–50)
ANION GAP SERPL CALC-SCNC: 9 MMOL/L (ref 0–11.9)
AST SERPL-CCNC: 21 U/L (ref 12–45)
BASOPHILS # BLD AUTO: 0.4 % (ref 0–1.8)
BASOPHILS # BLD: 0.02 K/UL (ref 0–0.12)
BILIRUB SERPL-MCNC: 1 MG/DL (ref 0.1–1.5)
BUN SERPL-MCNC: 24 MG/DL (ref 8–22)
CALCIUM SERPL-MCNC: 9.8 MG/DL (ref 8.5–10.5)
CHLORIDE SERPL-SCNC: 103 MMOL/L (ref 96–112)
CK SERPL-CCNC: 132 U/L (ref 0–154)
CO2 SERPL-SCNC: 27 MMOL/L (ref 20–33)
CREAT SERPL-MCNC: 1.18 MG/DL (ref 0.5–1.4)
EOSINOPHIL # BLD AUTO: 0.2 K/UL (ref 0–0.51)
EOSINOPHIL NFR BLD: 3.6 % (ref 0–6.9)
ERYTHROCYTE [DISTWIDTH] IN BLOOD BY AUTOMATED COUNT: 47.5 FL (ref 35.9–50)
FERRITIN SERPL-MCNC: 22.4 NG/ML (ref 22–322)
GLOBULIN SER CALC-MCNC: 3.2 G/DL (ref 1.9–3.5)
GLUCOSE SERPL-MCNC: 97 MG/DL (ref 65–99)
HCT VFR BLD AUTO: 52 % (ref 42–52)
HGB BLD-MCNC: 17 G/DL (ref 14–18)
IMM GRANULOCYTES # BLD AUTO: 0.01 K/UL (ref 0–0.11)
IMM GRANULOCYTES NFR BLD AUTO: 0.2 % (ref 0–0.9)
IRON SATN MFR SERPL: 43 % (ref 15–55)
IRON SERPL-MCNC: 171 UG/DL (ref 50–180)
LYMPHOCYTES # BLD AUTO: 1.76 K/UL (ref 1–4.8)
LYMPHOCYTES NFR BLD: 32.1 % (ref 22–41)
MCH RBC QN AUTO: 32 PG (ref 27–33)
MCHC RBC AUTO-ENTMCNC: 32.7 G/DL (ref 33.7–35.3)
MCV RBC AUTO: 97.7 FL (ref 81.4–97.8)
MONOCYTES # BLD AUTO: 0.38 K/UL (ref 0–0.85)
MONOCYTES NFR BLD AUTO: 6.9 % (ref 0–13.4)
NEUTROPHILS # BLD AUTO: 3.12 K/UL (ref 1.82–7.42)
NEUTROPHILS NFR BLD: 56.8 % (ref 44–72)
NRBC # BLD AUTO: 0 K/UL
NRBC BLD-RTO: 0 /100 WBC
PLATELET # BLD AUTO: 221 K/UL (ref 164–446)
PMV BLD AUTO: 10.9 FL (ref 9–12.9)
POTASSIUM SERPL-SCNC: 4.1 MMOL/L (ref 3.6–5.5)
PROT SERPL-MCNC: 7.8 G/DL (ref 6–8.2)
RBC # BLD AUTO: 5.32 M/UL (ref 4.7–6.1)
SODIUM SERPL-SCNC: 139 MMOL/L (ref 135–145)
T3 SERPL-MCNC: 103.9 NG/DL (ref 60–181)
TIBC SERPL-MCNC: 395 UG/DL (ref 250–450)
TSH SERPL DL<=0.005 MIU/L-ACNC: 3.66 UIU/ML (ref 0.38–5.33)
VIT B12 SERPL-MCNC: 198 PG/ML (ref 211–911)
WBC # BLD AUTO: 5.5 K/UL (ref 4.8–10.8)

## 2019-12-16 PROCEDURE — 82607 VITAMIN B-12: CPT

## 2019-12-16 PROCEDURE — 83704 LIPOPROTEIN BLD QUAN PART: CPT

## 2019-12-16 PROCEDURE — 83540 ASSAY OF IRON: CPT

## 2019-12-16 PROCEDURE — 80053 COMPREHEN METABOLIC PANEL: CPT

## 2019-12-16 PROCEDURE — 82728 ASSAY OF FERRITIN: CPT

## 2019-12-16 PROCEDURE — 82550 ASSAY OF CK (CPK): CPT

## 2019-12-16 PROCEDURE — 80061 LIPID PANEL: CPT

## 2019-12-16 PROCEDURE — 85025 COMPLETE CBC W/AUTO DIFF WBC: CPT

## 2019-12-16 PROCEDURE — 82306 VITAMIN D 25 HYDROXY: CPT

## 2019-12-16 PROCEDURE — 84443 ASSAY THYROID STIM HORMONE: CPT

## 2019-12-16 PROCEDURE — 84480 ASSAY TRIIODOTHYRONINE (T3): CPT

## 2019-12-16 PROCEDURE — 83550 IRON BINDING TEST: CPT

## 2019-12-19 LAB
CHOLEST SERPL-MCNC: 206 MG/DL
HDL PARTICAL NO Q4363: 32.8 UMOL/L
HDL SIZE Q4361: 8.4 NM
HDLC SERPL-MCNC: 42 MG/DL (ref 40–59)
HLD.LARGE SERPL-SCNC: <2.8 UMOL/L
L VLDL PART NO Q4357: ABNORMAL NMOL/L
LDL SERPL QN: 20.3 NM
LDL SERPL-SCNC: 1755 NMOL/L
LDL SMALL SERPL-SCNC: 1075 NMOL/L
LDLC SERPL CALC-MCNC: 131 MG/DL
PATHOLOGY STUDY: ABNORMAL
TRIGL SERPL-MCNC: 165 MG/DL (ref 30–149)
VLDL SIZE Q4362: 45.6 NM

## 2020-01-06 ENCOUNTER — OFFICE VISIT (OUTPATIENT)
Dept: INTERNAL MEDICINE | Facility: IMAGING CENTER | Age: 70
End: 2020-01-06
Payer: MEDICARE

## 2020-01-06 VITALS
TEMPERATURE: 98.2 F | SYSTOLIC BLOOD PRESSURE: 102 MMHG | HEART RATE: 60 BPM | WEIGHT: 218 LBS | RESPIRATION RATE: 14 BRPM | BODY MASS INDEX: 27.1 KG/M2 | OXYGEN SATURATION: 95 % | HEIGHT: 75 IN | DIASTOLIC BLOOD PRESSURE: 66 MMHG

## 2020-01-06 DIAGNOSIS — E78.2 HYPERLIPIDEMIA, MIXED: ICD-10-CM

## 2020-01-06 DIAGNOSIS — E55.9 VITAMIN D DEFICIENCY: ICD-10-CM

## 2020-01-06 DIAGNOSIS — Z79.899 LONG TERM USE OF DRUG: ICD-10-CM

## 2020-01-06 DIAGNOSIS — I10 ESSENTIAL HYPERTENSION: ICD-10-CM

## 2020-01-06 DIAGNOSIS — E53.8 B12 DEFICIENCY: ICD-10-CM

## 2020-01-06 DIAGNOSIS — E03.9 HYPOTHYROIDISM (ACQUIRED): ICD-10-CM

## 2020-01-06 PROCEDURE — G0439 PPPS, SUBSEQ VISIT: HCPCS | Performed by: INTERNAL MEDICINE

## 2020-01-06 RX ORDER — UBIDECARENONE 200 MG
300 CAPSULE ORAL DAILY
Qty: 100 CAP | Refills: 3 | COMMUNITY
Start: 2020-01-06

## 2020-01-06 RX ORDER — CHOLECALCIFEROL (VITAMIN D3) 50 MCG
3000 TABLET ORAL DAILY
Qty: 100 TAB | Refills: 3 | COMMUNITY
Start: 2020-01-06

## 2020-01-06 RX ORDER — ROSUVASTATIN CALCIUM 10 MG/1
5 TABLET, COATED ORAL
Qty: 45 TAB | Refills: 3
Start: 2020-01-06 | End: 2021-05-17 | Stop reason: SDUPTHER

## 2020-01-06 RX ORDER — LEVOTHYROXINE SODIUM 0.05 MG/1
75 TABLET ORAL
Qty: 135 TAB | Refills: 3
Start: 2020-01-06 | End: 2020-03-15

## 2020-01-06 ASSESSMENT — ENCOUNTER SYMPTOMS: GENERAL WELL-BEING: EXCELLENT

## 2020-01-06 ASSESSMENT — PATIENT HEALTH QUESTIONNAIRE - PHQ9: CLINICAL INTERPRETATION OF PHQ2 SCORE: 0

## 2020-01-06 ASSESSMENT — ACTIVITIES OF DAILY LIVING (ADL): BATHING_REQUIRES_ASSISTANCE: 0
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
## 2020-02-26 ENCOUNTER — NON-PROVIDER VISIT (OUTPATIENT)
Dept: INTERNAL MEDICINE | Facility: IMAGING CENTER | Age: 70
End: 2020-02-26
Payer: MEDICARE

## 2020-02-26 VITALS
SYSTOLIC BLOOD PRESSURE: 140 MMHG | HEART RATE: 46 BPM | TEMPERATURE: 97.9 F | BODY MASS INDEX: 27.1 KG/M2 | OXYGEN SATURATION: 98 % | HEIGHT: 75 IN | DIASTOLIC BLOOD PRESSURE: 70 MMHG | RESPIRATION RATE: 14 BRPM | WEIGHT: 218 LBS

## 2020-02-26 NOTE — NON-PROVIDER
"Patient walks in to office after he tried to donate blood this morning.  He was turned away for \"irregular heart rate.\"  He is completely asymptomatic.  Cardiac work up for PVC's 7/2018.  He is under a tremendous amount of stress currently - wife with ovarian cancer; developmentally delayed son with financial concerns.  Per Dr De - cardiology appointment ASAP.  "

## 2020-03-03 ENCOUNTER — OFFICE VISIT (OUTPATIENT)
Dept: CARDIOLOGY | Facility: MEDICAL CENTER | Age: 70
End: 2020-03-03
Payer: MEDICARE

## 2020-03-03 VITALS
HEIGHT: 75 IN | SYSTOLIC BLOOD PRESSURE: 114 MMHG | BODY MASS INDEX: 27.35 KG/M2 | DIASTOLIC BLOOD PRESSURE: 64 MMHG | WEIGHT: 220 LBS | HEART RATE: 42 BPM | OXYGEN SATURATION: 96 %

## 2020-03-03 DIAGNOSIS — R00.1 BRADYCARDIA, SINUS: ICD-10-CM

## 2020-03-03 DIAGNOSIS — E78.00 PURE HYPERCHOLESTEROLEMIA: ICD-10-CM

## 2020-03-03 DIAGNOSIS — Z82.49 FAMILY HISTORY OF ISCHEMIC HEART DISEASE (IHD): ICD-10-CM

## 2020-03-03 PROCEDURE — 99213 OFFICE O/P EST LOW 20 MIN: CPT | Performed by: INTERNAL MEDICINE

## 2020-03-03 RX ORDER — OMEGA-3S/DHA/EPA/FISH OIL 1000-1400
CAPSULE,DELAYED RELEASE (ENTERIC COATED) ORAL
COMMUNITY
End: 2020-05-08

## 2020-03-03 ASSESSMENT — ENCOUNTER SYMPTOMS
RESPIRATORY NEGATIVE: 1
CARDIOVASCULAR NEGATIVE: 1
CONSTITUTIONAL NEGATIVE: 1
MUSCULOSKELETAL NEGATIVE: 1
NEUROLOGICAL NEGATIVE: 1
GASTROINTESTINAL NEGATIVE: 1

## 2020-03-03 ASSESSMENT — FIBROSIS 4 INDEX: FIB4 SCORE: 1.7
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
## 2020-03-15 DIAGNOSIS — E03.9 HYPOTHYROIDISM (ACQUIRED): ICD-10-CM

## 2020-03-15 RX ORDER — LEVOTHYROXINE SODIUM 0.07 MG/1
75 TABLET ORAL
Qty: 90 TAB | Refills: 3 | Status: SHIPPED | OUTPATIENT
Start: 2020-03-15 | End: 2021-03-15

## 2020-04-29 ENCOUNTER — HOSPITAL ENCOUNTER (OUTPATIENT)
Facility: MEDICAL CENTER | Age: 70
End: 2020-04-29
Attending: INTERNAL MEDICINE
Payer: MEDICARE

## 2020-04-29 ENCOUNTER — NON-PROVIDER VISIT (OUTPATIENT)
Dept: INTERNAL MEDICINE | Facility: IMAGING CENTER | Age: 70
End: 2020-04-29
Payer: MEDICARE

## 2020-04-29 DIAGNOSIS — E53.8 B12 DEFICIENCY: ICD-10-CM

## 2020-04-29 DIAGNOSIS — Z01.89 ENCOUNTER FOR ROUTINE LABORATORY TESTING: ICD-10-CM

## 2020-04-29 DIAGNOSIS — E03.9 HYPOTHYROIDISM (ACQUIRED): ICD-10-CM

## 2020-04-29 DIAGNOSIS — E78.2 HYPERLIPIDEMIA, MIXED: ICD-10-CM

## 2020-04-29 DIAGNOSIS — I10 ESSENTIAL HYPERTENSION: ICD-10-CM

## 2020-04-29 DIAGNOSIS — Z79.899 LONG TERM USE OF DRUG: ICD-10-CM

## 2020-04-29 DIAGNOSIS — E55.9 VITAMIN D DEFICIENCY: ICD-10-CM

## 2020-04-29 LAB
25(OH)D3 SERPL-MCNC: 39 NG/ML (ref 30–100)
ALBUMIN SERPL BCP-MCNC: 4.3 G/DL (ref 3.2–4.9)
ALBUMIN/GLOB SERPL: 1.4 G/DL
ALP SERPL-CCNC: 46 U/L (ref 30–99)
ALT SERPL-CCNC: 17 U/L (ref 2–50)
ANION GAP SERPL CALC-SCNC: 7 MMOL/L (ref 7–16)
AST SERPL-CCNC: 22 U/L (ref 12–45)
BILIRUB SERPL-MCNC: 1.1 MG/DL (ref 0.1–1.5)
BUN SERPL-MCNC: 29 MG/DL (ref 8–22)
CALCIUM SERPL-MCNC: 8.7 MG/DL (ref 8.5–10.5)
CHLORIDE SERPL-SCNC: 99 MMOL/L (ref 96–112)
CK SERPL-CCNC: 187 U/L (ref 0–154)
CO2 SERPL-SCNC: 27 MMOL/L (ref 20–33)
CREAT SERPL-MCNC: 1.13 MG/DL (ref 0.5–1.4)
GLOBULIN SER CALC-MCNC: 3 G/DL (ref 1.9–3.5)
GLUCOSE SERPL-MCNC: 99 MG/DL (ref 65–99)
POTASSIUM SERPL-SCNC: 4.4 MMOL/L (ref 3.6–5.5)
PROT SERPL-MCNC: 7.3 G/DL (ref 6–8.2)
SODIUM SERPL-SCNC: 133 MMOL/L (ref 135–145)
T3 SERPL-MCNC: 97.9 NG/DL (ref 60–181)
TSH SERPL DL<=0.005 MIU/L-ACNC: 2.4 UIU/ML (ref 0.38–5.33)
VIT B12 SERPL-MCNC: 395 PG/ML (ref 211–911)

## 2020-04-29 PROCEDURE — 82306 VITAMIN D 25 HYDROXY: CPT

## 2020-04-29 PROCEDURE — 84443 ASSAY THYROID STIM HORMONE: CPT

## 2020-04-29 PROCEDURE — 80061 LIPID PANEL: CPT | Mod: XU

## 2020-04-29 PROCEDURE — 82607 VITAMIN B-12: CPT

## 2020-04-29 PROCEDURE — 83704 LIPOPROTEIN BLD QUAN PART: CPT

## 2020-04-29 PROCEDURE — 80053 COMPREHEN METABOLIC PANEL: CPT

## 2020-04-29 PROCEDURE — 82550 ASSAY OF CK (CPK): CPT

## 2020-04-29 PROCEDURE — 84480 ASSAY TRIIODOTHYRONINE (T3): CPT

## 2020-05-01 LAB
CHOLEST SERPL-MCNC: 161 MG/DL
HDL PARTICAL NO Q4363: 29.2 UMOL/L
HDL SIZE Q4361: 8.5 NM
HDLC SERPL-MCNC: 38 MG/DL (ref 40–59)
HLD.LARGE SERPL-SCNC: <2.8 UMOL/L
L VLDL PART NO Q4357: <1.5 NMOL/L
LDL SERPL QN: 20.5 NM
LDL SERPL-SCNC: 1284 NMOL/L
LDL SMALL SERPL-SCNC: 908 NMOL/L
LDLC SERPL CALC-MCNC: 99 MG/DL
PATHOLOGY STUDY: ABNORMAL
TRIGL SERPL-MCNC: 120 MG/DL (ref 30–149)
VLDL SIZE Q4362: 46.7 NM

## 2020-05-08 ENCOUNTER — OFFICE VISIT (OUTPATIENT)
Dept: INTERNAL MEDICINE | Facility: IMAGING CENTER | Age: 70
End: 2020-05-08
Payer: MEDICARE

## 2020-05-08 VITALS
BODY MASS INDEX: 26.86 KG/M2 | SYSTOLIC BLOOD PRESSURE: 116 MMHG | TEMPERATURE: 98.1 F | RESPIRATION RATE: 14 BRPM | OXYGEN SATURATION: 97 % | HEIGHT: 75 IN | WEIGHT: 216 LBS | DIASTOLIC BLOOD PRESSURE: 66 MMHG | HEART RATE: 40 BPM

## 2020-05-08 DIAGNOSIS — Z86.39 HISTORY OF IRON DEFICIENCY: ICD-10-CM

## 2020-05-08 DIAGNOSIS — E03.9 HYPOTHYROIDISM (ACQUIRED): ICD-10-CM

## 2020-05-08 DIAGNOSIS — Z86.39 HISTORY OF NON ANEMIC VITAMIN B12 DEFICIENCY: ICD-10-CM

## 2020-05-08 DIAGNOSIS — E78.2 HYPERLIPIDEMIA, MIXED: ICD-10-CM

## 2020-05-08 DIAGNOSIS — I10 ESSENTIAL HYPERTENSION: ICD-10-CM

## 2020-05-08 DIAGNOSIS — E55.9 VITAMIN D DEFICIENCY: ICD-10-CM

## 2020-05-08 DIAGNOSIS — Z79.899 LONG TERM USE OF DRUG: ICD-10-CM

## 2020-05-08 DIAGNOSIS — Z12.5 PROSTATE CANCER SCREENING: ICD-10-CM

## 2020-05-08 PROCEDURE — 99214 OFFICE O/P EST MOD 30 MIN: CPT | Performed by: INTERNAL MEDICINE

## 2020-05-08 RX ORDER — LORAZEPAM 0.5 MG/1
0.5 TABLET ORAL 2 TIMES DAILY PRN
COMMUNITY
Start: 2020-04-15 | End: 2021-12-06 | Stop reason: SDUPTHER

## 2020-05-08 ASSESSMENT — FIBROSIS 4 INDEX: FIB4 SCORE: 1.64

## 2020-05-08 ASSESSMENT — ENCOUNTER SYMPTOMS: DIZZINESS: 0
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
## 2020-09-21 ENCOUNTER — NON-PROVIDER VISIT (OUTPATIENT)
Dept: INTERNAL MEDICINE | Facility: IMAGING CENTER | Age: 70
End: 2020-09-21
Payer: MEDICARE

## 2020-09-21 ENCOUNTER — HOSPITAL ENCOUNTER (OUTPATIENT)
Facility: MEDICAL CENTER | Age: 70
End: 2020-09-21
Attending: INTERNAL MEDICINE
Payer: MEDICARE

## 2020-09-21 DIAGNOSIS — R30.0 DYSURIA: ICD-10-CM

## 2020-09-21 DIAGNOSIS — Z23 NEED FOR INFLUENZA VACCINATION: ICD-10-CM

## 2020-09-21 DIAGNOSIS — R35.0 URINARY FREQUENCY: ICD-10-CM

## 2020-09-21 LAB
APPEARANCE UR: NORMAL
BILIRUB UR STRIP-MCNC: NEGATIVE MG/DL
COLOR UR AUTO: NORMAL
GLUCOSE UR STRIP.AUTO-MCNC: NEGATIVE MG/DL
KETONES UR STRIP.AUTO-MCNC: NEGATIVE MG/DL
LEUKOCYTE ESTERASE UR QL STRIP.AUTO: NORMAL
NITRITE UR QL STRIP.AUTO: POSITIVE
PH UR STRIP.AUTO: 5.5 [PH] (ref 5–8)
PROT UR QL STRIP: NEGATIVE MG/DL
RBC UR QL AUTO: NORMAL
SP GR UR STRIP.AUTO: 1.02
UROBILINOGEN UR STRIP-MCNC: 0.2 MG/DL

## 2020-09-21 PROCEDURE — 87186 SC STD MICRODIL/AGAR DIL: CPT

## 2020-09-21 PROCEDURE — 87086 URINE CULTURE/COLONY COUNT: CPT

## 2020-09-21 PROCEDURE — 87077 CULTURE AEROBIC IDENTIFY: CPT

## 2020-09-21 PROCEDURE — 90662 IIV NO PRSV INCREASED AG IM: CPT | Performed by: INTERNAL MEDICINE

## 2020-09-21 PROCEDURE — G0008 ADMIN INFLUENZA VIRUS VAC: HCPCS | Performed by: INTERNAL MEDICINE

## 2020-09-21 PROCEDURE — 81002 URINALYSIS NONAUTO W/O SCOPE: CPT | Performed by: INTERNAL MEDICINE

## 2020-09-21 RX ORDER — SULFAMETHOXAZOLE AND TRIMETHOPRIM 800; 160 MG/1; MG/1
1 TABLET ORAL 2 TIMES DAILY
Qty: 14 TAB | Refills: 0 | Status: SHIPPED | OUTPATIENT
Start: 2020-09-21 | End: 2020-09-28

## 2020-09-22 DIAGNOSIS — R30.0 DYSURIA: ICD-10-CM

## 2020-09-22 DIAGNOSIS — R35.0 URINARY FREQUENCY: ICD-10-CM

## 2020-09-24 LAB
BACTERIA UR CULT: ABNORMAL
BACTERIA UR CULT: ABNORMAL
SIGNIFICANT IND 70042: ABNORMAL
SITE SITE: ABNORMAL
SOURCE SOURCE: ABNORMAL

## 2020-10-05 ENCOUNTER — NON-PROVIDER VISIT (OUTPATIENT)
Dept: INTERNAL MEDICINE | Facility: IMAGING CENTER | Age: 70
End: 2020-10-05
Payer: MEDICARE

## 2020-10-05 DIAGNOSIS — Z01.89 ENCOUNTER FOR ROUTINE LABORATORY TESTING: ICD-10-CM

## 2020-10-19 ENCOUNTER — HOSPITAL ENCOUNTER (OUTPATIENT)
Facility: MEDICAL CENTER | Age: 70
End: 2020-10-19
Attending: OTOLARYNGOLOGY
Payer: MEDICARE

## 2020-10-19 PROCEDURE — 87070 CULTURE OTHR SPECIMN AEROBIC: CPT

## 2020-10-19 PROCEDURE — 87075 CULTR BACTERIA EXCEPT BLOOD: CPT

## 2020-10-19 PROCEDURE — 87205 SMEAR GRAM STAIN: CPT

## 2020-10-20 LAB
GRAM STN SPEC: NORMAL
SIGNIFICANT IND 70042: NORMAL
SITE SITE: NORMAL
SOURCE SOURCE: NORMAL

## 2020-10-21 DIAGNOSIS — N40.1 BENIGN NON-NODULAR PROSTATIC HYPERPLASIA WITH LOWER URINARY TRACT SYMPTOMS: ICD-10-CM

## 2020-10-21 LAB
BACTERIA WND AEROBE CULT: NORMAL
GRAM STN SPEC: NORMAL
SIGNIFICANT IND 70042: NORMAL
SITE SITE: NORMAL
SOURCE SOURCE: NORMAL

## 2020-10-21 RX ORDER — TADALAFIL 5 MG/1
5 TABLET ORAL DAILY
Qty: 90 TAB | Refills: 3 | Status: SHIPPED | OUTPATIENT
Start: 2020-10-21 | End: 2021-08-27 | Stop reason: SDUPTHER

## 2020-10-22 LAB
BACTERIA SPEC ANAEROBE CULT: NORMAL
SIGNIFICANT IND 70042: NORMAL
SITE SITE: NORMAL
SOURCE SOURCE: NORMAL

## 2020-11-04 DIAGNOSIS — E78.2 MIXED HYPERLIPIDEMIA WITH APOLIPOPROTEIN E3 VARIANT: ICD-10-CM

## 2020-11-04 RX ORDER — EZETIMIBE 10 MG/1
TABLET ORAL
Qty: 90 TAB | Refills: 3 | Status: SHIPPED | OUTPATIENT
Start: 2020-11-04 | End: 2021-10-25

## 2020-11-06 ENCOUNTER — HOSPITAL ENCOUNTER (OUTPATIENT)
Facility: MEDICAL CENTER | Age: 70
End: 2020-11-06
Attending: INTERNAL MEDICINE
Payer: MEDICARE

## 2020-11-06 ENCOUNTER — NON-PROVIDER VISIT (OUTPATIENT)
Dept: INTERNAL MEDICINE | Facility: IMAGING CENTER | Age: 70
End: 2020-11-06
Payer: MEDICARE

## 2020-11-06 DIAGNOSIS — Z01.89 ENCOUNTER FOR ROUTINE LABORATORY TESTING: ICD-10-CM

## 2020-11-06 PROCEDURE — 80061 LIPID PANEL: CPT | Mod: XU

## 2020-11-06 PROCEDURE — 84153 ASSAY OF PSA TOTAL: CPT | Mod: GA

## 2020-11-06 PROCEDURE — 82306 VITAMIN D 25 HYDROXY: CPT

## 2020-11-06 PROCEDURE — 84480 ASSAY TRIIODOTHYRONINE (T3): CPT

## 2020-11-06 PROCEDURE — 83540 ASSAY OF IRON: CPT

## 2020-11-06 PROCEDURE — 81003 URINALYSIS AUTO W/O SCOPE: CPT

## 2020-11-06 PROCEDURE — 83550 IRON BINDING TEST: CPT

## 2020-11-06 PROCEDURE — 82550 ASSAY OF CK (CPK): CPT

## 2020-11-06 PROCEDURE — 83704 LIPOPROTEIN BLD QUAN PART: CPT

## 2020-11-06 PROCEDURE — 85025 COMPLETE CBC W/AUTO DIFF WBC: CPT

## 2020-11-06 PROCEDURE — 80053 COMPREHEN METABOLIC PANEL: CPT

## 2020-11-06 PROCEDURE — 84443 ASSAY THYROID STIM HORMONE: CPT

## 2020-11-06 PROCEDURE — 82607 VITAMIN B-12: CPT

## 2020-11-07 DIAGNOSIS — Z12.5 PROSTATE CANCER SCREENING: ICD-10-CM

## 2020-11-07 DIAGNOSIS — Z79.899 LONG TERM USE OF DRUG: ICD-10-CM

## 2020-11-07 DIAGNOSIS — Z86.39 HISTORY OF NON ANEMIC VITAMIN B12 DEFICIENCY: ICD-10-CM

## 2020-11-07 DIAGNOSIS — E03.9 HYPOTHYROIDISM (ACQUIRED): ICD-10-CM

## 2020-11-07 DIAGNOSIS — I10 ESSENTIAL HYPERTENSION: ICD-10-CM

## 2020-11-07 DIAGNOSIS — E78.2 HYPERLIPIDEMIA, MIXED: ICD-10-CM

## 2020-11-07 DIAGNOSIS — Z86.39 HISTORY OF IRON DEFICIENCY: ICD-10-CM

## 2020-11-07 DIAGNOSIS — E55.9 VITAMIN D DEFICIENCY: ICD-10-CM

## 2020-11-07 LAB
25(OH)D3 SERPL-MCNC: 56 NG/ML (ref 30–100)
ALBUMIN SERPL BCP-MCNC: 4.1 G/DL (ref 3.2–4.9)
ALBUMIN/GLOB SERPL: 1.6 G/DL
ALP SERPL-CCNC: 44 U/L (ref 30–99)
ALT SERPL-CCNC: 19 U/L (ref 2–50)
ANION GAP SERPL CALC-SCNC: 6 MMOL/L (ref 7–16)
APPEARANCE UR: CLEAR
AST SERPL-CCNC: 21 U/L (ref 12–45)
BASOPHILS # BLD AUTO: 0.8 % (ref 0–1.8)
BASOPHILS # BLD: 0.04 K/UL (ref 0–0.12)
BILIRUB SERPL-MCNC: 0.6 MG/DL (ref 0.1–1.5)
BILIRUB UR QL STRIP.AUTO: NEGATIVE
BUN SERPL-MCNC: 22 MG/DL (ref 8–22)
CALCIUM SERPL-MCNC: 8.6 MG/DL (ref 8.5–10.5)
CHLORIDE SERPL-SCNC: 104 MMOL/L (ref 96–112)
CK SERPL-CCNC: 108 U/L (ref 0–154)
CO2 SERPL-SCNC: 27 MMOL/L (ref 20–33)
COLOR UR: YELLOW
CREAT SERPL-MCNC: 1.06 MG/DL (ref 0.5–1.4)
EOSINOPHIL # BLD AUTO: 0.22 K/UL (ref 0–0.51)
EOSINOPHIL NFR BLD: 4.3 % (ref 0–6.9)
ERYTHROCYTE [DISTWIDTH] IN BLOOD BY AUTOMATED COUNT: 48.6 FL (ref 35.9–50)
GLOBULIN SER CALC-MCNC: 2.6 G/DL (ref 1.9–3.5)
GLUCOSE SERPL-MCNC: 84 MG/DL (ref 65–99)
GLUCOSE UR STRIP.AUTO-MCNC: NEGATIVE MG/DL
HCT VFR BLD AUTO: 47.5 % (ref 42–52)
HGB BLD-MCNC: 15 G/DL (ref 14–18)
IMM GRANULOCYTES # BLD AUTO: 0.01 K/UL (ref 0–0.11)
IMM GRANULOCYTES NFR BLD AUTO: 0.2 % (ref 0–0.9)
IRON SATN MFR SERPL: 22 % (ref 15–55)
IRON SERPL-MCNC: 70 UG/DL (ref 50–180)
KETONES UR STRIP.AUTO-MCNC: NEGATIVE MG/DL
LEUKOCYTE ESTERASE UR QL STRIP.AUTO: NEGATIVE
LYMPHOCYTES # BLD AUTO: 1.69 K/UL (ref 1–4.8)
LYMPHOCYTES NFR BLD: 33.1 % (ref 22–41)
MCH RBC QN AUTO: 31.9 PG (ref 27–33)
MCHC RBC AUTO-ENTMCNC: 31.6 G/DL (ref 33.7–35.3)
MCV RBC AUTO: 101.1 FL (ref 81.4–97.8)
MICRO URNS: NORMAL
MONOCYTES # BLD AUTO: 0.4 K/UL (ref 0–0.85)
MONOCYTES NFR BLD AUTO: 7.8 % (ref 0–13.4)
NEUTROPHILS # BLD AUTO: 2.75 K/UL (ref 1.82–7.42)
NEUTROPHILS NFR BLD: 53.8 % (ref 44–72)
NITRITE UR QL STRIP.AUTO: NEGATIVE
NRBC # BLD AUTO: 0 K/UL
NRBC BLD-RTO: 0 /100 WBC
PH UR STRIP.AUTO: 7 [PH] (ref 5–8)
PLATELET # BLD AUTO: 194 K/UL (ref 164–446)
PMV BLD AUTO: 11.4 FL (ref 9–12.9)
POTASSIUM SERPL-SCNC: 4.6 MMOL/L (ref 3.6–5.5)
PROT SERPL-MCNC: 6.7 G/DL (ref 6–8.2)
PROT UR QL STRIP: NEGATIVE MG/DL
PSA SERPL-MCNC: 1.94 NG/ML (ref 0–4)
RBC # BLD AUTO: 4.7 M/UL (ref 4.7–6.1)
RBC UR QL AUTO: NEGATIVE
SODIUM SERPL-SCNC: 137 MMOL/L (ref 135–145)
SP GR UR STRIP.AUTO: 1.02
T3 SERPL-MCNC: 82.2 NG/DL (ref 60–181)
TIBC SERPL-MCNC: 316 UG/DL (ref 250–450)
TSH SERPL DL<=0.005 MIU/L-ACNC: 2.13 UIU/ML (ref 0.38–5.33)
UIBC SERPL-MCNC: 246 UG/DL (ref 110–370)
UROBILINOGEN UR STRIP.AUTO-MCNC: 0.2 MG/DL
VIT B12 SERPL-MCNC: 446 PG/ML (ref 211–911)
WBC # BLD AUTO: 5.1 K/UL (ref 4.8–10.8)

## 2020-11-11 LAB
CHOLEST SERPL-MCNC: 130 MG/DL
HDL PARTICAL NO Q4363: 31.6 UMOL/L
HDL SIZE Q4361: 8.6 NM
HDLC SERPL-MCNC: 42 MG/DL (ref 40–59)
HLD.LARGE SERPL-SCNC: <2.8 UMOL/L
L VLDL PART NO Q4357: <1.5 NMOL/L
LDL SERPL QN: 20.4 NM
LDL SERPL-SCNC: 1235 NMOL/L
LDL SMALL SERPL-SCNC: 727 NMOL/L
LDLC SERPL CALC-MCNC: 72 MG/DL
PATHOLOGY STUDY: ABNORMAL
TRIGL SERPL-MCNC: 79 MG/DL (ref 30–149)
VLDL SIZE Q4362: 45.6 NM

## 2020-11-16 ENCOUNTER — OFFICE VISIT (OUTPATIENT)
Dept: INTERNAL MEDICINE | Facility: IMAGING CENTER | Age: 70
End: 2020-11-16
Payer: MEDICARE

## 2020-11-16 VITALS
HEART RATE: 58 BPM | SYSTOLIC BLOOD PRESSURE: 122 MMHG | HEIGHT: 75 IN | DIASTOLIC BLOOD PRESSURE: 68 MMHG | TEMPERATURE: 98.9 F | BODY MASS INDEX: 27.48 KG/M2 | RESPIRATION RATE: 14 BRPM | WEIGHT: 221 LBS | OXYGEN SATURATION: 95 %

## 2020-11-16 DIAGNOSIS — Z86.39 HISTORY OF VITAMIN D DEFICIENCY: ICD-10-CM

## 2020-11-16 DIAGNOSIS — Z79.899 LONG TERM USE OF DRUG: ICD-10-CM

## 2020-11-16 DIAGNOSIS — E03.9 HYPOTHYROIDISM (ACQUIRED): ICD-10-CM

## 2020-11-16 DIAGNOSIS — E78.2 MIXED HYPERLIPIDEMIA WITH APOLIPOPROTEIN E3 VARIANT: ICD-10-CM

## 2020-11-16 DIAGNOSIS — Z86.39 HISTORY OF NON ANEMIC VITAMIN B12 DEFICIENCY: ICD-10-CM

## 2020-11-16 DIAGNOSIS — Z86.39 HISTORY OF IRON DEFICIENCY: ICD-10-CM

## 2020-11-16 PROBLEM — R94.39 ABNORMAL STRESS ELECTROCARDIOGRAM TEST USING TREADMILL: Status: RESOLVED | Noted: 2018-06-29 | Resolved: 2020-11-16

## 2020-11-16 PROCEDURE — 99214 OFFICE O/P EST MOD 30 MIN: CPT | Performed by: INTERNAL MEDICINE

## 2020-11-16 ASSESSMENT — FIBROSIS 4 INDEX: FIB4 SCORE: 1.69
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
## 2020-12-29 RX ORDER — FENOFIBRATE 145 MG/1
145 TABLET, COATED ORAL DAILY
Qty: 90 TAB | Refills: 3 | Status: SHIPPED | OUTPATIENT
Start: 2020-12-29 | End: 2021-05-24 | Stop reason: CLARIF

## 2021-01-28 ENCOUNTER — HOSPITAL ENCOUNTER (OUTPATIENT)
Dept: LAB | Facility: MEDICAL CENTER | Age: 71
End: 2021-01-28
Attending: INTERNAL MEDICINE
Payer: MEDICARE

## 2021-01-28 DIAGNOSIS — Z20.822 CLOSE EXPOSURE TO COVID-19 VIRUS: ICD-10-CM

## 2021-01-28 LAB — COVID ORDER STATUS COVID19: NORMAL

## 2021-01-28 PROCEDURE — U0005 INFEC AGEN DETEC AMPLI PROBE: HCPCS

## 2021-01-28 PROCEDURE — U0003 INFECTIOUS AGENT DETECTION BY NUCLEIC ACID (DNA OR RNA); SEVERE ACUTE RESPIRATORY SYNDROME CORONAVIRUS 2 (SARS-COV-2) (CORONAVIRUS DISEASE [COVID-19]), AMPLIFIED PROBE TECHNIQUE, MAKING USE OF HIGH THROUGHPUT TECHNOLOGIES AS DESCRIBED BY CMS-2020-01-R: HCPCS

## 2021-01-28 PROCEDURE — C9803 HOPD COVID-19 SPEC COLLECT: HCPCS

## 2021-01-29 LAB
SARS-COV-2 RNA RESP QL NAA+PROBE: NOTDETECTED
SPECIMEN SOURCE: NORMAL

## 2021-03-03 DIAGNOSIS — Z23 NEED FOR VACCINATION: ICD-10-CM

## 2021-03-15 DIAGNOSIS — E03.9 HYPOTHYROIDISM (ACQUIRED): ICD-10-CM

## 2021-03-15 RX ORDER — LEVOTHYROXINE SODIUM 75 MCG
TABLET ORAL
Qty: 90 TABLET | Refills: 3 | Status: SHIPPED | OUTPATIENT
Start: 2021-03-15 | End: 2022-03-14

## 2021-03-26 ENCOUNTER — APPOINTMENT (OUTPATIENT)
Dept: INTERNAL MEDICINE | Facility: IMAGING CENTER | Age: 71
End: 2021-03-26
Payer: MEDICARE

## 2021-03-26 ENCOUNTER — OFFICE VISIT (OUTPATIENT)
Dept: INTERNAL MEDICINE | Facility: IMAGING CENTER | Age: 71
End: 2021-03-26
Payer: MEDICARE

## 2021-03-26 VITALS
HEART RATE: 53 BPM | HEIGHT: 75 IN | SYSTOLIC BLOOD PRESSURE: 126 MMHG | WEIGHT: 220 LBS | BODY MASS INDEX: 27.35 KG/M2 | OXYGEN SATURATION: 97 % | RESPIRATION RATE: 14 BRPM | DIASTOLIC BLOOD PRESSURE: 60 MMHG | TEMPERATURE: 98.8 F

## 2021-03-26 DIAGNOSIS — M75.51 BURSITIS OF RIGHT SHOULDER: ICD-10-CM

## 2021-03-26 PROCEDURE — 20610 DRAIN/INJ JOINT/BURSA W/O US: CPT | Performed by: INTERNAL MEDICINE

## 2021-03-26 RX ORDER — TRIAMCINOLONE ACETONIDE 40 MG/ML
40 INJECTION, SUSPENSION INTRA-ARTICULAR; INTRAMUSCULAR ONCE
Status: COMPLETED | OUTPATIENT
Start: 2021-03-26 | End: 2021-03-26

## 2021-03-26 RX ADMIN — TRIAMCINOLONE ACETONIDE 40 MG: 40 INJECTION, SUSPENSION INTRA-ARTICULAR; INTRAMUSCULAR at 15:20

## 2021-03-26 ASSESSMENT — FIBROSIS 4 INDEX: FIB4 SCORE: 1.69
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
## 2021-04-19 ENCOUNTER — OFFICE VISIT (OUTPATIENT)
Dept: INTERNAL MEDICINE | Facility: IMAGING CENTER | Age: 71
End: 2021-04-19
Payer: MEDICARE

## 2021-04-19 VITALS
WEIGHT: 220 LBS | OXYGEN SATURATION: 97 % | SYSTOLIC BLOOD PRESSURE: 120 MMHG | BODY MASS INDEX: 27.35 KG/M2 | RESPIRATION RATE: 14 BRPM | DIASTOLIC BLOOD PRESSURE: 66 MMHG | TEMPERATURE: 99.2 F | HEART RATE: 68 BPM | HEIGHT: 75 IN

## 2021-04-19 DIAGNOSIS — M94.0 COSTOCHONDRITIS, ACUTE: ICD-10-CM

## 2021-04-19 PROCEDURE — 99213 OFFICE O/P EST LOW 20 MIN: CPT | Performed by: INTERNAL MEDICINE

## 2021-04-19 RX ORDER — CARISOPRODOL 350 MG/1
350 TABLET ORAL EVERY 8 HOURS PRN
Qty: 30 TABLET | Refills: 0 | Status: SHIPPED | OUTPATIENT
Start: 2021-04-19 | End: 2021-04-29

## 2021-04-19 ASSESSMENT — FIBROSIS 4 INDEX: FIB4 SCORE: 1.69
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
## 2021-05-07 ENCOUNTER — PATIENT MESSAGE (OUTPATIENT)
Dept: INTERNAL MEDICINE | Facility: IMAGING CENTER | Age: 71
End: 2021-05-07

## 2021-05-07 DIAGNOSIS — M75.51 BURSITIS OF RIGHT SHOULDER: ICD-10-CM

## 2021-05-07 DIAGNOSIS — M25.511 CHRONIC RIGHT SHOULDER PAIN: ICD-10-CM

## 2021-05-07 DIAGNOSIS — G89.29 CHRONIC RIGHT SHOULDER PAIN: ICD-10-CM

## 2021-05-07 NOTE — TELEPHONE ENCOUNTER
----- Message from Checo Hughes sent at 5/7/2021  6:17 AM PDT -----  Regarding: Non-Urgent Medical Question  Contact: [phone]  Hi Sabi, I was not going to pursue an MRI until again last night I could not get any sleep because of the pain in my shoulder when I lie down. I am OK when I am up and about for the most part.    Let me know what the next step is.  Thanks  Emre

## 2021-05-10 ENCOUNTER — HOSPITAL ENCOUNTER (OUTPATIENT)
Facility: MEDICAL CENTER | Age: 71
End: 2021-05-10
Attending: INTERNAL MEDICINE
Payer: MEDICARE

## 2021-05-10 ENCOUNTER — NON-PROVIDER VISIT (OUTPATIENT)
Dept: INTERNAL MEDICINE | Facility: IMAGING CENTER | Age: 71
End: 2021-05-10
Payer: MEDICARE

## 2021-05-10 DIAGNOSIS — Z01.89 ENCOUNTER FOR ROUTINE LABORATORY TESTING: ICD-10-CM

## 2021-05-10 DIAGNOSIS — Z86.39 HISTORY OF NON ANEMIC VITAMIN B12 DEFICIENCY: ICD-10-CM

## 2021-05-10 DIAGNOSIS — E78.2 MIXED HYPERLIPIDEMIA WITH APOLIPOPROTEIN E3 VARIANT: ICD-10-CM

## 2021-05-10 DIAGNOSIS — Z86.39 HISTORY OF IRON DEFICIENCY: ICD-10-CM

## 2021-05-10 DIAGNOSIS — Z79.899 LONG TERM USE OF DRUG: ICD-10-CM

## 2021-05-10 LAB
ALBUMIN SERPL BCP-MCNC: 4.3 G/DL (ref 3.2–4.9)
ALBUMIN/GLOB SERPL: 1.5 G/DL
ALP SERPL-CCNC: 50 U/L (ref 30–99)
ALT SERPL-CCNC: 20 U/L (ref 2–50)
ANION GAP SERPL CALC-SCNC: 10 MMOL/L (ref 7–16)
AST SERPL-CCNC: 21 U/L (ref 12–45)
BILIRUB SERPL-MCNC: 0.9 MG/DL (ref 0.1–1.5)
BUN SERPL-MCNC: 29 MG/DL (ref 8–22)
CALCIUM SERPL-MCNC: 9.2 MG/DL (ref 8.5–10.5)
CHLORIDE SERPL-SCNC: 104 MMOL/L (ref 96–112)
CO2 SERPL-SCNC: 25 MMOL/L (ref 20–33)
CREAT SERPL-MCNC: 1.01 MG/DL (ref 0.5–1.4)
FERRITIN SERPL-MCNC: 48.8 NG/ML (ref 22–322)
GLOBULIN SER CALC-MCNC: 2.8 G/DL (ref 1.9–3.5)
GLUCOSE SERPL-MCNC: 96 MG/DL (ref 65–99)
IRON SATN MFR SERPL: 48 % (ref 15–55)
IRON SERPL-MCNC: 155 UG/DL (ref 50–180)
POTASSIUM SERPL-SCNC: 4.5 MMOL/L (ref 3.6–5.5)
PROT SERPL-MCNC: 7.1 G/DL (ref 6–8.2)
SODIUM SERPL-SCNC: 139 MMOL/L (ref 135–145)
TIBC SERPL-MCNC: 321 UG/DL (ref 250–450)
UIBC SERPL-MCNC: 166 UG/DL (ref 110–370)
VIT B12 SERPL-MCNC: 592 PG/ML (ref 211–911)

## 2021-05-10 PROCEDURE — 83540 ASSAY OF IRON: CPT

## 2021-05-10 PROCEDURE — 82728 ASSAY OF FERRITIN: CPT

## 2021-05-10 PROCEDURE — 80053 COMPREHEN METABOLIC PANEL: CPT

## 2021-05-10 PROCEDURE — 83704 LIPOPROTEIN BLD QUAN PART: CPT

## 2021-05-10 PROCEDURE — 83550 IRON BINDING TEST: CPT

## 2021-05-10 PROCEDURE — 80061 LIPID PANEL: CPT

## 2021-05-10 PROCEDURE — 82607 VITAMIN B-12: CPT

## 2021-05-15 LAB
CHOLEST SERPL-MCNC: 153 MG/DL
HDL PARTICAL NO Q4363: 35.6 UMOL/L
HDL SIZE Q4361: 8.5 NM
HDLC SERPL-MCNC: 44 MG/DL (ref 40–59)
HLD.LARGE SERPL-SCNC: <2.8 UMOL/L
L VLDL PART NO Q4357: ABNORMAL NMOL/L
LDL SERPL QN: 20.4 NM
LDL SERPL-SCNC: 1351 NMOL/L
LDL SMALL SERPL-SCNC: 832 NMOL/L
LDLC SERPL CALC-MCNC: 89 MG/DL
PATHOLOGY STUDY: ABNORMAL
TRIGL SERPL-MCNC: 98 MG/DL (ref 30–149)
VLDL SIZE Q4362: 46.7 NM

## 2021-05-17 ENCOUNTER — NON-PROVIDER VISIT (OUTPATIENT)
Dept: INTERNAL MEDICINE | Facility: IMAGING CENTER | Age: 71
End: 2021-05-17
Payer: MEDICARE

## 2021-05-17 DIAGNOSIS — E78.2 HYPERLIPIDEMIA, MIXED: ICD-10-CM

## 2021-05-17 DIAGNOSIS — Z23 NEED FOR TDAP VACCINATION: ICD-10-CM

## 2021-05-17 PROCEDURE — 90471 IMMUNIZATION ADMIN: CPT | Performed by: INTERNAL MEDICINE

## 2021-05-17 PROCEDURE — 90715 TDAP VACCINE 7 YRS/> IM: CPT | Performed by: INTERNAL MEDICINE

## 2021-05-17 RX ORDER — ROSUVASTATIN CALCIUM 10 MG/1
5 TABLET, COATED ORAL
Qty: 45 TABLET | Refills: 3 | Status: SHIPPED | OUTPATIENT
Start: 2021-05-17 | End: 2022-04-11

## 2021-05-23 ENCOUNTER — HOSPITAL ENCOUNTER (OUTPATIENT)
Dept: RADIOLOGY | Facility: MEDICAL CENTER | Age: 71
End: 2021-05-23
Attending: INTERNAL MEDICINE
Payer: MEDICARE

## 2021-05-23 DIAGNOSIS — G89.29 CHRONIC RIGHT SHOULDER PAIN: ICD-10-CM

## 2021-05-23 DIAGNOSIS — M75.51 BURSITIS OF RIGHT SHOULDER: ICD-10-CM

## 2021-05-23 DIAGNOSIS — M25.511 CHRONIC RIGHT SHOULDER PAIN: ICD-10-CM

## 2021-05-23 PROCEDURE — 73221 MRI JOINT UPR EXTREM W/O DYE: CPT | Mod: RT,ME

## 2021-05-24 ENCOUNTER — OFFICE VISIT (OUTPATIENT)
Dept: INTERNAL MEDICINE | Facility: IMAGING CENTER | Age: 71
End: 2021-05-24
Payer: MEDICARE

## 2021-05-24 VITALS
WEIGHT: 215 LBS | HEIGHT: 75 IN | SYSTOLIC BLOOD PRESSURE: 122 MMHG | DIASTOLIC BLOOD PRESSURE: 70 MMHG | OXYGEN SATURATION: 96 % | HEART RATE: 57 BPM | RESPIRATION RATE: 14 BRPM | TEMPERATURE: 98.8 F | BODY MASS INDEX: 26.73 KG/M2

## 2021-05-24 DIAGNOSIS — Z79.899 LONG TERM USE OF DRUG: ICD-10-CM

## 2021-05-24 DIAGNOSIS — K57.30 DIVERTICULOSIS OF COLON: ICD-10-CM

## 2021-05-24 DIAGNOSIS — Z12.5 PROSTATE CANCER SCREENING: ICD-10-CM

## 2021-05-24 DIAGNOSIS — Z86.39 HISTORY OF VITAMIN D DEFICIENCY: ICD-10-CM

## 2021-05-24 DIAGNOSIS — S46.011A TRAUMATIC TEAR OF RIGHT ROTATOR CUFF, UNSPECIFIED TEAR EXTENT, INITIAL ENCOUNTER: ICD-10-CM

## 2021-05-24 DIAGNOSIS — Z86.39 HISTORY OF NON ANEMIC VITAMIN B12 DEFICIENCY: ICD-10-CM

## 2021-05-24 DIAGNOSIS — E03.9 HYPOTHYROIDISM (ACQUIRED): ICD-10-CM

## 2021-05-24 DIAGNOSIS — E53.8 B12 DEFICIENCY: ICD-10-CM

## 2021-05-24 DIAGNOSIS — Z86.39 HISTORY OF IRON DEFICIENCY: ICD-10-CM

## 2021-05-24 DIAGNOSIS — Z00.00 MEDICARE ANNUAL WELLNESS VISIT, SUBSEQUENT: ICD-10-CM

## 2021-05-24 DIAGNOSIS — E78.2 MIXED HYPERLIPIDEMIA WITH APOLIPOPROTEIN E3 VARIANT: ICD-10-CM

## 2021-05-24 PROBLEM — R94.31 ABNORMAL ECG DURING EXERCISE STRESS TEST: Status: RESOLVED | Noted: 2018-05-29 | Resolved: 2021-05-24

## 2021-05-24 PROCEDURE — G0439 PPPS, SUBSEQ VISIT: HCPCS | Performed by: INTERNAL MEDICINE

## 2021-05-24 RX ORDER — LANOLIN ALCOHOL/MO/W.PET/CERES
325 CREAM (GRAM) TOPICAL
Qty: 100 TABLET | Refills: 3 | COMMUNITY
Start: 2021-05-24 | End: 2021-12-06

## 2021-05-24 ASSESSMENT — ACTIVITIES OF DAILY LIVING (ADL): BATHING_REQUIRES_ASSISTANCE: 0

## 2021-05-24 ASSESSMENT — PATIENT HEALTH QUESTIONNAIRE - PHQ9: CLINICAL INTERPRETATION OF PHQ2 SCORE: 0

## 2021-05-24 ASSESSMENT — FIBROSIS 4 INDEX: FIB4 SCORE: 1.67
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
## 2021-07-14 ENCOUNTER — OFFICE VISIT (OUTPATIENT)
Dept: INTERNAL MEDICINE | Facility: IMAGING CENTER | Age: 71
End: 2021-07-14
Payer: MEDICARE

## 2021-07-14 ENCOUNTER — HOSPITAL ENCOUNTER (OUTPATIENT)
Facility: MEDICAL CENTER | Age: 71
End: 2021-07-14
Attending: INTERNAL MEDICINE
Payer: MEDICARE

## 2021-07-14 VITALS
DIASTOLIC BLOOD PRESSURE: 66 MMHG | BODY MASS INDEX: 26.73 KG/M2 | HEIGHT: 75 IN | TEMPERATURE: 98.9 F | HEART RATE: 44 BPM | SYSTOLIC BLOOD PRESSURE: 118 MMHG | WEIGHT: 215 LBS | RESPIRATION RATE: 14 BRPM | OXYGEN SATURATION: 96 %

## 2021-07-14 DIAGNOSIS — R22.9 LUMP OF SKIN: ICD-10-CM

## 2021-07-14 DIAGNOSIS — N30.00 ACUTE CYSTITIS WITHOUT HEMATURIA: ICD-10-CM

## 2021-07-14 DIAGNOSIS — M72.0 DUPUYTREN'S DISEASE OF PALM OF LEFT HAND: ICD-10-CM

## 2021-07-14 LAB
APPEARANCE UR: NORMAL
BILIRUB UR STRIP-MCNC: NEGATIVE MG/DL
COLOR UR AUTO: YELLOW
GLUCOSE UR STRIP.AUTO-MCNC: NEGATIVE MG/DL
KETONES UR STRIP.AUTO-MCNC: NEGATIVE MG/DL
LEUKOCYTE ESTERASE UR QL STRIP.AUTO: NORMAL
NITRITE UR QL STRIP.AUTO: POSITIVE
PH UR STRIP.AUTO: 6.5 [PH] (ref 5–8)
PROT UR QL STRIP: NEGATIVE MG/DL
RBC UR QL AUTO: NORMAL
SP GR UR STRIP.AUTO: 1.02
UROBILINOGEN UR STRIP-MCNC: 0.2 MG/DL

## 2021-07-14 PROCEDURE — 81002 URINALYSIS NONAUTO W/O SCOPE: CPT | Performed by: INTERNAL MEDICINE

## 2021-07-14 PROCEDURE — 99213 OFFICE O/P EST LOW 20 MIN: CPT | Performed by: INTERNAL MEDICINE

## 2021-07-14 PROCEDURE — 87086 URINE CULTURE/COLONY COUNT: CPT

## 2021-07-14 PROCEDURE — 87077 CULTURE AEROBIC IDENTIFY: CPT

## 2021-07-14 RX ORDER — SULFAMETHOXAZOLE AND TRIMETHOPRIM 800; 160 MG/1; MG/1
1 TABLET ORAL 2 TIMES DAILY
Qty: 14 TABLET | Refills: 0 | Status: SHIPPED | OUTPATIENT
Start: 2021-07-14 | End: 2021-07-21

## 2021-07-14 ASSESSMENT — FIBROSIS 4 INDEX: FIB4 SCORE: 1.67
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
## 2021-07-29 ENCOUNTER — NON-PROVIDER VISIT (OUTPATIENT)
Dept: INTERNAL MEDICINE | Facility: IMAGING CENTER | Age: 71
End: 2021-07-29
Payer: MEDICARE

## 2021-07-29 ENCOUNTER — HOSPITAL ENCOUNTER (OUTPATIENT)
Facility: MEDICAL CENTER | Age: 71
End: 2021-07-29
Attending: INTERNAL MEDICINE
Payer: MEDICARE

## 2021-07-29 DIAGNOSIS — R82.90 ABNORMAL URINALYSIS: ICD-10-CM

## 2021-07-29 LAB
APPEARANCE UR: CLEAR
BACTERIA #/AREA URNS HPF: ABNORMAL /HPF
BILIRUB UR QL STRIP.AUTO: NEGATIVE
COLOR UR: ABNORMAL
EPI CELLS #/AREA URNS HPF: NEGATIVE /HPF
GLUCOSE UR STRIP.AUTO-MCNC: NEGATIVE MG/DL
HYALINE CASTS #/AREA URNS LPF: ABNORMAL /LPF
KETONES UR STRIP.AUTO-MCNC: ABNORMAL MG/DL
LEUKOCYTE ESTERASE UR QL STRIP.AUTO: NEGATIVE
MICRO URNS: ABNORMAL
NITRITE UR QL STRIP.AUTO: POSITIVE
PH UR STRIP.AUTO: 5.5 [PH] (ref 5–8)
PROT UR QL STRIP: NEGATIVE MG/DL
RBC # URNS HPF: ABNORMAL /HPF
RBC UR QL AUTO: ABNORMAL
SP GR UR STRIP.AUTO: 1.02
UROBILINOGEN UR STRIP.AUTO-MCNC: 0.2 MG/DL
WBC #/AREA URNS HPF: ABNORMAL /HPF

## 2021-07-29 PROCEDURE — 87086 URINE CULTURE/COLONY COUNT: CPT

## 2021-07-29 PROCEDURE — 81001 URINALYSIS AUTO W/SCOPE: CPT

## 2021-07-30 RX ORDER — AMOXICILLIN 500 MG/1
500 CAPSULE ORAL 3 TIMES DAILY
Qty: 42 CAPSULE | Refills: 0 | Status: SHIPPED | OUTPATIENT
Start: 2021-07-30 | End: 2021-08-13

## 2021-08-01 LAB
BACTERIA UR CULT: NORMAL
SIGNIFICANT IND 70042: NORMAL
SITE SITE: NORMAL
SOURCE SOURCE: NORMAL

## 2021-08-03 DIAGNOSIS — R82.90 ABNORMAL URINALYSIS: ICD-10-CM

## 2021-08-18 ENCOUNTER — HOSPITAL ENCOUNTER (OUTPATIENT)
Facility: MEDICAL CENTER | Age: 71
End: 2021-08-18
Attending: INTERNAL MEDICINE
Payer: MEDICARE

## 2021-08-18 ENCOUNTER — NON-PROVIDER VISIT (OUTPATIENT)
Dept: INTERNAL MEDICINE | Facility: IMAGING CENTER | Age: 71
End: 2021-08-18
Payer: MEDICARE

## 2021-08-18 DIAGNOSIS — R82.90 ABNORMAL URINALYSIS: ICD-10-CM

## 2021-08-18 PROCEDURE — 81001 URINALYSIS AUTO W/SCOPE: CPT

## 2021-08-18 PROCEDURE — 87086 URINE CULTURE/COLONY COUNT: CPT

## 2021-08-19 LAB
AMORPH CRY #/AREA URNS HPF: PRESENT /HPF
APPEARANCE UR: ABNORMAL
BACTERIA #/AREA URNS HPF: ABNORMAL /HPF
BILIRUB UR QL STRIP.AUTO: ABNORMAL
COLOR UR: ABNORMAL
EPI CELLS #/AREA URNS HPF: NEGATIVE /HPF
GLUCOSE UR STRIP.AUTO-MCNC: NEGATIVE MG/DL
HYALINE CASTS #/AREA URNS LPF: ABNORMAL /LPF
KETONES UR STRIP.AUTO-MCNC: NEGATIVE MG/DL
LEUKOCYTE ESTERASE UR QL STRIP.AUTO: NEGATIVE
MICRO URNS: ABNORMAL
NITRITE UR QL STRIP.AUTO: NEGATIVE
PH UR STRIP.AUTO: 6 [PH] (ref 5–8)
PROT UR QL STRIP: NEGATIVE MG/DL
RBC # URNS HPF: ABNORMAL /HPF
RBC UR QL AUTO: NEGATIVE
SP GR UR STRIP.AUTO: 1.02
UROBILINOGEN UR STRIP.AUTO-MCNC: 0.2 MG/DL
WBC #/AREA URNS HPF: ABNORMAL /HPF

## 2021-08-25 ENCOUNTER — TELEPHONE (OUTPATIENT)
Dept: INTERNAL MEDICINE | Facility: IMAGING CENTER | Age: 71
End: 2021-08-25

## 2021-08-25 RX ORDER — NITROFURANTOIN 25; 75 MG/1; MG/1
100 CAPSULE ORAL 2 TIMES DAILY
Qty: 14 CAPSULE | Refills: 0 | Status: SHIPPED | OUTPATIENT
Start: 2021-08-25 | End: 2021-09-01

## 2021-08-25 NOTE — TELEPHONE ENCOUNTER
Internal Medicine (Sandy Renner R.N.) Dx:  Abnormal urinalysis  Specimen Information: Urine         0 Result Notes  Component 7 d ago   Significant Indicator POS Positive (POS)    Source UR    Site -    Culture Result - Abnormal     Culture Result  Abnormal   Aerococcus urinae   >100,000 cfu/mL     Resulting Agency M      Narrative  Performed by: M  Patient weight (in lbs)->0   Total urine volume units?->ML   Patient weight (in lbs)->0      Specimen Collected

## 2021-08-27 DIAGNOSIS — N40.1 BENIGN NON-NODULAR PROSTATIC HYPERPLASIA WITH LOWER URINARY TRACT SYMPTOMS: ICD-10-CM

## 2021-08-27 RX ORDER — TADALAFIL 5 MG/1
5 TABLET ORAL DAILY
Qty: 90 TABLET | Refills: 3 | Status: SHIPPED | OUTPATIENT
Start: 2021-08-27 | End: 2022-07-22 | Stop reason: SDUPTHER

## 2021-08-31 ENCOUNTER — PATIENT MESSAGE (OUTPATIENT)
Dept: INTERNAL MEDICINE | Facility: IMAGING CENTER | Age: 71
End: 2021-08-31

## 2021-08-31 DIAGNOSIS — N39.0 RECURRENT URINARY TRACT INFECTION: ICD-10-CM

## 2021-08-31 DIAGNOSIS — N40.1 BENIGN NON-NODULAR PROSTATIC HYPERPLASIA WITH LOWER URINARY TRACT SYMPTOMS: ICD-10-CM

## 2021-08-31 DIAGNOSIS — Z78.9 SELF-CATHETERIZES URINARY BLADDER: ICD-10-CM

## 2021-09-07 ENCOUNTER — NON-PROVIDER VISIT (OUTPATIENT)
Dept: INTERNAL MEDICINE | Facility: IMAGING CENTER | Age: 71
End: 2021-09-07
Payer: MEDICARE

## 2021-09-07 ENCOUNTER — HOSPITAL ENCOUNTER (OUTPATIENT)
Facility: MEDICAL CENTER | Age: 71
End: 2021-09-07
Attending: INTERNAL MEDICINE
Payer: MEDICARE

## 2021-09-07 DIAGNOSIS — R82.90 ABNORMAL URINALYSIS: ICD-10-CM

## 2021-09-07 DIAGNOSIS — N39.0 RECURRENT URINARY TRACT INFECTION: ICD-10-CM

## 2021-09-07 LAB
APPEARANCE UR: NORMAL
BILIRUB UR STRIP-MCNC: NEGATIVE MG/DL
COLOR UR AUTO: YELLOW
GLUCOSE UR STRIP.AUTO-MCNC: NEGATIVE MG/DL
KETONES UR STRIP.AUTO-MCNC: NEGATIVE MG/DL
LEUKOCYTE ESTERASE UR QL STRIP.AUTO: NORMAL
NITRITE UR QL STRIP.AUTO: NEGATIVE
PH UR STRIP.AUTO: 7 [PH] (ref 5–8)
PROT UR QL STRIP: 30 MG/DL
RBC UR QL AUTO: NORMAL
SP GR UR STRIP.AUTO: 1.02
UROBILINOGEN UR STRIP-MCNC: 0.2 MG/DL

## 2021-09-07 PROCEDURE — 87086 URINE CULTURE/COLONY COUNT: CPT

## 2021-09-07 PROCEDURE — 81002 URINALYSIS NONAUTO W/O SCOPE: CPT | Performed by: FAMILY MEDICINE

## 2021-09-11 RX ORDER — AMOXICILLIN AND CLAVULANATE POTASSIUM 500; 125 MG/1; MG/1
1 TABLET, FILM COATED ORAL 3 TIMES DAILY
Qty: 21 TABLET | Refills: 0 | Status: SHIPPED | OUTPATIENT
Start: 2021-09-11 | End: 2021-09-18

## 2021-10-08 ENCOUNTER — NON-PROVIDER VISIT (OUTPATIENT)
Dept: INTERNAL MEDICINE | Facility: IMAGING CENTER | Age: 71
End: 2021-10-08
Payer: MEDICARE

## 2021-10-08 ENCOUNTER — HOSPITAL ENCOUNTER (OUTPATIENT)
Facility: MEDICAL CENTER | Age: 71
End: 2021-10-08
Attending: INTERNAL MEDICINE
Payer: MEDICARE

## 2021-10-08 DIAGNOSIS — R82.90 BAD ODOR OF URINE: ICD-10-CM

## 2021-10-08 DIAGNOSIS — R82.90 CLOUDY URINE: ICD-10-CM

## 2021-10-08 DIAGNOSIS — R82.90 ABNORMAL URINALYSIS: ICD-10-CM

## 2021-10-08 DIAGNOSIS — Z23 NEED FOR INFLUENZA VACCINATION: ICD-10-CM

## 2021-10-08 LAB
APPEARANCE UR: NORMAL
BILIRUB UR STRIP-MCNC: NEGATIVE MG/DL
COLOR UR AUTO: YELLOW
GLUCOSE UR STRIP.AUTO-MCNC: NEGATIVE MG/DL
KETONES UR STRIP.AUTO-MCNC: NEGATIVE MG/DL
LEUKOCYTE ESTERASE UR QL STRIP.AUTO: NORMAL
NITRITE UR QL STRIP.AUTO: NEGATIVE
PH UR STRIP.AUTO: 7 [PH] (ref 5–8)
PROT UR QL STRIP: NEGATIVE MG/DL
RBC UR QL AUTO: NEGATIVE
SP GR UR STRIP.AUTO: 1.02
UROBILINOGEN UR STRIP-MCNC: 1 MG/DL

## 2021-10-08 PROCEDURE — G0008 ADMIN INFLUENZA VIRUS VAC: HCPCS | Performed by: INTERNAL MEDICINE

## 2021-10-08 PROCEDURE — 87077 CULTURE AEROBIC IDENTIFY: CPT

## 2021-10-08 PROCEDURE — 81002 URINALYSIS NONAUTO W/O SCOPE: CPT | Performed by: INTERNAL MEDICINE

## 2021-10-08 PROCEDURE — 87086 URINE CULTURE/COLONY COUNT: CPT

## 2021-10-08 PROCEDURE — 90662 IIV NO PRSV INCREASED AG IM: CPT | Performed by: INTERNAL MEDICINE

## 2021-10-16 LAB — TEST NAME 95000: NORMAL

## 2021-10-24 DIAGNOSIS — E78.2 MIXED HYPERLIPIDEMIA WITH APOLIPOPROTEIN E3 VARIANT: ICD-10-CM

## 2021-10-25 RX ORDER — EZETIMIBE 10 MG/1
TABLET ORAL
Qty: 90 TABLET | Refills: 3 | Status: SHIPPED | OUTPATIENT
Start: 2021-10-25 | End: 2022-11-02

## 2021-11-22 ENCOUNTER — NON-PROVIDER VISIT (OUTPATIENT)
Dept: INTERNAL MEDICINE | Facility: IMAGING CENTER | Age: 71
End: 2021-11-22
Payer: MEDICARE

## 2021-11-22 ENCOUNTER — HOSPITAL ENCOUNTER (OUTPATIENT)
Facility: MEDICAL CENTER | Age: 71
End: 2021-11-22
Attending: INTERNAL MEDICINE
Payer: MEDICARE

## 2021-11-22 DIAGNOSIS — E78.2 MIXED HYPERLIPIDEMIA WITH APOLIPOPROTEIN E3 VARIANT: ICD-10-CM

## 2021-11-22 DIAGNOSIS — E03.9 HYPOTHYROIDISM (ACQUIRED): ICD-10-CM

## 2021-11-22 DIAGNOSIS — Z79.899 LONG TERM USE OF DRUG: ICD-10-CM

## 2021-11-22 DIAGNOSIS — Z01.89 ENCOUNTER FOR ROUTINE LABORATORY TESTING: ICD-10-CM

## 2021-11-22 DIAGNOSIS — R82.90 ABNORMAL URINALYSIS: ICD-10-CM

## 2021-11-22 DIAGNOSIS — Z86.39 HISTORY OF NON ANEMIC VITAMIN B12 DEFICIENCY: ICD-10-CM

## 2021-11-22 DIAGNOSIS — Z12.5 PROSTATE CANCER SCREENING: ICD-10-CM

## 2021-11-22 DIAGNOSIS — Z86.39 HISTORY OF IRON DEFICIENCY: ICD-10-CM

## 2021-11-22 DIAGNOSIS — Z86.39 HISTORY OF VITAMIN D DEFICIENCY: ICD-10-CM

## 2021-11-22 DIAGNOSIS — K57.30 DIVERTICULOSIS OF COLON: ICD-10-CM

## 2021-11-22 LAB
ALBUMIN SERPL BCP-MCNC: 4.8 G/DL (ref 3.2–4.9)
ALBUMIN/GLOB SERPL: 1.8 G/DL
ALP SERPL-CCNC: 53 U/L (ref 30–99)
ALT SERPL-CCNC: 17 U/L (ref 2–50)
ANION GAP SERPL CALC-SCNC: 8 MMOL/L (ref 7–16)
APPEARANCE UR: CLEAR
AST SERPL-CCNC: 18 U/L (ref 12–45)
BACTERIA #/AREA URNS HPF: NEGATIVE /HPF
BASOPHILS # BLD AUTO: 0.8 % (ref 0–1.8)
BASOPHILS # BLD: 0.04 K/UL (ref 0–0.12)
BILIRUB SERPL-MCNC: 0.9 MG/DL (ref 0.1–1.5)
BILIRUB UR QL STRIP.AUTO: NEGATIVE
BUN SERPL-MCNC: 22 MG/DL (ref 8–22)
CALCIUM SERPL-MCNC: 9.3 MG/DL (ref 8.5–10.5)
CHLORIDE SERPL-SCNC: 102 MMOL/L (ref 96–112)
CHOLEST SERPL-MCNC: 156 MG/DL (ref 100–199)
CO2 SERPL-SCNC: 27 MMOL/L (ref 20–33)
COLOR UR: YELLOW
CREAT SERPL-MCNC: 1.02 MG/DL (ref 0.5–1.4)
EOSINOPHIL # BLD AUTO: 0.18 K/UL (ref 0–0.51)
EOSINOPHIL NFR BLD: 3.6 % (ref 0–6.9)
EPI CELLS #/AREA URNS HPF: NEGATIVE /HPF
ERYTHROCYTE [DISTWIDTH] IN BLOOD BY AUTOMATED COUNT: 46.1 FL (ref 35.9–50)
FERRITIN SERPL-MCNC: 25 NG/ML (ref 22–322)
GLOBULIN SER CALC-MCNC: 2.7 G/DL (ref 1.9–3.5)
GLUCOSE SERPL-MCNC: 91 MG/DL (ref 65–99)
GLUCOSE UR STRIP.AUTO-MCNC: NEGATIVE MG/DL
HCT VFR BLD AUTO: 47.2 % (ref 42–52)
HDLC SERPL-MCNC: 40 MG/DL
HGB BLD-MCNC: 15.8 G/DL (ref 14–18)
HYALINE CASTS #/AREA URNS LPF: ABNORMAL /LPF
IMM GRANULOCYTES # BLD AUTO: 0.01 K/UL (ref 0–0.11)
IMM GRANULOCYTES NFR BLD AUTO: 0.2 % (ref 0–0.9)
IRON SATN MFR SERPL: 28 % (ref 15–55)
IRON SERPL-MCNC: 103 UG/DL (ref 50–180)
KETONES UR STRIP.AUTO-MCNC: NEGATIVE MG/DL
LDLC SERPL CALC-MCNC: 93 MG/DL
LEUKOCYTE ESTERASE UR QL STRIP.AUTO: ABNORMAL
LYMPHOCYTES # BLD AUTO: 1.63 K/UL (ref 1–4.8)
LYMPHOCYTES NFR BLD: 32.7 % (ref 22–41)
MCH RBC QN AUTO: 33.1 PG (ref 27–33)
MCHC RBC AUTO-ENTMCNC: 33.5 G/DL (ref 33.7–35.3)
MCV RBC AUTO: 99 FL (ref 81.4–97.8)
MICRO URNS: ABNORMAL
MONOCYTES # BLD AUTO: 0.37 K/UL (ref 0–0.85)
MONOCYTES NFR BLD AUTO: 7.4 % (ref 0–13.4)
NEUTROPHILS # BLD AUTO: 2.76 K/UL (ref 1.82–7.42)
NEUTROPHILS NFR BLD: 55.3 % (ref 44–72)
NITRITE UR QL STRIP.AUTO: POSITIVE
NRBC # BLD AUTO: 0 K/UL
NRBC BLD-RTO: 0 /100 WBC
PH UR STRIP.AUTO: 7 [PH] (ref 5–8)
PLATELET # BLD AUTO: 224 K/UL (ref 164–446)
PMV BLD AUTO: 11.6 FL (ref 9–12.9)
POTASSIUM SERPL-SCNC: 4.3 MMOL/L (ref 3.6–5.5)
PROT SERPL-MCNC: 7.5 G/DL (ref 6–8.2)
PROT UR QL STRIP: NEGATIVE MG/DL
PSA SERPL-MCNC: 2.13 NG/ML (ref 0–4)
RBC # BLD AUTO: 4.77 M/UL (ref 4.7–6.1)
RBC # URNS HPF: ABNORMAL /HPF
RBC UR QL AUTO: NEGATIVE
SODIUM SERPL-SCNC: 137 MMOL/L (ref 135–145)
SP GR UR STRIP.AUTO: 1.02
T3 SERPL-MCNC: 93.9 NG/DL (ref 60–181)
TIBC SERPL-MCNC: 366 UG/DL (ref 250–450)
TRIGL SERPL-MCNC: 116 MG/DL (ref 0–149)
TSH SERPL DL<=0.005 MIU/L-ACNC: 2.39 UIU/ML (ref 0.38–5.33)
UIBC SERPL-MCNC: 263 UG/DL (ref 110–370)
UROBILINOGEN UR STRIP.AUTO-MCNC: 0.2 MG/DL
VIT B12 SERPL-MCNC: 583 PG/ML (ref 211–911)
WBC # BLD AUTO: 5 K/UL (ref 4.8–10.8)
WBC #/AREA URNS HPF: ABNORMAL /HPF

## 2021-11-22 PROCEDURE — 87186 SC STD MICRODIL/AGAR DIL: CPT

## 2021-11-22 PROCEDURE — 84153 ASSAY OF PSA TOTAL: CPT

## 2021-11-22 PROCEDURE — 80061 LIPID PANEL: CPT

## 2021-11-22 PROCEDURE — 83550 IRON BINDING TEST: CPT

## 2021-11-22 PROCEDURE — 83540 ASSAY OF IRON: CPT

## 2021-11-22 PROCEDURE — 84480 ASSAY TRIIODOTHYRONINE (T3): CPT

## 2021-11-22 PROCEDURE — 82306 VITAMIN D 25 HYDROXY: CPT

## 2021-11-22 PROCEDURE — 87077 CULTURE AEROBIC IDENTIFY: CPT

## 2021-11-22 PROCEDURE — 82607 VITAMIN B-12: CPT

## 2021-11-22 PROCEDURE — 84443 ASSAY THYROID STIM HORMONE: CPT

## 2021-11-22 PROCEDURE — 81001 URINALYSIS AUTO W/SCOPE: CPT

## 2021-11-22 PROCEDURE — 87086 URINE CULTURE/COLONY COUNT: CPT

## 2021-11-22 PROCEDURE — 85025 COMPLETE CBC W/AUTO DIFF WBC: CPT

## 2021-11-22 PROCEDURE — 80053 COMPREHEN METABOLIC PANEL: CPT

## 2021-11-22 PROCEDURE — 82728 ASSAY OF FERRITIN: CPT

## 2021-11-24 ENCOUNTER — TELEPHONE (OUTPATIENT)
Dept: INTERNAL MEDICINE | Facility: IMAGING CENTER | Age: 71
End: 2021-11-24

## 2021-11-24 LAB
25(OH)D3 SERPL-MCNC: 56 NG/ML (ref 30–80)
BACTERIA UR CULT: ABNORMAL
BACTERIA UR CULT: ABNORMAL
SIGNIFICANT IND 70042: ABNORMAL
SITE SITE: ABNORMAL
SOURCE SOURCE: ABNORMAL

## 2021-11-24 RX ORDER — SULFAMETHOXAZOLE AND TRIMETHOPRIM 800; 160 MG/1; MG/1
1 TABLET ORAL 2 TIMES DAILY
Qty: 14 TABLET | Refills: 0 | Status: SHIPPED | OUTPATIENT
Start: 2021-11-24 | End: 2021-12-01

## 2021-11-24 RX ORDER — CEPHALEXIN 500 MG/1
500 CAPSULE ORAL 3 TIMES DAILY
Qty: 21 CAPSULE | Refills: 0 | Status: SHIPPED | OUTPATIENT
Start: 2021-11-24 | End: 2021-12-01

## 2021-11-24 NOTE — TELEPHONE ENCOUNTER
"Patient with a history of recurrent UTI's shows infected urine on routine urinalysis.  He has no symptoms.  He admits to self cath \"1 time.\"  He has appointment with urology the week of Dec 13.  Prescription to preferred pharmacy per Dr De  "

## 2021-12-06 ENCOUNTER — OFFICE VISIT (OUTPATIENT)
Dept: INTERNAL MEDICINE | Facility: IMAGING CENTER | Age: 71
End: 2021-12-06
Payer: MEDICARE

## 2021-12-06 VITALS
RESPIRATION RATE: 14 BRPM | BODY MASS INDEX: 27.73 KG/M2 | WEIGHT: 223 LBS | TEMPERATURE: 98.2 F | HEIGHT: 75 IN | HEART RATE: 58 BPM | SYSTOLIC BLOOD PRESSURE: 122 MMHG | DIASTOLIC BLOOD PRESSURE: 70 MMHG | OXYGEN SATURATION: 97 %

## 2021-12-06 DIAGNOSIS — N39.0 RECURRENT UTI (URINARY TRACT INFECTION): ICD-10-CM

## 2021-12-06 DIAGNOSIS — Z86.39 HISTORY OF IRON DEFICIENCY: ICD-10-CM

## 2021-12-06 DIAGNOSIS — G47.9 TROUBLE IN SLEEPING: ICD-10-CM

## 2021-12-06 DIAGNOSIS — E78.00 HYPERCHOLESTEROLEMIA: ICD-10-CM

## 2021-12-06 DIAGNOSIS — E03.9 HYPOTHYROIDISM (ACQUIRED): ICD-10-CM

## 2021-12-06 DIAGNOSIS — I10 ESSENTIAL HYPERTENSION: ICD-10-CM

## 2021-12-06 PROCEDURE — 99214 OFFICE O/P EST MOD 30 MIN: CPT | Performed by: INTERNAL MEDICINE

## 2021-12-06 RX ORDER — TAMSULOSIN HYDROCHLORIDE 0.4 MG/1
0.4 CAPSULE ORAL DAILY
COMMUNITY
Start: 2021-11-24

## 2021-12-06 RX ORDER — LORAZEPAM 0.5 MG/1
0.5 TABLET ORAL
Qty: 30 TABLET | Refills: 0 | Status: SHIPPED | OUTPATIENT
Start: 2021-12-06 | End: 2022-01-05

## 2021-12-06 ASSESSMENT — FIBROSIS 4 INDEX: FIB4 SCORE: 1.34
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
## 2022-02-03 ENCOUNTER — NON-PROVIDER VISIT (OUTPATIENT)
Dept: INTERNAL MEDICINE | Facility: IMAGING CENTER | Age: 72
End: 2022-02-03
Payer: MEDICARE

## 2022-02-03 ENCOUNTER — HOSPITAL ENCOUNTER (OUTPATIENT)
Facility: MEDICAL CENTER | Age: 72
End: 2022-02-03
Attending: INTERNAL MEDICINE
Payer: MEDICARE

## 2022-02-03 DIAGNOSIS — R30.0 DYSURIA: ICD-10-CM

## 2022-02-03 DIAGNOSIS — R35.0 URINARY FREQUENCY: ICD-10-CM

## 2022-02-03 DIAGNOSIS — R82.90 ABNORMAL URINALYSIS: ICD-10-CM

## 2022-02-03 LAB
APPEARANCE UR: NORMAL
BILIRUB UR STRIP-MCNC: NEGATIVE MG/DL
COLOR UR AUTO: YELLOW
GLUCOSE UR STRIP.AUTO-MCNC: NEGATIVE MG/DL
KETONES UR STRIP.AUTO-MCNC: NEGATIVE MG/DL
LEUKOCYTE ESTERASE UR QL STRIP.AUTO: NORMAL
NITRITE UR QL STRIP.AUTO: POSITIVE
PH UR STRIP.AUTO: 7 [PH] (ref 5–8)
PROT UR QL STRIP: NEGATIVE MG/DL
RBC UR QL AUTO: NORMAL
SP GR UR STRIP.AUTO: 1.02
UROBILINOGEN UR STRIP-MCNC: 1 MG/DL

## 2022-02-03 PROCEDURE — 81002 URINALYSIS NONAUTO W/O SCOPE: CPT | Performed by: INTERNAL MEDICINE

## 2022-02-03 PROCEDURE — 87186 SC STD MICRODIL/AGAR DIL: CPT

## 2022-02-03 PROCEDURE — 87086 URINE CULTURE/COLONY COUNT: CPT

## 2022-02-03 PROCEDURE — 87077 CULTURE AEROBIC IDENTIFY: CPT

## 2022-02-03 RX ORDER — SULFAMETHOXAZOLE AND TRIMETHOPRIM 800; 160 MG/1; MG/1
1 TABLET ORAL 2 TIMES DAILY
Qty: 14 TABLET | Refills: 0 | Status: SHIPPED | OUTPATIENT
Start: 2022-02-03 | End: 2022-02-10

## 2022-02-09 ENCOUNTER — PATIENT MESSAGE (OUTPATIENT)
Dept: INTERNAL MEDICINE | Facility: IMAGING CENTER | Age: 72
End: 2022-02-09

## 2022-02-09 DIAGNOSIS — N39.0 RECURRENT UTI (URINARY TRACT INFECTION): ICD-10-CM

## 2022-02-09 DIAGNOSIS — R33.9 URINARY RETENTION: ICD-10-CM

## 2022-02-09 DIAGNOSIS — Z78.9 SELF-CATHETERIZES URINARY BLADDER: ICD-10-CM

## 2022-02-09 DIAGNOSIS — N40.1 BENIGN NON-NODULAR PROSTATIC HYPERPLASIA WITH LOWER URINARY TRACT SYMPTOMS: ICD-10-CM

## 2022-02-09 RX ORDER — CEPHALEXIN 500 MG/1
500 CAPSULE ORAL 3 TIMES DAILY
Qty: 21 CAPSULE | Refills: 0 | Status: SHIPPED | OUTPATIENT
Start: 2022-02-09 | End: 2022-02-16

## 2022-03-13 DIAGNOSIS — E03.9 HYPOTHYROIDISM (ACQUIRED): ICD-10-CM

## 2022-03-14 RX ORDER — LEVOTHYROXINE SODIUM 0.07 MG/1
TABLET ORAL
Qty: 90 TABLET | Refills: 3 | Status: SHIPPED | OUTPATIENT
Start: 2022-03-14 | End: 2023-03-14 | Stop reason: SDUPTHER

## 2022-04-11 ENCOUNTER — TELEPHONE (OUTPATIENT)
Dept: INTERNAL MEDICINE | Facility: IMAGING CENTER | Age: 72
End: 2022-04-11
Payer: MEDICARE

## 2022-04-11 RX ORDER — ROSUVASTATIN CALCIUM 5 MG/1
5 TABLET, COATED ORAL
Qty: 45 TABLET | Refills: 3 | Status: SHIPPED | OUTPATIENT
Start: 2022-04-11 | End: 2023-06-05 | Stop reason: SDUPTHER

## 2022-04-11 NOTE — TELEPHONE ENCOUNTER
----- Message from Checo Hughes sent at 4/10/2022  8:26 AM PDT -----  Regarding: Rosuvastatin Tabs  When you have a chance, would you please change my Rosuvastatin Tabs prescription from 10 mg to 5 mg with [facility]. I am taking 5 mg 3 times per week.    Thanks    Emre

## 2022-04-15 PROBLEM — R33.9 BLADDER RETENTION OF URINE: Status: ACTIVE | Noted: 2022-04-15

## 2022-04-15 PROBLEM — Z87.440 HISTORY OF RECURRENT UTI (URINARY TRACT INFECTION): Status: ACTIVE | Noted: 2022-04-15

## 2022-05-04 ENCOUNTER — OFFICE VISIT (OUTPATIENT)
Dept: INTERNAL MEDICINE | Facility: IMAGING CENTER | Age: 72
End: 2022-05-04
Payer: MEDICARE

## 2022-05-04 DIAGNOSIS — S61.219A LACERATION OF SKIN OF FINGER, INITIAL ENCOUNTER: ICD-10-CM

## 2022-05-04 PROCEDURE — 99999 PR NO CHARGE: CPT | Performed by: INTERNAL MEDICINE

## 2022-05-04 PROCEDURE — 12001 RPR S/N/AX/GEN/TRNK 2.5CM/<: CPT | Performed by: INTERNAL MEDICINE
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
## 2022-06-01 ENCOUNTER — NON-PROVIDER VISIT (OUTPATIENT)
Dept: INTERNAL MEDICINE | Facility: IMAGING CENTER | Age: 72
End: 2022-06-01
Payer: MEDICARE

## 2022-06-01 ENCOUNTER — HOSPITAL ENCOUNTER (OUTPATIENT)
Facility: MEDICAL CENTER | Age: 72
End: 2022-06-01
Attending: INTERNAL MEDICINE
Payer: MEDICARE

## 2022-06-01 DIAGNOSIS — I10 ESSENTIAL HYPERTENSION: ICD-10-CM

## 2022-06-01 DIAGNOSIS — E78.00 HYPERCHOLESTEROLEMIA: ICD-10-CM

## 2022-06-01 DIAGNOSIS — Z86.39 HISTORY OF IRON DEFICIENCY: ICD-10-CM

## 2022-06-01 DIAGNOSIS — N39.0 RECURRENT UTI (URINARY TRACT INFECTION): ICD-10-CM

## 2022-06-01 DIAGNOSIS — E03.9 HYPOTHYROIDISM (ACQUIRED): ICD-10-CM

## 2022-06-01 DIAGNOSIS — Z01.89 ENCOUNTER FOR ROUTINE LABORATORY TESTING: ICD-10-CM

## 2022-06-01 LAB
ALBUMIN SERPL BCP-MCNC: 4.4 G/DL (ref 3.2–4.9)
ALBUMIN/GLOB SERPL: 1.6 G/DL
ALP SERPL-CCNC: 65 U/L (ref 30–99)
ALT SERPL-CCNC: 19 U/L (ref 2–50)
ANION GAP SERPL CALC-SCNC: 9 MMOL/L (ref 7–16)
APPEARANCE UR: CLEAR
AST SERPL-CCNC: 33 U/L (ref 12–45)
BILIRUB SERPL-MCNC: 0.6 MG/DL (ref 0.1–1.5)
BILIRUB UR QL STRIP.AUTO: NEGATIVE
BUN SERPL-MCNC: 23 MG/DL (ref 8–22)
CALCIUM SERPL-MCNC: 8.9 MG/DL (ref 8.5–10.5)
CHLORIDE SERPL-SCNC: 102 MMOL/L (ref 96–112)
CHOLEST SERPL-MCNC: 134 MG/DL (ref 100–199)
CO2 SERPL-SCNC: 27 MMOL/L (ref 20–33)
COLOR UR: YELLOW
CREAT SERPL-MCNC: 1.08 MG/DL (ref 0.5–1.4)
FERRITIN SERPL-MCNC: 28.7 NG/ML (ref 22–322)
GFR SERPLBLD CREATININE-BSD FMLA CKD-EPI: 74 ML/MIN/1.73 M 2
GLOBULIN SER CALC-MCNC: 2.8 G/DL (ref 1.9–3.5)
GLUCOSE SERPL-MCNC: 93 MG/DL (ref 65–99)
GLUCOSE UR STRIP.AUTO-MCNC: NEGATIVE MG/DL
HDLC SERPL-MCNC: 40 MG/DL
IRON SATN MFR SERPL: 15 % (ref 15–55)
IRON SERPL-MCNC: 56 UG/DL (ref 50–180)
KETONES UR STRIP.AUTO-MCNC: NEGATIVE MG/DL
LDLC SERPL CALC-MCNC: 77 MG/DL
LEUKOCYTE ESTERASE UR QL STRIP.AUTO: NEGATIVE
MICRO URNS: NORMAL
NITRITE UR QL STRIP.AUTO: NEGATIVE
PH UR STRIP.AUTO: 5 [PH] (ref 5–8)
POTASSIUM SERPL-SCNC: 4.3 MMOL/L (ref 3.6–5.5)
PROT SERPL-MCNC: 7.2 G/DL (ref 6–8.2)
PROT UR QL STRIP: NEGATIVE MG/DL
RBC UR QL AUTO: NEGATIVE
SODIUM SERPL-SCNC: 138 MMOL/L (ref 135–145)
SP GR UR STRIP.AUTO: 1.02
T3 SERPL-MCNC: 93.4 NG/DL (ref 60–181)
TIBC SERPL-MCNC: 373 UG/DL (ref 250–450)
TRIGL SERPL-MCNC: 85 MG/DL (ref 0–149)
TSH SERPL DL<=0.005 MIU/L-ACNC: 2.51 UIU/ML (ref 0.38–5.33)
UIBC SERPL-MCNC: 317 UG/DL (ref 110–370)
UROBILINOGEN UR STRIP.AUTO-MCNC: 0.2 MG/DL

## 2022-06-01 PROCEDURE — 81003 URINALYSIS AUTO W/O SCOPE: CPT

## 2022-06-01 PROCEDURE — 82728 ASSAY OF FERRITIN: CPT

## 2022-06-01 PROCEDURE — 83550 IRON BINDING TEST: CPT

## 2022-06-01 PROCEDURE — 84443 ASSAY THYROID STIM HORMONE: CPT

## 2022-06-01 PROCEDURE — 84480 ASSAY TRIIODOTHYRONINE (T3): CPT

## 2022-06-01 PROCEDURE — 80053 COMPREHEN METABOLIC PANEL: CPT

## 2022-06-01 PROCEDURE — 80061 LIPID PANEL: CPT

## 2022-06-01 PROCEDURE — 83540 ASSAY OF IRON: CPT

## 2022-06-17 ENCOUNTER — HOSPITAL ENCOUNTER (OUTPATIENT)
Dept: RADIOLOGY | Facility: MEDICAL CENTER | Age: 72
End: 2022-06-17
Attending: INTERNAL MEDICINE
Payer: COMMERCIAL

## 2022-06-17 ENCOUNTER — PATIENT MESSAGE (OUTPATIENT)
Dept: INTERNAL MEDICINE | Facility: IMAGING CENTER | Age: 72
End: 2022-06-17
Payer: MEDICARE

## 2022-06-17 DIAGNOSIS — E78.00 HYPERCHOLESTEROLEMIA: ICD-10-CM

## 2022-06-17 DIAGNOSIS — I10 ESSENTIAL HYPERTENSION: ICD-10-CM

## 2022-06-17 PROCEDURE — 4410556 CT-CARDIAC SCORING (SELF PAY ONLY)

## 2022-06-17 PROCEDURE — 306734 US-TOTAL VASCULAR SCREENING (S/P)

## 2022-06-17 PROCEDURE — 93998 UNLISTD NONINVAS VASC DX STD: CPT

## 2022-06-17 RX ORDER — CEFDINIR 300 MG/1
300 CAPSULE ORAL 2 TIMES DAILY
Qty: 20 CAPSULE | Refills: 0 | Status: SHIPPED | OUTPATIENT
Start: 2022-06-17 | End: 2022-06-27

## 2022-06-24 ENCOUNTER — OFFICE VISIT (OUTPATIENT)
Dept: INTERNAL MEDICINE | Facility: IMAGING CENTER | Age: 72
End: 2022-06-24
Payer: MEDICARE

## 2022-06-24 VITALS
BODY MASS INDEX: 27.98 KG/M2 | RESPIRATION RATE: 14 BRPM | TEMPERATURE: 97.2 F | OXYGEN SATURATION: 97 % | HEIGHT: 75 IN | DIASTOLIC BLOOD PRESSURE: 64 MMHG | WEIGHT: 225 LBS | SYSTOLIC BLOOD PRESSURE: 118 MMHG | HEART RATE: 56 BPM

## 2022-06-24 DIAGNOSIS — E03.9 HYPOTHYROIDISM (ACQUIRED): ICD-10-CM

## 2022-06-24 DIAGNOSIS — Z12.83 SCREENING FOR MALIGNANT NEOPLASM OF SKIN: ICD-10-CM

## 2022-06-24 DIAGNOSIS — Z86.39 HISTORY OF IRON DEFICIENCY: ICD-10-CM

## 2022-06-24 DIAGNOSIS — K57.30 DIVERTICULOSIS OF COLON: ICD-10-CM

## 2022-06-24 DIAGNOSIS — J30.9 ALLERGIC RHINITIS, UNSPECIFIED SEASONALITY, UNSPECIFIED TRIGGER: ICD-10-CM

## 2022-06-24 DIAGNOSIS — E78.2 MIXED HYPERLIPIDEMIA WITH APOLIPOPROTEIN E3 VARIANT: ICD-10-CM

## 2022-06-24 DIAGNOSIS — Z00.00 MEDICARE ANNUAL WELLNESS VISIT, SUBSEQUENT: ICD-10-CM

## 2022-06-24 DIAGNOSIS — Z79.899 LONG TERM USE OF DRUG: ICD-10-CM

## 2022-06-24 DIAGNOSIS — Z86.39 HISTORY OF VITAMIN D DEFICIENCY: ICD-10-CM

## 2022-06-24 DIAGNOSIS — E78.00 HYPERCHOLESTEROLEMIA: ICD-10-CM

## 2022-06-24 DIAGNOSIS — Z12.5 SCREENING FOR PROSTATE CANCER: ICD-10-CM

## 2022-06-24 DIAGNOSIS — Z86.39 HISTORY OF NON ANEMIC VITAMIN B12 DEFICIENCY: ICD-10-CM

## 2022-06-24 PROCEDURE — G0439 PPPS, SUBSEQ VISIT: HCPCS | Performed by: INTERNAL MEDICINE

## 2022-06-24 RX ORDER — FLUTICASONE PROPIONATE 50 MCG
1 SPRAY, SUSPENSION (ML) NASAL 2 TIMES DAILY
Qty: 16 G | Refills: 11 | COMMUNITY
Start: 2022-06-24

## 2022-06-24 ASSESSMENT — ENCOUNTER SYMPTOMS: GENERAL WELL-BEING: EXCELLENT

## 2022-06-24 ASSESSMENT — FIBROSIS 4 INDEX: FIB4 SCORE: 2.37

## 2022-06-24 ASSESSMENT — ACTIVITIES OF DAILY LIVING (ADL): BATHING_REQUIRES_ASSISTANCE: 0

## 2022-06-24 ASSESSMENT — PATIENT HEALTH QUESTIONNAIRE - PHQ9: CLINICAL INTERPRETATION OF PHQ2 SCORE: 0
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
## 2022-07-22 ENCOUNTER — PATIENT MESSAGE (OUTPATIENT)
Dept: INTERNAL MEDICINE | Facility: IMAGING CENTER | Age: 72
End: 2022-07-22
Payer: MEDICARE

## 2022-07-22 DIAGNOSIS — N40.1 BENIGN NON-NODULAR PROSTATIC HYPERPLASIA WITH LOWER URINARY TRACT SYMPTOMS: ICD-10-CM

## 2022-07-22 RX ORDER — TADALAFIL 5 MG/1
5 TABLET ORAL DAILY
Qty: 90 TABLET | Refills: 1 | Status: SHIPPED | OUTPATIENT
Start: 2022-07-22

## 2022-08-16 ENCOUNTER — APPOINTMENT (RX ONLY)
Dept: URBAN - METROPOLITAN AREA CLINIC 31 | Facility: CLINIC | Age: 72
Setting detail: DERMATOLOGY
End: 2022-08-16

## 2022-08-16 DIAGNOSIS — D485 NEOPLASM OF UNCERTAIN BEHAVIOR OF SKIN: ICD-10-CM

## 2022-08-16 DIAGNOSIS — Z71.89 OTHER SPECIFIED COUNSELING: ICD-10-CM

## 2022-08-16 DIAGNOSIS — L81.4 OTHER MELANIN HYPERPIGMENTATION: ICD-10-CM

## 2022-08-16 DIAGNOSIS — I87.2 VENOUS INSUFFICIENCY (CHRONIC) (PERIPHERAL): ICD-10-CM

## 2022-08-16 DIAGNOSIS — D18.0 HEMANGIOMA: ICD-10-CM

## 2022-08-16 DIAGNOSIS — D22 MELANOCYTIC NEVI: ICD-10-CM

## 2022-08-16 DIAGNOSIS — L57.0 ACTINIC KERATOSIS: ICD-10-CM

## 2022-08-16 DIAGNOSIS — L73.8 OTHER SPECIFIED FOLLICULAR DISORDERS: ICD-10-CM

## 2022-08-16 DIAGNOSIS — L82.1 OTHER SEBORRHEIC KERATOSIS: ICD-10-CM

## 2022-08-16 PROBLEM — D22.71 MELANOCYTIC NEVI OF RIGHT LOWER LIMB, INCLUDING HIP: Status: ACTIVE | Noted: 2022-08-16

## 2022-08-16 PROBLEM — D22.5 MELANOCYTIC NEVI OF TRUNK: Status: ACTIVE | Noted: 2022-08-16

## 2022-08-16 PROBLEM — D22.72 MELANOCYTIC NEVI OF LEFT LOWER LIMB, INCLUDING HIP: Status: ACTIVE | Noted: 2022-08-16

## 2022-08-16 PROBLEM — D22.61 MELANOCYTIC NEVI OF RIGHT UPPER LIMB, INCLUDING SHOULDER: Status: ACTIVE | Noted: 2022-08-16

## 2022-08-16 PROBLEM — D22.62 MELANOCYTIC NEVI OF LEFT UPPER LIMB, INCLUDING SHOULDER: Status: ACTIVE | Noted: 2022-08-16

## 2022-08-16 PROBLEM — D18.01 HEMANGIOMA OF SKIN AND SUBCUTANEOUS TISSUE: Status: ACTIVE | Noted: 2022-08-16

## 2022-08-16 PROBLEM — D48.5 NEOPLASM OF UNCERTAIN BEHAVIOR OF SKIN: Status: ACTIVE | Noted: 2022-08-16

## 2022-08-16 PROCEDURE — ? BIOPSY BY SHAVE METHOD

## 2022-08-16 PROCEDURE — 17003 DESTRUCT PREMALG LES 2-14: CPT

## 2022-08-16 PROCEDURE — ? COUNSELING

## 2022-08-16 PROCEDURE — 17000 DESTRUCT PREMALG LESION: CPT | Mod: 59

## 2022-08-16 PROCEDURE — ? LIQUID NITROGEN

## 2022-08-16 PROCEDURE — 11102 TANGNTL BX SKIN SINGLE LES: CPT

## 2022-08-16 PROCEDURE — 99203 OFFICE O/P NEW LOW 30 MIN: CPT | Mod: 25

## 2022-08-16 ASSESSMENT — LOCATION DETAILED DESCRIPTION DERM
LOCATION DETAILED: PERIUMBILICAL SKIN
LOCATION DETAILED: LEFT PROXIMAL DORSAL FOREARM
LOCATION DETAILED: RIGHT SUPERIOR CENTRAL MALAR CHEEK
LOCATION DETAILED: LEFT PROXIMAL POSTERIOR UPPER ARM
LOCATION DETAILED: RIGHT INFERIOR MEDIAL UPPER BACK
LOCATION DETAILED: LEFT ANTERIOR DISTAL UPPER ARM
LOCATION DETAILED: RIGHT PROXIMAL DORSAL FOREARM
LOCATION DETAILED: RIGHT ANTERIOR DISTAL UPPER ARM
LOCATION DETAILED: LEFT SUPERIOR MEDIAL MALAR CHEEK
LOCATION DETAILED: RIGHT VENTRAL DISTAL FOREARM
LOCATION DETAILED: RIGHT DISTAL DORSAL FOREARM
LOCATION DETAILED: RIGHT CENTRAL MALAR CHEEK
LOCATION DETAILED: LEFT CENTRAL FRONTAL SCALP
LOCATION DETAILED: RIGHT SUPERIOR PARIETAL SCALP
LOCATION DETAILED: LEFT ANTERIOR DISTAL THIGH
LOCATION DETAILED: RIGHT ULNAR DORSAL HAND
LOCATION DETAILED: POSTERIOR MID-PARIETAL SCALP
LOCATION DETAILED: RIGHT DISTAL PRETIBIAL REGION
LOCATION DETAILED: LEFT PROXIMAL CALF
LOCATION DETAILED: RIGHT CENTRAL PARIETAL SCALP
LOCATION DETAILED: RIGHT ANTERIOR DISTAL THIGH
LOCATION DETAILED: LEFT RADIAL DORSAL HAND
LOCATION DETAILED: EPIGASTRIC SKIN
LOCATION DETAILED: LEFT POSTERIOR SHOULDER
LOCATION DETAILED: LEFT DISTAL PRETIBIAL REGION
LOCATION DETAILED: RIGHT SUPERIOR MEDIAL MIDBACK
LOCATION DETAILED: LEFT VENTRAL DISTAL FOREARM
LOCATION DETAILED: RIGHT MEDIAL FRONTAL SCALP
LOCATION DETAILED: RIGHT POSTERIOR SHOULDER
LOCATION DETAILED: RIGHT PROXIMAL POSTERIOR UPPER ARM
LOCATION DETAILED: LEFT LATERAL MALAR CHEEK
LOCATION DETAILED: RIGHT PROXIMAL CALF
LOCATION DETAILED: RIGHT SUPERIOR LATERAL UPPER BACK
LOCATION DETAILED: INFERIOR THORACIC SPINE

## 2022-08-16 ASSESSMENT — LOCATION SIMPLE DESCRIPTION DERM
LOCATION SIMPLE: RIGHT CHEEK
LOCATION SIMPLE: LEFT UPPER ARM
LOCATION SIMPLE: LEFT CALF
LOCATION SIMPLE: UPPER BACK
LOCATION SIMPLE: RIGHT SHOULDER
LOCATION SIMPLE: LEFT PRETIBIAL REGION
LOCATION SIMPLE: SCALP
LOCATION SIMPLE: LEFT THIGH
LOCATION SIMPLE: LEFT FOREARM
LOCATION SIMPLE: ABDOMEN
LOCATION SIMPLE: LEFT HAND
LOCATION SIMPLE: LEFT SCALP
LOCATION SIMPLE: LEFT SHOULDER
LOCATION SIMPLE: POSTERIOR SCALP
LOCATION SIMPLE: RIGHT CALF
LOCATION SIMPLE: LEFT CHEEK
LOCATION SIMPLE: RIGHT FOREARM
LOCATION SIMPLE: RIGHT UPPER ARM
LOCATION SIMPLE: RIGHT SCALP
LOCATION SIMPLE: RIGHT LOWER BACK
LOCATION SIMPLE: RIGHT UPPER BACK
LOCATION SIMPLE: RIGHT PRETIBIAL REGION
LOCATION SIMPLE: RIGHT HAND
LOCATION SIMPLE: RIGHT THIGH

## 2022-08-16 ASSESSMENT — LOCATION ZONE DERM
LOCATION ZONE: SCALP
LOCATION ZONE: LEG
LOCATION ZONE: FACE
LOCATION ZONE: HAND
LOCATION ZONE: TRUNK
LOCATION ZONE: ARM

## 2022-10-17 ENCOUNTER — NON-PROVIDER VISIT (OUTPATIENT)
Dept: INTERNAL MEDICINE | Facility: IMAGING CENTER | Age: 72
End: 2022-10-17
Payer: MEDICARE

## 2022-10-17 DIAGNOSIS — Z23 NEED FOR INFLUENZA VACCINATION: ICD-10-CM

## 2022-10-17 PROCEDURE — G0008 ADMIN INFLUENZA VIRUS VAC: HCPCS | Performed by: INTERNAL MEDICINE

## 2022-10-17 PROCEDURE — 90662 IIV NO PRSV INCREASED AG IM: CPT | Performed by: INTERNAL MEDICINE

## 2022-11-02 DIAGNOSIS — E78.2 MIXED HYPERLIPIDEMIA WITH APOLIPOPROTEIN E3 VARIANT: ICD-10-CM

## 2022-11-02 RX ORDER — EZETIMIBE 10 MG/1
TABLET ORAL
Qty: 90 TABLET | Refills: 3 | Status: SHIPPED | OUTPATIENT
Start: 2022-11-02 | End: 2023-11-01 | Stop reason: SDUPTHER

## 2022-11-08 ENCOUNTER — PATIENT MESSAGE (OUTPATIENT)
Dept: HEALTH INFORMATION MANAGEMENT | Facility: OTHER | Age: 72
End: 2022-11-08

## 2022-12-01 ENCOUNTER — HOSPITAL ENCOUNTER (OUTPATIENT)
Facility: MEDICAL CENTER | Age: 72
End: 2022-12-01
Attending: INTERNAL MEDICINE
Payer: MEDICARE

## 2022-12-01 ENCOUNTER — NON-PROVIDER VISIT (OUTPATIENT)
Dept: INTERNAL MEDICINE | Facility: IMAGING CENTER | Age: 72
End: 2022-12-01
Payer: MEDICARE

## 2022-12-01 DIAGNOSIS — Z12.5 SCREENING FOR PROSTATE CANCER: ICD-10-CM

## 2022-12-01 DIAGNOSIS — Z86.39 HISTORY OF IRON DEFICIENCY: ICD-10-CM

## 2022-12-01 DIAGNOSIS — E03.9 HYPOTHYROIDISM (ACQUIRED): ICD-10-CM

## 2022-12-01 DIAGNOSIS — E78.00 HYPERCHOLESTEROLEMIA: ICD-10-CM

## 2022-12-01 DIAGNOSIS — Z79.899 LONG TERM USE OF DRUG: ICD-10-CM

## 2022-12-01 DIAGNOSIS — K57.30 DIVERTICULOSIS OF COLON: ICD-10-CM

## 2022-12-01 DIAGNOSIS — Z86.39 HISTORY OF NON ANEMIC VITAMIN B12 DEFICIENCY: ICD-10-CM

## 2022-12-01 DIAGNOSIS — Z01.89 ENCOUNTER FOR ROUTINE LABORATORY TESTING: ICD-10-CM

## 2022-12-01 DIAGNOSIS — Z86.39 HISTORY OF VITAMIN D DEFICIENCY: ICD-10-CM

## 2022-12-01 LAB
APPEARANCE UR: CLEAR
BILIRUB UR QL STRIP.AUTO: NEGATIVE
COLOR UR: YELLOW
GLUCOSE UR STRIP.AUTO-MCNC: NEGATIVE MG/DL
KETONES UR STRIP.AUTO-MCNC: NEGATIVE MG/DL
LEUKOCYTE ESTERASE UR QL STRIP.AUTO: NEGATIVE
MICRO URNS: NORMAL
NITRITE UR QL STRIP.AUTO: NEGATIVE
PH UR STRIP.AUTO: 6.5 [PH] (ref 5–8)
PROT UR QL STRIP: NEGATIVE MG/DL
RBC UR QL AUTO: NEGATIVE
SP GR UR STRIP.AUTO: 1.02
UROBILINOGEN UR STRIP.AUTO-MCNC: 0.2 MG/DL

## 2022-12-01 PROCEDURE — 82607 VITAMIN B-12: CPT

## 2022-12-01 PROCEDURE — 83540 ASSAY OF IRON: CPT

## 2022-12-01 PROCEDURE — 80061 LIPID PANEL: CPT

## 2022-12-01 PROCEDURE — 82728 ASSAY OF FERRITIN: CPT

## 2022-12-01 PROCEDURE — 84153 ASSAY OF PSA TOTAL: CPT

## 2022-12-01 PROCEDURE — 80053 COMPREHEN METABOLIC PANEL: CPT

## 2022-12-01 PROCEDURE — 81003 URINALYSIS AUTO W/O SCOPE: CPT

## 2022-12-01 PROCEDURE — 84443 ASSAY THYROID STIM HORMONE: CPT

## 2022-12-01 PROCEDURE — 84480 ASSAY TRIIODOTHYRONINE (T3): CPT

## 2022-12-01 PROCEDURE — 83550 IRON BINDING TEST: CPT

## 2022-12-01 PROCEDURE — 85025 COMPLETE CBC W/AUTO DIFF WBC: CPT

## 2022-12-01 PROCEDURE — 82306 VITAMIN D 25 HYDROXY: CPT

## 2022-12-02 LAB
25(OH)D3 SERPL-MCNC: 71 NG/ML (ref 30–100)
ALBUMIN SERPL BCP-MCNC: 4.3 G/DL (ref 3.2–4.9)
ALBUMIN/GLOB SERPL: 1.7 G/DL
ALP SERPL-CCNC: 50 U/L (ref 30–99)
ALT SERPL-CCNC: 15 U/L (ref 2–50)
ANION GAP SERPL CALC-SCNC: 9 MMOL/L (ref 7–16)
AST SERPL-CCNC: 27 U/L (ref 12–45)
BASOPHILS # BLD AUTO: 0.6 % (ref 0–1.8)
BASOPHILS # BLD: 0.03 K/UL (ref 0–0.12)
BILIRUB SERPL-MCNC: 0.8 MG/DL (ref 0.1–1.5)
BUN SERPL-MCNC: 21 MG/DL (ref 8–22)
CALCIUM SERPL-MCNC: 9.1 MG/DL (ref 8.5–10.5)
CHLORIDE SERPL-SCNC: 104 MMOL/L (ref 96–112)
CHOLEST SERPL-MCNC: 145 MG/DL (ref 100–199)
CO2 SERPL-SCNC: 26 MMOL/L (ref 20–33)
CREAT SERPL-MCNC: 1.01 MG/DL (ref 0.5–1.4)
EOSINOPHIL # BLD AUTO: 0.13 K/UL (ref 0–0.51)
EOSINOPHIL NFR BLD: 2.7 % (ref 0–6.9)
ERYTHROCYTE [DISTWIDTH] IN BLOOD BY AUTOMATED COUNT: 46.2 FL (ref 35.9–50)
FERRITIN SERPL-MCNC: 26.9 NG/ML (ref 22–322)
GFR SERPLBLD CREATININE-BSD FMLA CKD-EPI: 80 ML/MIN/1.73 M 2
GLOBULIN SER CALC-MCNC: 2.6 G/DL (ref 1.9–3.5)
GLUCOSE SERPL-MCNC: 87 MG/DL (ref 65–99)
HCT VFR BLD AUTO: 46.7 % (ref 42–52)
HDLC SERPL-MCNC: 38 MG/DL
HGB BLD-MCNC: 15.6 G/DL (ref 14–18)
IMM GRANULOCYTES # BLD AUTO: 0.01 K/UL (ref 0–0.11)
IMM GRANULOCYTES NFR BLD AUTO: 0.2 % (ref 0–0.9)
IRON SATN MFR SERPL: 22 % (ref 15–55)
IRON SERPL-MCNC: 75 UG/DL (ref 50–180)
LDLC SERPL CALC-MCNC: 83 MG/DL
LYMPHOCYTES # BLD AUTO: 1.37 K/UL (ref 1–4.8)
LYMPHOCYTES NFR BLD: 28.5 % (ref 22–41)
MCH RBC QN AUTO: 32 PG (ref 27–33)
MCHC RBC AUTO-ENTMCNC: 33.4 G/DL (ref 33.7–35.3)
MCV RBC AUTO: 95.7 FL (ref 81.4–97.8)
MONOCYTES # BLD AUTO: 0.37 K/UL (ref 0–0.85)
MONOCYTES NFR BLD AUTO: 7.7 % (ref 0–13.4)
NEUTROPHILS # BLD AUTO: 2.89 K/UL (ref 1.82–7.42)
NEUTROPHILS NFR BLD: 60.3 % (ref 44–72)
NRBC # BLD AUTO: 0 K/UL
NRBC BLD-RTO: 0 /100 WBC
PLATELET # BLD AUTO: 202 K/UL (ref 164–446)
PMV BLD AUTO: 11.6 FL (ref 9–12.9)
POTASSIUM SERPL-SCNC: 4.4 MMOL/L (ref 3.6–5.5)
PROT SERPL-MCNC: 6.9 G/DL (ref 6–8.2)
PSA SERPL-MCNC: 2.09 NG/ML (ref 0–4)
RBC # BLD AUTO: 4.88 M/UL (ref 4.7–6.1)
SODIUM SERPL-SCNC: 139 MMOL/L (ref 135–145)
T3 SERPL-MCNC: 97.1 NG/DL (ref 60–181)
TIBC SERPL-MCNC: 338 UG/DL (ref 250–450)
TRIGL SERPL-MCNC: 120 MG/DL (ref 0–149)
TSH SERPL DL<=0.005 MIU/L-ACNC: 2.5 UIU/ML (ref 0.38–5.33)
UIBC SERPL-MCNC: 263 UG/DL (ref 110–370)
VIT B12 SERPL-MCNC: 577 PG/ML (ref 211–911)
WBC # BLD AUTO: 4.8 K/UL (ref 4.8–10.8)

## 2022-12-09 ENCOUNTER — APPOINTMENT (RX ONLY)
Dept: URBAN - METROPOLITAN AREA CLINIC 36 | Facility: CLINIC | Age: 72
Setting detail: DERMATOLOGY
End: 2022-12-09

## 2022-12-09 PROBLEM — C44.319 BASAL CELL CARCINOMA OF SKIN OF OTHER PARTS OF FACE: Status: ACTIVE | Noted: 2022-12-09

## 2022-12-09 PROCEDURE — 17311 MOHS 1 STAGE H/N/HF/G: CPT

## 2022-12-09 PROCEDURE — 14040 TIS TRNFR F/C/C/M/N/A/G/H/F: CPT

## 2022-12-09 PROCEDURE — ? MOHS SURGERY

## 2022-12-09 PROCEDURE — 17312 MOHS ADDL STAGE: CPT

## 2022-12-09 NOTE — PROCEDURE: MOHS SURGERY
Mohs Histo Method Verbiage: Each section was then chromacoded and processed in the Mohs lab using the Mohs protocol and submitted for frozen section.
Tarsorrhaphy Performed?: No
Full Thickness Lip Wedge Repair (Flap) Text: Given the location of the defect and the proximity to free margins a full thickness wedge repair was deemed most appropriate.  Using a sterile surgical marker, the appropriate repair was drawn incorporating the defect and placing the expected incisions perpendicular to the vermilion border.  The vermilion border was also meticulously outlined to ensure appropriate reapproximation during the repair.  The area thus outlined was incised through and through with a #15 scalpel blade.  The muscularis and dermis were reaproximated with deep sutures following hemostasis. Care was taken to realign the vermilion border before proceeding with the superficial closure.  Once the vermilion was realigned the superfical and mucosal closure was finished.
Consent (Ear)/Introductory Paragraph: The rationale for Mohs was explained to the patient and consent was obtained. The risks, benefits and alternatives to therapy were discussed in detail. Specifically, the risks of ear deformity, infection, scarring, bleeding, prolonged wound healing, incomplete removal, allergy to anesthesia, nerve injury and recurrence were addressed. Prior to the procedure, the treatment site was clearly identified and confirmed by the patient. All components of Universal Protocol/PAUSE Rule completed.
Special Stains Stage 14 - Results: Base On Clearance Noted Above
Quadrants Reporting?: 0
Consent (Spinal Accessory)/Introductory Paragraph: The rationale for Mohs was explained to the patient and consent was obtained. The risks, benefits and alternatives to therapy were discussed in detail. Specifically, the risks of damage to the spinal accessory nerve, infection, scarring, bleeding, prolonged wound healing, incomplete removal, allergy to anesthesia, and recurrence were addressed. Prior to the procedure, the treatment site was clearly identified and confirmed by the patient. All components of Universal Protocol/PAUSE Rule completed.
Burow's Advancement Flap Text: The defect edges were debeveled with a #15 scalpel blade.  Given the location of the defect and the proximity to free margins a Burow's advancement flap was deemed most appropriate.  Using a sterile surgical marker, the appropriate advancement flap was drawn incorporating the defect and placing the expected incisions within the relaxed skin tension lines where possible.    The area thus outlined was incised deep to adipose tissue with a #15 scalpel blade.  The skin margins were undermined to an appropriate distance in all directions utilizing iris scissors.
X Size Of Lesion In Cm (Optional): 0.5
Dorsal Nasal Flap Text: The defect edges were debeveled with a #15 scalpel blade.  Given the location of the defect and the proximity to free margins a dorsal nasal flap was deemed most appropriate.  Using a sterile surgical marker, an appropriate dorsal nasal flap was drawn around the defect.    The area thus outlined was incised deep to adipose tissue with a #15 scalpel blade.  The skin margins were undermined to an appropriate distance in all directions utilizing iris scissors.
Referred To Asc For Closure Text (Leave Blank If You Do Not Want): After obtaining clear surgical margins the patient was sent to an ASC for surgical repair.  The patient understands they will receive post-surgical care and follow-up from the ASC physician.
Stage 11: Additional Anesthesia Type: 1% lidocaine with epinephrine
Tumor Depth: Less than 6mm from granular layer and no invasion beyond the subcutaneous fat
Incorporate Mauc Into Note After Indications: Yes
Surgeon/Pathologist Verbiage (Will Incorporate Name Of Surgeon From Intro If Not Blank): operated in two distinct and integrated capacities as the surgeon and pathologist.
Advancement Flap (Single) Text: The defect edges were debeveled with a #15 scalpel blade.  Given the location of the defect and the proximity to free margins a single advancement flap was deemed most appropriate.  Using a sterile surgical marker, an appropriate advancement flap was drawn incorporating the defect and placing the expected incisions within the relaxed skin tension lines where possible.    The area thus outlined was incised deep to adipose tissue with a #15 scalpel blade.  The skin margins were undermined to an appropriate distance in all directions utilizing iris scissors.
Estlander Flap (Upper To Lower Lip) Text: The defect of the lower lip was assessed and measured.  Given the location and size of the defect, an Estlander flap was deemed most appropriate.  Using a sterile surgical marker, an appropriate Estlander flap was measured and drawn on the upper lip. Local anesthesia was then infiltrated. A scalpel was then used to incise the lateral aspect of the flap, through skin, muscle and mucosa, leaving the flap pedicled medially.  The flap was then rotated and positioned to fill the lower lip defect.  The flap was then sutured into place with a three layer technique, closing the orbicularis oris muscle layer with subcutaneous buried sutures, followed by a mucosal layer and an epidermal layer.
Hatchet Flap Text: The defect edges were debeveled with a #15 scalpel blade.  Given the location of the defect, shape of the defect and the proximity to free margins a hatchet flap was deemed most appropriate.  Using a sterile surgical marker, an appropriate hatchet flap was drawn incorporating the defect and placing the expected incisions within the relaxed skin tension lines where possible.    The area thus outlined was incised deep to adipose tissue with a #15 scalpel blade.  The skin margins were undermined to an appropriate distance in all directions utilizing iris scissors.
Graft Donor Site Bandage (Optional-Leave Blank If You Don't Want In Note): Aquaplast was fitted to the graft site and sewn into place. A pressure bandage were applied to the donor site and over the aquaplast bolster.
Information: Selecting Yes will display possible errors in your note based on the variables you have selected. This validation is only offered as a suggestion for you. PLEASE NOTE THAT THE VALIDATION TEXT WILL BE REMOVED WHEN YOU FINALIZE YOUR NOTE. IF YOU WANT TO FAX A PRELIMINARY NOTE YOU WILL NEED TO TOGGLE THIS TO 'NO' IF YOU DO NOT WANT IT IN YOUR FAXED NOTE.
Ear Star Wedge Flap Text: The defect edges were debeveled with a #15 blade scalpel.  Given the location of the defect and the proximity to free margins (helical rim) an ear star wedge flap was deemed most appropriate.  Using a sterile surgical marker, the appropriate flap was drawn incorporating the defect and placing the expected incisions between the helical rim and antihelix where possible.  The area thus outlined was incised through and through with a #15 scalpel blade.
Bilobed Flap Text: The defect edges were debeveled with a #15 scalpel blade.  Given the location of the defect and the proximity to free margins a bilobe flap was deemed most appropriate.  Using a sterile surgical marker, an appropriate bilobe flap drawn around the defect.    The area thus outlined was incised deep to adipose tissue with a #15 scalpel blade.  The skin margins were undermined to an appropriate distance in all directions utilizing iris scissors.
O-T Advancement Flap Text: The defect edges were debeveled with a #15 scalpel blade.  Given the location of the defect, shape of the defect and the proximity to free margins an O-T advancement flap was deemed most appropriate.  Using a sterile surgical marker, an appropriate advancement flap was drawn incorporating the defect and placing the expected incisions within the relaxed skin tension lines where possible.    The area thus outlined was incised deep to adipose tissue with a #15 scalpel blade.  The skin margins were undermined to an appropriate distance in all directions utilizing iris scissors.
Double O-Z Plasty Text: The defect edges were debeveled with a #15 scalpel blade.  Given the location of the defect, shape of the defect and the proximity to free margins a Double O-Z plasty (double transposition flap) was deemed most appropriate.  Using a sterile surgical marker, the appropriate transposition flaps were drawn incorporating the defect and placing the expected incisions within the relaxed skin tension lines where possible. The area thus outlined was incised deep to adipose tissue with a #15 scalpel blade.  The skin margins were undermined to an appropriate distance in all directions utilizing iris scissors.  Hemostasis was achieved with electrocautery.  The flaps were then transposed into place, one clockwise and the other counterclockwise, and anchored with interrupted buried subcutaneous sutures.
Donor Site Anesthesia Type: same as repair anesthesia
Rotation Flap Text: The defect edges were debeveled with a #15 scalpel blade.  Given the location of the defect, shape of the defect and the proximity to free margins a rotation flap was deemed most appropriate.  Using a sterile surgical marker, an appropriate rotation flap was drawn incorporating the defect and placing the expected incisions within the relaxed skin tension lines where possible.    The area thus outlined was incised deep to adipose tissue with a #15 scalpel blade.  The skin margins were undermined to an appropriate distance in all directions utilizing iris scissors.
Closure 2 Information: This tab is for additional flaps and grafts, including complex repair and grafts and complex repair and flaps. You can also specify a different location for the additional defect, if the location is the same you do not need to select a new one. We will insert the automated text for the repair you select below just as we do for solitary flaps and grafts. Please note that at this time if you select a location with a different insurance zone you will need to override the ICD10 and CPT if appropriate.
Mid-Level Procedure Text (F): After obtaining clear surgical margins the patient was sent to a mid-level provider for surgical repair.  The patient understands they will receive post-surgical care and follow-up from the mid-level provider.
Was The Patient On Physician Recommended Anticoagulation Therapy?: Please Select the Appropriate Response
Modified Advancement Flap Text: The defect edges were debeveled with a #15 scalpel blade.  Given the location of the defect, shape of the defect and the proximity to free margins a modified advancement flap was deemed most appropriate.  Using a sterile surgical marker, an appropriate advancement flap was drawn incorporating the defect and placing the expected incisions within the relaxed skin tension lines where possible.    The area thus outlined was incised deep to adipose tissue with a #15 scalpel blade.  The skin margins were undermined to an appropriate distance in all directions utilizing iris scissors.
Nostril Rim Text: The closure involved the nostril rim.
Oculoplastic Surgeon Procedure Text (D): After obtaining clear surgical margins the patient was sent to oculoplastics for surgical repair.  The patient understands they will receive post-surgical care and follow-up from the referring physician's office.
Number Of Hemigard Strips Per Side: 1
Island Pedicle Flap-Requiring Vessel Identification Text: The defect edges were debeveled with a #15 scalpel blade.  Given the location of the defect, shape of the defect and the proximity to free margins an island pedicle advancement flap was deemed most appropriate.  Using a sterile surgical marker, an appropriate advancement flap was drawn, based on the axial vessel mentioned above, incorporating the defect, outlining the appropriate donor tissue and placing the expected incisions within the relaxed skin tension lines where possible.    The area thus outlined was incised deep to adipose tissue with a #15 scalpel blade.  The skin margins were undermined to an appropriate distance in all directions around the primary defect and laterally outward around the island pedicle utilizing iris scissors.  There was minimal undermining beneath the pedicle flap.
Surgical Defect Length In Cm (Optional): 1.2
Post-Care Instructions: I reviewed with the patient in detail post-care instructions. Patient is not to engage in any heavy lifting, exercise, or swimming for the next 14 days. Should the patient develop any fevers, chills, bleeding, severe pain patient will contact the office immediately.
Manual Repair Warning Statement: We plan on removing the manually selected variable below in favor of our much easier automatic structured text blocks found in the previous tab. We decided to do this to help make the flow better and give you the full power of structured data. Manual selection is never going to be ideal in our platform and I would encourage you to avoid using manual selection from this point on, especially since I will be sunsetting this feature. It is important that you do one of two things with the customized text below. First, you can save all of the text in a word file so you can have it for future reference. Second, transfer the text to the appropriate area in the Library tab. Lastly, if there is a flap or graft type which we do not have you need to let us know right away so I can add it in before the variable is hidden. No need to panic, we plan to give you roughly 6 months to make the change.
Brow Lift Text: A midfrontal incision was made medially to the defect to allow access to the tissues just superior to the left eyebrow. Following careful dissection inferiorly in a supraperiosteal plane to the level of the left eyebrow, several 3-0 monocryl sutures were used to resuspend the eyebrow orbicularis oculi muscular unit to the superior frontal bone periosteum. This resulted in an appropriate reapproximation of static eyebrow symmetry and correction of the left brow ptosis.
Cheek Interpolation Flap Text: A decision was made to reconstruct the defect utilizing an interpolation axial flap and a staged reconstruction.  A telfa template was made of the defect.  This telfa template was then used to outline the Cheek Interpolation flap.  The donor area for the pedicle flap was then injected with anesthesia.  The flap was excised through the skin and subcutaneous tissue down to the layer of the underlying musculature.  The interpolation flap was carefully excised within this deep plane to maintain its blood supply.  The edges of the donor site were undermined.   The donor site was closed in a primary fashion.  The pedicle was then rotated into position and sutured.  Once the tube was sutured into place, adequate blood supply was confirmed with blanching and refill.  The pedicle was then wrapped with xeroform gauze and dressed appropriately with a telfa and gauze bandage to ensure continued blood supply and protect the attached pedicle.
Flap Type: Advancement Flap (Single)
Melolabial Interpolation Flap Text: A decision was made to reconstruct the defect utilizing an interpolation axial flap and a staged reconstruction.  A telfa template was made of the defect.  This telfa template was then used to outline the melolabial interpolation flap.  The donor area for the pedicle flap was then injected with anesthesia.  The flap was excised through the skin and subcutaneous tissue down to the layer of the underlying musculature.  The pedicle flap was carefully excised within this deep plane to maintain its blood supply.  The edges of the donor site were undermined.   The donor site was closed in a primary fashion.  The pedicle was then rotated into position and sutured.  Once the tube was sutured into place, adequate blood supply was confirmed with blanching and refill.  The pedicle was then wrapped with xeroform gauze and dressed appropriately with a telfa and gauze bandage to ensure continued blood supply and protect the attached pedicle.
Vermilion Border Text: The closure involved the vermilion border.
Deep Sutures: 5-0 Polysorb
Ftsg Text: The defect edges were debeveled with a #15 scalpel blade.  Given the location of the defect, shape of the defect and the proximity to free margins a full thickness skin graft was deemed most appropriate.  Using a sterile surgical marker, the primary defect shape was transferred to the donor site. The area thus outlined was incised deep to adipose tissue with a #15 scalpel blade.  The harvested graft was then trimmed of adipose tissue until only dermis and epidermis was left.  The skin margins of the secondary defect were undermined to an appropriate distance in all directions utilizing iris scissors.  The secondary defect was closed with interrupted buried subcutaneous sutures.  The skin edges were then re-apposed with running  sutures.  The skin graft was then placed in the primary defect and oriented appropriately.
Peng Advancement Flap Text: The defect edges were debeveled with a #15 scalpel blade.  Given the location of the defect, shape of the defect and the proximity to free margins a Peng advancement flap was deemed most appropriate.  Using a sterile surgical marker, an appropriate advancement flap was drawn incorporating the defect and placing the expected incisions within the relaxed skin tension lines where possible. The area thus outlined was incised deep to adipose tissue with a #15 scalpel blade.  The skin margins were undermined to an appropriate distance in all directions utilizing iris scissors.
Provider Procedure Text (E): After obtaining clear surgical margins the defect was repaired by another provider.
Repair Type: Flap
Split-Thickness Skin Graft Text: The defect edges were debeveled with a #15 scalpel blade.  Given the location of the defect, shape of the defect and the proximity to free margins a split thickness skin graft was deemed most appropriate.  Using a sterile surgical marker, the primary defect shape was transferred to the donor site. The split thickness graft was then harvested.  The skin graft was then placed in the primary defect and oriented appropriately.
Hemigard Intro: Due to skin fragility and wound tension, it was decided to use HEMIGARD adhesive retention suture devices to permit a linear closure. The skin was cleaned and dried for a 6cm distance away from the wound. Excessive hair, if present, was removed to allow for adhesion.
Closure 3 Information: This tab is for additional flaps and grafts above and beyond our usual structured repairs.  Please note if you enter information here it will not currently bill and you will need to add the billing information manually.
Eye Protection Verbiage: Before proceeding with the stage, a plastic scleral shield was inserted. The globe was anesthetized with a few drops of 1% lidocaine with 1:100,000 epinephrine. Then, an appropriate sized scleral shield was chosen and coated with lacrilube ointment. The shield was gently inserted and left in place for the duration of each stage. After the stage was completed, the shield was gently removed.
Staged Advancement Flap Text: The defect edges were debeveled with a #15 scalpel blade.  Given the location of the defect, shape of the defect and the proximity to free margins a staged advancement flap was deemed most appropriate.  Using a sterile surgical marker, an appropriate advancement flap was drawn incorporating the defect and placing the expected incisions within the relaxed skin tension lines where possible. The area thus outlined was incised deep to adipose tissue with a #15 scalpel blade.  The skin margins were undermined to an appropriate distance in all directions utilizing iris scissors.
M-Plasty Intermediate Repair Preamble Text (Leave Blank If You Do Not Want): Undermining was performed with blunt dissection.
Mohs Rapid Report Verbiage: The area of clinically evident tumor was marked with skin marking ink and appropriately hatched.  The initial incision was made following the Mohs approach through the skin.  The specimen was taken to the lab, divided into the necessary number of pieces, chromacoded and processed according to the Mohs protocol.  This was repeated in successive stages until a tumor free defect was achieved.
Otolaryngologist Procedure Text (B): After obtaining clear surgical margins the patient was sent to otolaryngology for surgical repair.  The patient understands they will receive post-surgical care and follow-up from the referring physician's office.
Previous Accession (Optional): [id number]
Detail Level: Detailed
Area L Indication Text: Tumors in this location are included in Area L (trunk and extremities).  Mohs surgery is indicated for larger tumors, or tumors with aggressive histologic features, in these anatomic locations.
Interpolation Flap Text: A decision was made to reconstruct the defect utilizing an interpolation axial flap and a staged reconstruction.  A telfa template was made of the defect.  This telfa template was then used to outline the interpolation flap.  The donor area for the pedicle flap was then injected with anesthesia.  The flap was excised through the skin and subcutaneous tissue down to the layer of the underlying musculature.  The interpolation flap was carefully excised within this deep plane to maintain its blood supply.  The edges of the donor site were undermined.   The donor site was closed in a primary fashion.  The pedicle was then rotated into position and sutured.  Once the tube was sutured into place, adequate blood supply was confirmed with blanching and refill.  The pedicle was then wrapped with xeroform gauze and dressed appropriately with a telfa and gauze bandage to ensure continued blood supply and protect the attached pedicle.
O-T Plasty Text: The defect edges were debeveled with a #15 scalpel blade.  Given the location of the defect, shape of the defect and the proximity to free margins an O-T plasty was deemed most appropriate.  Using a sterile surgical marker, an appropriate O-T plasty was drawn incorporating the defect and placing the expected incisions within the relaxed skin tension lines where possible.    The area thus outlined was incised deep to adipose tissue with a #15 scalpel blade.  The skin margins were undermined to an appropriate distance in all directions utilizing iris scissors.
Consent (Nose)/Introductory Paragraph: The rationale for Mohs was explained to the patient and consent was obtained. The risks, benefits and alternatives to therapy were discussed in detail. Specifically, the risks of nasal deformity, changes in the flow of air through the nose, infection, scarring, bleeding, prolonged wound healing, incomplete removal, allergy to anesthesia, nerve injury and recurrence were addressed. Prior to the procedure, the treatment site was clearly identified and confirmed by the patient. All components of Universal Protocol/PAUSE Rule completed.
Mercedes Flap Text: The defect edges were debeveled with a #15 scalpel blade.  Given the location of the defect, shape of the defect and the proximity to free margins a Mercedes flap was deemed most appropriate.  Using a sterile surgical marker, an appropriate advancement flap was drawn incorporating the defect and placing the expected incisions within the relaxed skin tension lines where possible. The area thus outlined was incised deep to adipose tissue with a #15 scalpel blade.  The skin margins were undermined to an appropriate distance in all directions utilizing iris scissors.
No Repair - Repaired With Adjacent Surgical Defect Text (Leave Blank If You Do Not Want): After obtaining clear surgical margins the defect was repaired concurrently with another surgical defect which was in close approximation.
Suture Removal: 14 days
Pain Refusal Text: I offered to prescribe pain medication but the patient refused to take this medication.
Double M-Plasty Complex Repair Preamble Text (Leave Blank If You Do Not Want): Extensive wide undermining was performed.
Adjacent Tissue Transfer Text: The defect edges were debeveled with a #15 scalpel blade.  Given the location of the defect and the proximity to free margins an adjacent tissue transfer was deemed most appropriate.  Using a sterile surgical marker, an appropriate flap was drawn incorporating the defect and placing the expected incisions within the relaxed skin tension lines where possible.    The area thus outlined was incised deep to adipose tissue with a #15 scalpel blade.  The skin margins were undermined to an appropriate distance in all directions utilizing iris scissors.
Unna Boot Text: An Unna boot was placed to help immobilize the limb and facilitate more rapid healing.
Purse String (Intermediate) Text: Given the location of the defect and the characteristics of the surrounding skin a purse string intermediate closure was deemed most appropriate.  Undermining was performed circumfirentially around the surgical defect.  A purse string suture was then placed and tightened.
Secondary Defect Length In Cm (Required For Flaps): 2
Undermining Type: Entire Wound
Rhombic Flap Text: The defect edges were debeveled with a #15 scalpel blade.  Given the location of the defect and the proximity to free margins a rhombic flap was deemed most appropriate.  Using a sterile surgical marker, an appropriate rhombic flap was drawn incorporating the defect.    The area thus outlined was incised deep to adipose tissue with a #15 scalpel blade.  The skin margins were undermined to an appropriate distance in all directions utilizing iris scissors.
Bilateral Helical Rim Advancement Flap Text: The defect edges were debeveled with a #15 blade scalpel.  Given the location of the defect and the proximity to free margins (helical rim) a bilateral helical rim advancement flap was deemed most appropriate.  Using a sterile surgical marker, the appropriate advancement flaps were drawn incorporating the defect and placing the expected incisions between the helical rim and antihelix where possible.  The area thus outlined was incised through and through with a #15 scalpel blade.  With a skin hook and iris scissors, the flaps were gently and sharply undermined and freed up.
Consent (Scalp)/Introductory Paragraph: The rationale for Mohs was explained to the patient and consent was obtained. The risks, benefits and alternatives to therapy were discussed in detail. Specifically, the risks of changes in hair growth pattern secondary to repair, infection, scarring, bleeding, prolonged wound healing, incomplete removal, allergy to anesthesia, nerve injury and recurrence were addressed. Prior to the procedure, the treatment site was clearly identified and confirmed by the patient. All components of Universal Protocol/PAUSE Rule completed.
Where Do You Want The Question To Include Opioid Counseling Located?: Case Summary Tab
Graft Donor Site Epidermal Sutures (Optional): 5-0 Surgipro
Tarsorrhaphy Text: A tarsorrhaphy was performed using Frost sutures.
Secondary Intention Text (Leave Blank If You Do Not Want): The defect will heal with secondary intention.
Crescentic Advancement Flap Text: The defect edges were debeveled with a #15 scalpel blade.  Given the location of the defect and the proximity to free margins a crescentic advancement flap was deemed most appropriate.  Using a sterile surgical marker, the appropriate advancement flap was drawn incorporating the defect and placing the expected incisions within the relaxed skin tension lines where possible.    The area thus outlined was incised deep to adipose tissue with a #15 scalpel blade.  The skin margins were undermined to an appropriate distance in all directions utilizing iris scissors.
Alternatives Discussed Intro (Do Not Add Period): I discussed alternative treatments to Mohs surgery and specifically discussed the risks and benefits of
Debridement Text: The wound edges were debrided prior to proceeding with the closure to facilitate wound healing.
Retention Suture Text: Retention sutures were placed to support the closure and prevent dehiscence.
Double O-Z Flap Text: The defect edges were debeveled with a #15 scalpel blade.  Given the location of the defect, shape of the defect and the proximity to free margins a Double O-Z flap was deemed most appropriate.  Using a sterile surgical marker, an appropriate transposition flap was drawn incorporating the defect and placing the expected incisions within the relaxed skin tension lines where possible. The area thus outlined was incised deep to adipose tissue with a #15 scalpel blade.  The skin margins were undermined to an appropriate distance in all directions utilizing iris scissors.
Wound Care (No Sutures): Petrolatum
Alar Island Pedicle Flap Text: The defect edges were debeveled with a #15 scalpel blade.  Given the location of the defect, shape of the defect and the proximity to the alar rim an island pedicle advancement flap was deemed most appropriate.  Using a sterile surgical marker, an appropriate advancement flap was drawn incorporating the defect, outlining the appropriate donor tissue and placing the expected incisions within the nasal ala running parallel to the alar rim. The area thus outlined was incised with a #15 scalpel blade.  The skin margins were undermined minimally to an appropriate distance in all directions around the primary defect and laterally outward around the island pedicle utilizing iris scissors.  There was minimal undermining beneath the pedicle flap.
Mastoid Interpolation Flap Text: A decision was made to reconstruct the defect utilizing an interpolation axial flap and a staged reconstruction.  A telfa template was made of the defect.  This telfa template was then used to outline the mastoid interpolation flap.  The donor area for the pedicle flap was then injected with anesthesia.  The flap was excised through the skin and subcutaneous tissue down to the layer of the underlying musculature.  The pedicle flap was carefully excised within this deep plane to maintain its blood supply.  The edges of the donor site were undermined.   The donor site was closed in a primary fashion.  The pedicle was then rotated into position and sutured.  Once the tube was sutured into place, adequate blood supply was confirmed with blanching and refill.  The pedicle was then wrapped with xeroform gauze and dressed appropriately with a telfa and gauze bandage to ensure continued blood supply and protect the attached pedicle.
Same Histology In Subsequent Stages Text: The pattern and morphology of the tumor is as described in the first stage.
Bcc Infiltrative Histology Text: There were numerous aggregates of basaloid cells demonstrating an infiltrative pattern.
Repair Anesthesia Method: local infiltration
Anesthesia Type: 2% Xylocaine with epinephrine and sodium bicarbonate
Consent 2/Introductory Paragraph: Mohs surgery was explained to the patient and consent was obtained. The risks, benefits and alternatives to therapy were discussed in detail. Specifically, the risks of infection, scarring, bleeding, prolonged wound healing, incomplete removal, allergy to anesthesia, nerve injury and recurrence were addressed. Prior to the procedure, the treatment site was clearly identified and confirmed by the patient. All components of Universal Protocol/PAUSE Rule completed.
Plastic Surgeon Procedure Text (A): After obtaining clear surgical margins the patient was sent to plastics for surgical repair.  The patient understands they will receive post-surgical care and follow-up from the referring physician's office.
Consent 3/Introductory Paragraph: I gave the patient a chance to ask questions they had about the procedure.  Following this I explained the Mohs procedure and consent was obtained. The risks, benefits and alternatives to therapy were discussed in detail. Specifically, the risks of infection, scarring, bleeding, prolonged wound healing, incomplete removal, allergy to anesthesia, nerve injury and recurrence were addressed. Prior to the procedure, the treatment site was clearly identified and confirmed by the patient. All components of Universal Protocol/PAUSE Rule completed.
Complex Repair And Flap Additional Text (Will Appearing After The Standard Complex Repair Text): The complex repair was not sufficient to completely close the primary defect. The remaining additional defect was repaired with the flap mentioned below.
Epidermal Autograft Text: The defect edges were debeveled with a #15 scalpel blade.  Given the location of the defect, shape of the defect and the proximity to free margins an epidermal autograft was deemed most appropriate.  Using a sterile surgical marker, the primary defect shape was transferred to the donor site. The epidermal graft was then harvested.  The skin graft was then placed in the primary defect and oriented appropriately.
V-Y Flap Text: The defect edges were debeveled with a #15 scalpel blade.  Given the location of the defect, shape of the defect and the proximity to free margins a V-Y flap was deemed most appropriate.  Using a sterile surgical marker, an appropriate advancement flap was drawn incorporating the defect and placing the expected incisions within the relaxed skin tension lines where possible.    The area thus outlined was incised deep to adipose tissue with a #15 scalpel blade.  The skin margins were undermined to an appropriate distance in all directions utilizing iris scissors.
Posterior Auricular Interpolation Flap Text: A decision was made to reconstruct the defect utilizing an interpolation axial flap and a staged reconstruction.  A telfa template was made of the defect.  This telfa template was then used to outline the posterior auricular interpolation flap.  The donor area for the pedicle flap was then injected with anesthesia.  The flap was excised through the skin and subcutaneous tissue down to the layer of the underlying musculature.  The pedicle flap was carefully excised within this deep plane to maintain its blood supply.  The edges of the donor site were undermined.   The donor site was closed in a primary fashion.  The pedicle was then rotated into position and sutured.  Once the tube was sutured into place, adequate blood supply was confirmed with blanching and refill.  The pedicle was then wrapped with xeroform gauze and dressed appropriately with a telfa and gauze bandage to ensure continued blood supply and protect the attached pedicle.
Non-Graft Cartilage Fenestration Text: The cartilage was fenestrated with a 2mm punch biopsy to help facilitate healing.
Graft Cartilage Fenestration Text: The cartilage was fenestrated with a 2mm punch biopsy to help facilitate graft survival and healing.
Mustarde Flap Text: The defect edges were debeveled with a #15 scalpel blade.  Given the size, depth and location of the defect and the proximity to free margins a Mustarde flap was deemed most appropriate.  Using a sterile surgical marker, an appropriate flap was drawn incorporating the defect. The area thus outlined was incised with a #15 scalpel blade.  The skin margins were undermined to an appropriate distance in all directions utilizing iris scissors.
Spiral Flap Text: The defect edges were debeveled with a #15 scalpel blade.  Given the location of the defect, shape of the defect and the proximity to free margins a spiral flap was deemed most appropriate.  Using a sterile surgical marker, an appropriate rotation flap was drawn incorporating the defect and placing the expected incisions within the relaxed skin tension lines where possible. The area thus outlined was incised deep to adipose tissue with a #15 scalpel blade.  The skin margins were undermined to an appropriate distance in all directions utilizing iris scissors.
Estimated Blood Loss (Cc): minimal
Mauc Instructions: By selecting yes to the question below the MAUC number will be added into the note.  This will be calculated automatically based on the diagnosis chosen, the size entered, the body zone selected (H,M,L) and the specific indications you chose. You will also have the option to override the Mohs AUC if you disagree with the automatically calculated number and this option is found in the Case Summary tab.
Nasal Turnover Hinge Flap Text: The defect edges were debeveled with a #15 scalpel blade.  Given the size, depth, location of the defect and the defect being full thickness a nasal turnover hinge flap was deemed most appropriate.  Using a sterile surgical marker, an appropriate hinge flap was drawn incorporating the defect. The area thus outlined was incised with a #15 scalpel blade. The flap was designed to recreate the nasal mucosal lining and the alar rim. The skin margins were undermined to an appropriate distance in all directions utilizing iris scissors.
Referring Physician (Optional): VALDEZ Smallwood
Star Wedge Flap Text: The defect edges were debeveled with a #15 scalpel blade.  Given the location of the defect, shape of the defect and the proximity to free margins a star wedge flap was deemed most appropriate.  Using a sterile surgical marker, an appropriate rotation flap was drawn incorporating the defect and placing the expected incisions within the relaxed skin tension lines where possible. The area thus outlined was incised deep to adipose tissue with a #15 scalpel blade.  The skin margins were undermined to an appropriate distance in all directions utilizing iris scissors.
Bi-Rhombic Flap Text: The defect edges were debeveled with a #15 scalpel blade.  Given the location of the defect and the proximity to free margins a bi-rhombic flap was deemed most appropriate.  Using a sterile surgical marker, an appropriate rhombic flap was drawn incorporating the defect. The area thus outlined was incised deep to adipose tissue with a #15 scalpel blade.  The skin margins were undermined to an appropriate distance in all directions utilizing iris scissors.
Zygomaticofacial Flap Text: Given the location of the defect, shape of the defect and the proximity to free margins a zygomaticofacial flap was deemed most appropriate for repair.  Using a sterile surgical marker, the appropriate flap was drawn incorporating the defect and placing the expected incisions within the relaxed skin tension lines where possible. The area thus outlined was incised deep to adipose tissue with a #15 scalpel blade with preservation of a vascular pedicle.  The skin margins were undermined to an appropriate distance in all directions utilizing iris scissors.  The flap was then placed into the defect and anchored with interrupted buried subcutaneous sutures.
Postop Diagnosis: same
Advancement-Rotation Flap Text: The defect edges were debeveled with a #15 scalpel blade.  Given the location of the defect, shape of the defect and the proximity to free margins an advancement-rotation flap was deemed most appropriate.  Using a sterile surgical marker, an appropriate flap was drawn incorporating the defect and placing the expected incisions within the relaxed skin tension lines where possible. The area thus outlined was incised deep to adipose tissue with a #15 scalpel blade.  The skin margins were undermined to an appropriate distance in all directions utilizing iris scissors.
Cartilage Graft Text: The defect edges were debeveled with a #15 scalpel blade.  Given the location of the defect, shape of the defect, the fact the defect involved a full thickness cartilage defect a cartilage graft was deemed most appropriate.  An appropriate donor site was identified, cleansed, and anesthetized. The cartilage graft was then harvested and transferred to the recipient site, oriented appropriately and then sutured into place.  The secondary defect was then repaired using a primary closure.
A-T Advancement Flap Text: The defect edges were debeveled with a #15 scalpel blade.  Given the location of the defect, shape of the defect and the proximity to free margins an A-T advancement flap was deemed most appropriate.  Using a sterile surgical marker, an appropriate advancement flap was drawn incorporating the defect and placing the expected incisions within the relaxed skin tension lines where possible.    The area thus outlined was incised deep to adipose tissue with a #15 scalpel blade.  The skin margins were undermined to an appropriate distance in all directions utilizing iris scissors.
Dressing (No Sutures): pressure dressing with telfa
Initial Size Of Lesion: 0.6
No Residual Tumor Seen Histology Text: There were no malignant cells seen in the sections examined.
Epidermal Closure: simple interrupted
Home Suture Removal Text: Patient was provided instructions on removing sutures and will remove their sutures at home.  If they have any questions or difficulties they will call the office.
Secondary Defect Width In Cm (Required For Flaps): 2.5
Keystone Flap Text: The defect edges were debeveled with a #15 scalpel blade.  Given the location of the defect, shape of the defect a keystone flap was deemed most appropriate.  Using a sterile surgical marker, an appropriate keystone flap was drawn incorporating the defect, outlining the appropriate donor tissue and placing the expected incisions within the relaxed skin tension lines where possible. The area thus outlined was incised deep to adipose tissue with a #15 scalpel blade.  The skin margins were undermined to an appropriate distance in all directions around the primary defect and laterally outward around the flap utilizing iris scissors.
Consent Type: Consent 1 (Standard)
Repair Hemostasis (Optional): Pinpoint electrocautery
Surgeon Performing Repair (Optional): Bebeto Valles MD
Staging Info: By selecting yes to the question above you will include information on AJCC 8 tumor staging in your Mohs note. Information on tumor staging will be automatically added for SCCs on the head and neck. AJCC 8 includes tumor size, tumor depth, perineural involvement and bone invasion.
Suturegard Retention Suture: 0-0 Nylon
Epidermal Closure Graft Donor Site (Optional): running
O-Z Plasty Text: The defect edges were debeveled with a #15 scalpel blade.  Given the location of the defect, shape of the defect and the proximity to free margins an O-Z plasty (double transposition flap) was deemed most appropriate.  Using a sterile surgical marker, the appropriate transposition flaps were drawn incorporating the defect and placing the expected incisions within the relaxed skin tension lines where possible.    The area thus outlined was incised deep to adipose tissue with a #15 scalpel blade.  The skin margins were undermined to an appropriate distance in all directions utilizing iris scissors.  Hemostasis was achieved with electrocautery.  The flaps were then transposed into place, one clockwise and the other counterclockwise, and anchored with interrupted buried subcutaneous sutures.
Hemigard Postcare Instructions: The HEMIGARD strips are to remain completely dry for at least 5-7 days.
Dermal Autograft Text: The defect edges were debeveled with a #15 scalpel blade.  Given the location of the defect, shape of the defect and the proximity to free margins a dermal autograft was deemed most appropriate.  Using a sterile surgical marker, the primary defect shape was transferred to the donor site. The area thus outlined was incised deep to adipose tissue with a #15 scalpel blade.  The harvested graft was then trimmed of adipose and epidermal tissue until only dermis was left.  The skin graft was then placed in the primary defect and oriented appropriately.
Island Pedicle Flap Text: The defect edges were debeveled with a #15 scalpel blade.  Given the location of the defect, shape of the defect and the proximity to free margins an island pedicle advancement flap was deemed most appropriate.  Using a sterile surgical marker, an appropriate advancement flap was drawn incorporating the defect, outlining the appropriate donor tissue and placing the expected incisions within the relaxed skin tension lines where possible.    The area thus outlined was incised deep to adipose tissue with a #15 scalpel blade.  The skin margins were undermined to an appropriate distance in all directions around the primary defect and laterally outward around the island pedicle utilizing iris scissors.  There was minimal undermining beneath the pedicle flap.
Cheiloplasty (Complex) Text: A decision was made to reconstruct the defect with a  cheiloplasty.  The defect was undermined extensively.  Additional obicularis oris muscle was excised with a 15 blade scalpel.  The defect was converted into a full thickness wedge to facilite a better cosmetic result.  Small vessels were then tied off with 5-0 monocyrl. The obicularis oris, superficial fascia, adipose and dermis were then reapproximated.  After the deeper layers were approximated the epidermis was reapproximated with particular care given to realign the vermilion border.
Paramedian Forehead Flap Text: A decision was made to reconstruct the defect utilizing an interpolation axial flap and a staged reconstruction.  A telfa template was made of the defect.  This telfa template was then used to outline the paramedian forehead pedicle flap.  The donor area for the pedicle flap was then injected with anesthesia.  The flap was excised through the skin and subcutaneous tissue down to the layer of the underlying musculature.  The pedicle flap was carefully excised within this deep plane to maintain its blood supply.  The edges of the donor site were undermined.   The donor site was closed in a primary fashion.  The pedicle was then rotated into position and sutured.  Once the tube was sutured into place, adequate blood supply was confirmed with blanching and refill.  The pedicle was then wrapped with xeroform gauze and dressed appropriately with a telfa and gauze bandage to ensure continued blood supply and protect the attached pedicle.
Inflammation Suggestive Of Cancer Camouflage Histology Text: There was a dense lymphocytic infiltrate which prevented adequate histologic evaluation of adjacent structures.
Purse String (Simple) Text: Given the location of the defect and the characteristics of the surrounding skin a purse string closure was deemed most appropriate.  Undermining was performed circumfirentially around the surgical defect.  A purse string suture was then placed and tightened.
Localized Dermabrasion With Wire Brush Text: The patient was draped in routine manner.  Localized dermabrasion using 3 x 17 mm wire brush was performed in routine manner to papillary dermis. This spot dermabrasion is being performed to complete skin cancer reconstruction. It also will eliminate the other sun damaged precancerous cells that are known to be part of the regional effect of a lifetime's worth of sun exposure. This localized dermabrasion is therapeutic and should not be considered cosmetic in any regard.
Burow's Graft Text: The defect edges were debeveled with a #15 scalpel blade.  Given the location of the defect, shape of the defect, the proximity to free margins and the presence of a standing cone deformity a Burow's skin graft was deemed most appropriate. The standing cone was removed and this tissue was then trimmed to the shape of the primary defect. The adipose tissue was also removed until only dermis and epidermis were left.  The skin margins of the secondary defect were undermined to an appropriate distance in all directions utilizing iris scissors.  The secondary defect was closed with interrupted buried subcutaneous sutures.  The skin edges were then re-apposed with running  sutures.  The skin graft was then placed in the primary defect and oriented appropriately.
Double Island Pedicle Flap Text: The defect edges were debeveled with a #15 scalpel blade.  Given the location of the defect, shape of the defect and the proximity to free margins a double island pedicle advancement flap was deemed most appropriate.  Using a sterile surgical marker, an appropriate advancement flap was drawn incorporating the defect, outlining the appropriate donor tissue and placing the expected incisions within the relaxed skin tension lines where possible.    The area thus outlined was incised deep to adipose tissue with a #15 scalpel blade.  The skin margins were undermined to an appropriate distance in all directions around the primary defect and laterally outward around the island pedicle utilizing iris scissors.  There was minimal undermining beneath the pedicle flap.
Advancement Flap (Double) Text: The defect edges were debeveled with a #15 scalpel blade.  Given the location of the defect and the proximity to free margins a double advancement flap was deemed most appropriate.  Using a sterile surgical marker, the appropriate advancement flaps were drawn incorporating the defect and placing the expected incisions within the relaxed skin tension lines where possible.    The area thus outlined was incised deep to adipose tissue with a #15 scalpel blade.  The skin margins were undermined to an appropriate distance in all directions utilizing iris scissors.
Island Pedicle Flap With Canthal Suspension Text: The defect edges were debeveled with a #15 scalpel blade.  Given the location of the defect, shape of the defect and the proximity to free margins an island pedicle advancement flap was deemed most appropriate.  Using a sterile surgical marker, an appropriate advancement flap was drawn incorporating the defect, outlining the appropriate donor tissue and placing the expected incisions within the relaxed skin tension lines where possible. The area thus outlined was incised deep to adipose tissue with a #15 scalpel blade.  The skin margins were undermined to an appropriate distance in all directions around the primary defect and laterally outward around the island pedicle utilizing iris scissors.  There was minimal undermining beneath the pedicle flap. A suspension suture was placed in the canthal tendon to prevent tension and prevent ectropion.
O-Z Flap Text: The defect edges were debeveled with a #15 scalpel blade.  Given the location of the defect, shape of the defect and the proximity to free margins an O-Z flap was deemed most appropriate.  Using a sterile surgical marker, an appropriate transposition flap was drawn incorporating the defect and placing the expected incisions within the relaxed skin tension lines where possible. The area thus outlined was incised deep to adipose tissue with a #15 scalpel blade.  The skin margins were undermined to an appropriate distance in all directions utilizing iris scissors.
V-Y Plasty Text: The defect edges were debeveled with a #15 scalpel blade.  Given the location of the defect, shape of the defect and the proximity to free margins an V-Y advancement flap was deemed most appropriate.  Using a sterile surgical marker, an appropriate advancement flap was drawn incorporating the defect and placing the expected incisions within the relaxed skin tension lines where possible.    The area thus outlined was incised deep to adipose tissue with a #15 scalpel blade.  The skin margins were undermined to an appropriate distance in all directions utilizing iris scissors.
Mart-1 - Positive Histology Text: MART-1 staining demonstrates areas of higher density and clustering of melanocytes with Pagetoid spread upwards within the epidermis. The surgical margins are positive for tumor cells.
Cheek-To-Nose Interpolation Flap Text: A decision was made to reconstruct the defect utilizing an interpolation axial flap and a staged reconstruction.  A telfa template was made of the defect.  This telfa template was then used to outline the Cheek-To-Nose Interpolation flap.  The donor area for the pedicle flap was then injected with anesthesia.  The flap was excised through the skin and subcutaneous tissue down to the layer of the underlying musculature.  The interpolation flap was carefully excised within this deep plane to maintain its blood supply.  The edges of the donor site were undermined.   The donor site was closed in a primary fashion.  The pedicle was then rotated into position and sutured.  Once the tube was sutured into place, adequate blood supply was confirmed with blanching and refill.  The pedicle was then wrapped with xeroform gauze and dressed appropriately with a telfa and gauze bandage to ensure continued blood supply and protect the attached pedicle.
Partial Purse String (Intermediate) Text: Given the location of the defect and the characteristics of the surrounding skin an intermediate purse string closure was deemed most appropriate.  Undermining was performed circumfirentially around the surgical defect.  A purse string suture was then placed and tightened. Wound tension only allowed a partial closure of the circular defect.
Partial Purse String (Simple) Text: Given the location of the defect and the characteristics of the surrounding skin a simple purse string closure was deemed most appropriate.  Undermining was performed circumfirentially around the surgical defect.  A purse string suture was then placed and tightened. Wound tension only allowed a partial closure of the circular defect.
Mohs Case Number: [id number]
Tissue Cultured Epidermal Autograft Text: The defect edges were debeveled with a #15 scalpel blade.  Given the location of the defect, shape of the defect and the proximity to free margins a tissue cultured epidermal autograft was deemed most appropriate.  The graft was then trimmed to fit the size of the defect.  The graft was then placed in the primary defect and oriented appropriately.
Consent (Lip)/Introductory Paragraph: The rationale for Mohs was explained to the patient and consent was obtained. The risks, benefits and alternatives to therapy were discussed in detail. Specifically, the risks of lip deformity, changes in the oral aperture, infection, scarring, bleeding, prolonged wound healing, incomplete removal, allergy to anesthesia, nerve injury and recurrence were addressed. Prior to the procedure, the treatment site was clearly identified and confirmed by the patient. All components of Universal Protocol/PAUSE Rule completed.
Wound Care: Vaseline
Helical Rim Advancement Flap Text: The defect edges were debeveled with a #15 blade scalpel.  Given the location of the defect and the proximity to free margins (helical rim) a double helical rim advancement flap was deemed most appropriate.  Using a sterile surgical marker, the appropriate advancement flaps were drawn incorporating the defect and placing the expected incisions between the helical rim and antihelix where possible.  The area thus outlined was incised through and through with a #15 scalpel blade.  With a skin hook and iris scissors, the flaps were gently and sharply undermined and freed up.
W Plasty Text: The lesion was extirpated to the level of the fat with a #15 scalpel blade.  Given the location of the defect, shape of the defect and the proximity to free margins a W-plasty was deemed most appropriate for repair.  Using a sterile surgical marker, the appropriate transposition arms of the W-plasty were drawn incorporating the defect and placing the expected incisions within the relaxed skin tension lines where possible.    The area thus outlined was incised deep to adipose tissue with a #15 scalpel blade.  The skin margins were undermined to an appropriate distance in all directions utilizing iris scissors.  The opposing transposition arms were then transposed into place in opposite direction and anchored with interrupted buried subcutaneous sutures.
Suturegard Intro: Intraoperative tissue expansion was performed, utilizing the SUTUREGARD device, in order to reduce wound tension.
Melolabial Transposition Flap Text: The defect edges were debeveled with a #15 scalpel blade.  Given the location of the defect and the proximity to free margins a melolabial flap was deemed most appropriate.  Using a sterile surgical marker, an appropriate melolabial transposition flap was drawn incorporating the defect.    The area thus outlined was incised deep to adipose tissue with a #15 scalpel blade.  The skin margins were undermined to an appropriate distance in all directions utilizing iris scissors.
Skin Substitute Text: The defect edges were debeveled with a #15 scalpel blade.  Given the location of the defect, shape of the defect and the proximity to free margins a skin substitute graft was deemed most appropriate.  The graft material was trimmed to fit the size of the defect. The graft was then placed in the primary defect and oriented appropriately.
Rhomboid Transposition Flap Text: The defect edges were debeveled with a #15 scalpel blade.  Given the location of the defect and the proximity to free margins a rhomboid transposition flap was deemed most appropriate.  Using a sterile surgical marker, an appropriate rhomboid flap was drawn incorporating the defect.    The area thus outlined was incised deep to adipose tissue with a #15 scalpel blade.  The skin margins were undermined to an appropriate distance in all directions utilizing iris scissors.
Mart-1 - Negative Histology Text: MART-1 staining demonstrates a normal density and pattern of melanocytes along the dermal-epidermal junction. The surgical margins are negative for tumor cells.
Abbe Flap (Lower To Upper Lip) Text: The defect of the upper lip was assessed and measured.  Given the location and size of the defect, an Abbe flap was deemed most appropriate.  Using a sterile surgical marker, an appropriate Abbe flap was measured and drawn on the lower lip. Local anesthesia was then infiltrated. A scalpel was then used to incise the upper lip through and through the skin, vermilion, muscle and mucosa, leaving the flap pedicled on the opposite side.  The flap was then rotated and transferred to the lower lip defect.  The flap was then sutured into place with a three layer technique, closing the orbicularis oris muscle layer with subcutaneous buried sutures, followed by a mucosal layer and an epidermal layer.
Retention Suture Bite Size: 1 mm
Mucosal Advancement Flap Text: Given the location of the defect, shape of the defect and the proximity to free margins a mucosal advancement flap was deemed most appropriate. Incisions were made with a 15 blade scalpel in the appropriate fashion along the cutaneous vermilion border and the mucosal lip. The remaining actinically damaged mucosal tissue was excised.  The mucosal advancement flap was then elevated to the gingival sulcus with care taken to preserve the neurovascular structures and advanced into the primary defect. Care was taken to ensure that precise realignment of the vermilion border was achieved.
Ear Wedge Repair Text: A wedge excision was completed by carrying down an excision through the full thickness of the ear and cartilage with an inward facing Burow's triangle. The wound was then closed in a layered fashion.
Nasalis-Muscle-Based Myocutaneous Island Pedicle Flap Text: Using a #15 blade, an incision was made around the donor flap to the level of the nasalis muscle. Wide lateral undermining was then performed in both the subcutaneous plane above the nasalis muscle, and in a submuscular plane just above periosteum. This allowed the formation of a free nasalis muscle axial pedicle (based on the angular artery) which was still attached to the actual cutaneous flap, increasing its mobility and vascular viability. Hemostasis was obtained with pinpoint electrocoagulation. The flap was mobilized into position and the pivotal anchor points positioned and stabilized with buried interrupted sutures. Subcutaneous and dermal tissues were closed in a multilayered fashion with sutures. Tissue redundancies were excised, and the epidermal edges were apposed without significant tension and sutured with sutures.
Area M Indication Text: Tumors in this location are included in Area M (cheek, forehead, scalp, neck, jawline and pretibial skin).  Mohs surgery is indicated for tumors in these anatomic locations.
Consent (Temporal Branch)/Introductory Paragraph: The rationale for Mohs was explained to the patient and consent was obtained. The risks, benefits and alternatives to therapy were discussed in detail. Specifically, the risks of damage to the temporal branch of the facial nerve, infection, scarring, bleeding, prolonged wound healing, incomplete removal, allergy to anesthesia, and recurrence were addressed. Prior to the procedure, the treatment site was clearly identified and confirmed by the patient. All components of Universal Protocol/PAUSE Rule completed.
Banner Transposition Flap Text: The defect edges were debeveled with a #15 scalpel blade.  Given the location of the defect and the proximity to free margins a Banner transposition flap was deemed most appropriate.  Using a sterile surgical marker, an appropriate flap drawn around the defect. The area thus outlined was incised deep to adipose tissue with a #15 scalpel blade.  The skin margins were undermined to an appropriate distance in all directions utilizing iris scissors.
Bilobed Transposition Flap Text: The defect edges were debeveled with a #15 scalpel blade.  Given the location of the defect and the proximity to free margins a bilobed transposition flap was deemed most appropriate.  Using a sterile surgical marker, an appropriate bilobe flap drawn around the defect.    The area thus outlined was incised deep to adipose tissue with a #15 scalpel blade.  The skin margins were undermined to an appropriate distance in all directions utilizing iris scissors.
Location Indication Override (Is Already Calculated Based On Selected Body Location): Area H
Transposition Flap Text: The defect edges were debeveled with a #15 scalpel blade.  Given the location of the defect and the proximity to free margins a transposition flap was deemed most appropriate.  Using a sterile surgical marker, an appropriate transposition flap was drawn incorporating the defect.    The area thus outlined was incised deep to adipose tissue with a #15 scalpel blade.  The skin margins were undermined to an appropriate distance in all directions utilizing iris scissors.
Subsequent Stages Histo Method Verbiage: Using a similar technique to that described above, a thin layer of tissue was removed from all areas where tumor was visible on the previous stage.  The tissue was again oriented, mapped, dyed, and processed as above.
Helical Rim Text: The closure involved the helical rim.
Area H Indication Text: Tumors in this location are included in Area H (eyelids, eyebrows, nose, lips, chin, ear, pre-auricular, post-auricular, temple, genitalia, hands, feet, ankles and areola).  Tissue conservation is critical in these anatomic locations.
Trilobed Flap Text: The defect edges were debeveled with a #15 scalpel blade.  Given the location of the defect and the proximity to free margins a trilobed flap was deemed most appropriate.  Using a sterile surgical marker, an appropriate trilobed flap drawn around the defect.    The area thus outlined was incised deep to adipose tissue with a #15 scalpel blade.  The skin margins were undermined to an appropriate distance in all directions utilizing iris scissors.
Consent 1/Introductory Paragraph: The rationale for Mohs was explained to the patient and consent was obtained. The risks, benefits and alternatives to therapy were discussed in detail. Specifically, the risks of infection, scarring, bleeding, prolonged wound healing, incomplete removal, allergy to anesthesia, nerve injury and recurrence were addressed. Prior to the procedure, the treatment site was clearly identified and confirmed by the patient. All components of Universal Protocol/PAUSE Rule completed.
Orbicularis Oris Muscle Flap Text: The defect edges were debeveled with a #15 scalpel blade.  Given that the defect affected the competency of the oral sphincter an orbicularis oris muscle flap was deemed most appropriate to restore this competency and normal muscle function.  Using a sterile surgical marker, an appropriate flap was drawn incorporating the defect. The area thus outlined was incised with a #15 scalpel blade.
H Plasty Text: Given the location of the defect, shape of the defect and the proximity to free margins a H-plasty was deemed most appropriate for repair.  Using a sterile surgical marker, the appropriate advancement arms of the H-plasty were drawn incorporating the defect and placing the expected incisions within the relaxed skin tension lines where possible. The area thus outlined was incised deep to adipose tissue with a #15 scalpel blade. The skin margins were undermined to an appropriate distance in all directions utilizing iris scissors.  The opposing advancement arms were then advanced into place in opposite direction and anchored with interrupted buried subcutaneous sutures.
O-L Flap Text: The defect edges were debeveled with a #15 scalpel blade.  Given the location of the defect, shape of the defect and the proximity to free margins an O-L flap was deemed most appropriate.  Using a sterile surgical marker, an appropriate advancement flap was drawn incorporating the defect and placing the expected incisions within the relaxed skin tension lines where possible.    The area thus outlined was incised deep to adipose tissue with a #15 scalpel blade.  The skin margins were undermined to an appropriate distance in all directions utilizing iris scissors.
Mohs Method Verbiage: An incision at a 45 degree angle following the standard Mohs approach was done and the specimen was harvested as a microscopic controlled layer.
Z Plasty Text: The lesion was extirpated to the level of the fat with a #15 scalpel blade.  Given the location of the defect, shape of the defect and the proximity to free margins a Z-plasty was deemed most appropriate for repair.  Using a sterile surgical marker, the appropriate transposition arms of the Z-plasty were drawn incorporating the defect and placing the expected incisions within the relaxed skin tension lines where possible.    The area thus outlined was incised deep to adipose tissue with a #15 scalpel blade.  The skin margins were undermined to an appropriate distance in all directions utilizing iris scissors.  The opposing transposition arms were then transposed into place in opposite direction and anchored with interrupted buried subcutaneous sutures.
Medical Necessity Statement: Based on my medical judgement, Mohs surgery is the most appropriate treatment for this cancer compared to other treatments.
Depth Of Tumor Invasion (For Histology): dermis
Undermining Location (Optional): in the superficial subcutaneous fat
Chonodrocutaneous Helical Advancement Flap Text: The defect edges were debeveled with a #15 scalpel blade.  Given the location of the defect and the proximity to free margins a chondrocutaneous helical advancement flap was deemed most appropriate.  Using a sterile surgical marker, the appropriate advancement flap was drawn incorporating the defect and placing the expected incisions within the relaxed skin tension lines where possible.    The area thus outlined was incised deep to adipose tissue with a #15 scalpel blade.  The skin margins were undermined to an appropriate distance in all directions utilizing iris scissors.
S Plasty Text: Given the location and shape of the defect, and the orientation of relaxed skin tension lines, an S-plasty was deemed most appropriate for repair.  Using a sterile surgical marker, the appropriate outline of the S-plasty was drawn, incorporating the defect and placing the expected incisions within the relaxed skin tension lines where possible.  The area thus outlined was incised deep to adipose tissue with a #15 scalpel blade.  The skin margins were undermined to an appropriate distance in all directions utilizing iris scissors. The skin flaps were advanced over the defect.  The opposing margins were then approximated with interrupted buried subcutaneous sutures.
Suturegard Body: The suture ends were repeatedly re-tightened and re-clamped to achieve the desired tissue expansion.
Complex Repair And Graft Additional Text (Will Appearing After The Standard Complex Repair Text): The complex repair was not sufficient to completely close the primary defect. The remaining additional defect was repaired with the graft mentioned below.
Hemostasis: Electrocautery
Xenograft Text: The defect edges were debeveled with a #15 scalpel blade.  Given the location of the defect, shape of the defect and the proximity to free margins a xenograft was deemed most appropriate.  The graft was then trimmed to fit the size of the defect.  The graft was then placed in the primary defect and oriented appropriately.
Abbe Flap (Upper To Lower Lip) Text: The defect of the lower lip was assessed and measured.  Given the location and size of the defect, an Abbe flap was deemed most appropriate.  Using a sterile surgical marker, an appropriate Abbe flap was measured and drawn on the upper lip. Local anesthesia was then infiltrated.  A scalpel was then used to incise the upper lip through and through the skin, vermilion, muscle and mucosa, leaving the flap pedicled on the opposite side.  The flap was then rotated and transferred to the lower lip defect.  The flap was then sutured into place with a three layer technique, closing the orbicularis oris muscle layer with subcutaneous buried sutures, followed by a mucosal layer and an epidermal layer.
Composite Graft Text: The defect edges were debeveled with a #15 scalpel blade.  Given the location of the defect, shape of the defect, the proximity to free margins and the fact the defect was full thickness a composite graft was deemed most appropriate.  The defect was outline and then transferred to the donor site.  A full thickness graft was then excised from the donor site. The graft was then placed in the primary defect, oriented appropriately and then sutured into place.  The secondary defect was then repaired using a primary closure.
Consent (Near Eyelid Margin)/Introductory Paragraph: The rationale for Mohs was explained to the patient and consent was obtained. The risks, benefits and alternatives to therapy were discussed in detail. Specifically, the risks of ectropion or eyelid deformity, infection, scarring, bleeding, prolonged wound healing, incomplete removal, allergy to anesthesia, nerve injury and recurrence were addressed. Prior to the procedure, the treatment site was clearly identified and confirmed by the patient. All components of Universal Protocol/PAUSE Rule completed.
Cheiloplasty (Less Than 50%) Text: A decision was made to reconstruct the defect with a  cheiloplasty.  The defect was undermined extensively.  Additional obicularis oris muscle was excised with a 15 blade scalpel.  The defect was converted into a full thickness wedge, of less than 50% of the vertical height of the lip, to facilite a better cosmetic result.  Small vessels were then tied off with 5-0 monocyrl. The obicularis oris, superficial fascia, adipose and dermis were then reapproximated.  After the deeper layers were approximated the epidermis was reapproximated with particular care given to realign the vermilion border.
Consent (Marginal Mandibular)/Introductory Paragraph: The rationale for Mohs was explained to the patient and consent was obtained. The risks, benefits and alternatives to therapy were discussed in detail. Specifically, the risks of damage to the marginal mandibular branch of the facial nerve, infection, scarring, bleeding, prolonged wound healing, incomplete removal, allergy to anesthesia, and recurrence were addressed. Prior to the procedure, the treatment site was clearly identified and confirmed by the patient. All components of Universal Protocol/PAUSE Rule completed.
Muscle Hinge Flap Text: The defect edges were debeveled with a #15 scalpel blade.  Given the size, depth and location of the defect and the proximity to free margins a muscle hinge flap was deemed most appropriate.  Using a sterile surgical marker, an appropriate hinge flap was drawn incorporating the defect. The area thus outlined was incised with a #15 scalpel blade.  The skin margins were undermined to an appropriate distance in all directions utilizing iris scissors.
Bcc Histology Text: There were numerous aggregates of basaloid cells.

## 2022-12-09 NOTE — PROCEDURE: MIPS QUALITY
Quality 265: Biopsy Follow-Up: Biopsy results reviewed, communicated, tracked, and documented
Quality 130: Documentation Of Current Medications In The Medical Record: Current Medications Documented
Quality 431: Preventive Care And Screening: Unhealthy Alcohol Use - Screening: Patient not identified as an unhealthy alcohol user when screened for unhealthy alcohol use using a systematic screening method
Quality 226: Preventive Care And Screening: Tobacco Use: Screening And Cessation Intervention: Patient screened for tobacco use and is an ex/non-smoker
Quality 111:Pneumonia Vaccination Status For Older Adults: Pneumococcal vaccine (PPSV23) administered on or after patient’s 60th birthday and before the end of the measurement period
Detail Level: Detailed

## 2022-12-13 ENCOUNTER — APPOINTMENT (RX ONLY)
Dept: URBAN - METROPOLITAN AREA CLINIC 22 | Facility: CLINIC | Age: 72
Setting detail: DERMATOLOGY
End: 2022-12-13

## 2022-12-13 DIAGNOSIS — Z48.817 ENCOUNTER FOR SURGICAL AFTERCARE FOLLOWING SURGERY ON THE SKIN AND SUBCUTANEOUS TISSUE: ICD-10-CM

## 2022-12-13 PROCEDURE — ? MEDICATION COUNSELING

## 2022-12-13 PROCEDURE — ? PRESCRIPTION

## 2022-12-13 PROCEDURE — ? POST-OP WOUND CHECK

## 2022-12-13 RX ORDER — DOXYCYCLINE HYCLATE 100 MG/1
1 CAPSULE, GELATIN COATED ORAL BID
Qty: 20 | Refills: 0 | Status: ERX | COMMUNITY
Start: 2022-12-13

## 2022-12-13 RX ADMIN — DOXYCYCLINE HYCLATE 1: 100 CAPSULE, GELATIN COATED ORAL at 00:00

## 2022-12-13 ASSESSMENT — LOCATION SIMPLE DESCRIPTION DERM: LOCATION SIMPLE: LEFT CHEEK

## 2022-12-13 ASSESSMENT — LOCATION DETAILED DESCRIPTION DERM: LOCATION DETAILED: LEFT SUPERIOR MEDIAL MALAR CHEEK

## 2022-12-13 ASSESSMENT — LOCATION ZONE DERM: LOCATION ZONE: FACE

## 2022-12-13 NOTE — PROCEDURE: MEDICATION COUNSELING
Metronidazole Pregnancy And Lactation Text: This medication is Pregnancy Category B and considered safe during pregnancy.  It is also excreted in breast milk.
Eucrisa Counseling: Patient may experience a mild burning sensation during topical application. Eucrisa is not approved in children less than 2 years of age.
Olanzapine Counseling- I discussed with the patient the common side effects of olanzapine including but are not limited to: lack of energy, dry mouth, increased appetite, sleepiness, tremor, constipation, dizziness, changes in behavior, or restlessness.  Explained that teenagers are more likely to experience headaches, abdominal pain, pain in the arms or legs, tiredness, and sleepiness.  Serious side effects include but are not limited: increased risk of death in elderly patients who are confused, have memory loss, or dementia-related psychosis; hyperglycemia; increased cholesterol and triglycerides; and weight gain.
Tetracycline Counseling: Patient counseled regarding possible photosensitivity and increased risk for sunburn.  Patient instructed to avoid sunlight, if possible.  When exposed to sunlight, patients should wear protective clothing, sunglasses, and sunscreen.  The patient was instructed to call the office immediately if the following severe adverse effects occur:  hearing changes, easy bruising/bleeding, severe headache, or vision changes.  The patient verbalized understanding of the proper use and possible adverse effects of tetracycline.  All of the patient's questions and concerns were addressed. Patient understands to avoid pregnancy while on therapy due to potential birth defects.
Libtayo Counseling- I discussed with the patient the risks of Libtayo including but not limited to nausea, vomiting, diarrhea, and bone or muscle pain.  The patient verbalized understanding of the proper use and possible adverse effects of Libtayo.  All of the patient's questions and concerns were addressed.
Glycopyrrolate Pregnancy And Lactation Text: This medication is Pregnancy Category B and is considered safe during pregnancy. It is unknown if it is excreted breast milk.
Xelsevenz Pregnancy And Lactation Text: This medication is Pregnancy Category D and is not considered safe during pregnancy.  The risk during breast feeding is also uncertain.
Minoxidil Pregnancy And Lactation Text: This medication has not been assigned a Pregnancy Risk Category but animal studies failed to show danger with the topical medication. It is unknown if the medication is excreted in breast milk.
Topical Ketoconazole Counseling: Patient counseled that this medication may cause skin irritation or allergic reactions.  In the event of skin irritation, the patient was advised to reduce the amount of the drug applied or use it less frequently.   The patient verbalized understanding of the proper use and possible adverse effects of ketoconazole.  All of the patient's questions and concerns were addressed.
Stelara Pregnancy And Lactation Text: This medication is Pregnancy Category B and is considered safe during pregnancy. It is unknown if this medication is excreted in breast milk.
Cellcept Pregnancy And Lactation Text: This medication is Pregnancy Category D and isn't considered safe during pregnancy. It is unknown if this medication is excreted in breast milk.
Vtama Pregnancy And Lactation Text: It is unknown if this medication can cause problems during pregnancy and breastfeeding.
Dupixent Counseling: I discussed with the patient the risks of dupilumab including but not limited to eye infection and irritation, cold sores, injection site reactions, worsening of asthma, allergic reactions and increased risk of parasitic infection.  Live vaccines should be avoided while taking dupilumab. Dupilumab will also interact with certain medications such as warfarin and cyclosporine. The patient understands that monitoring is required and they must alert us or the primary physician if symptoms of infection or other concerning signs are noted.
Terbinafine Pregnancy And Lactation Text: This medication is Pregnancy Category B and is considered safe during pregnancy. It is also excreted in breast milk and breast feeding isn't recommended.
Valtrex Counseling: I discussed with the patient the risks of valacyclovir including but not limited to kidney damage, nausea, vomiting and severe allergy.  The patient understands that if the infection seems to be worsening or is not improving, they are to call.
Ivermectin Counseling:  Patient instructed to take medication on an empty stomach with a full glass of water.  Patient informed of potential adverse effects including but not limited to nausea, diarrhea, dizziness, itching, and swelling of the extremities or lymph nodes.  The patient verbalized understanding of the proper use and possible adverse effects of ivermectin.  All of the patient's questions and concerns were addressed.
Rituxan Counseling:  I discussed with the patient the risks of Rituxan infusions. Side effects can include infusion reactions, severe drug rashes including mucocutaneous reactions, reactivation of latent hepatitis and other infections and rarely progressive multifocal leukoencephalopathy.  All of the patient's questions and concerns were addressed.
Aklief counseling:  Patient advised to apply a pea-sized amount only at bedtime and wait 30 minutes after washing their face before applying.  If too drying, patient may add a non-comedogenic moisturizer.  The most commonly reported side effects including irritation, redness, scaling, dryness, stinging, burning, itching, and increased risk of sunburn.  The patient verbalized understanding of the proper use and possible adverse effects of retinoids.  All of the patient's questions and concerns were addressed.
Clofazimine Counseling:  I discussed with the patient the risks of clofazimine including but not limited to skin and eye pigmentation, liver damage, nausea/vomiting, gastrointestinal bleeding and allergy.
Zoryve Counseling:  I discussed with the patient that Zoryve is not for use in the eyes, mouth or vagina. The most commonly reported side effects include diarrhea, headache, insomnia, application site pain, upper respiratory tract infections, and urinary tract infections.  All of the patient's questions and concerns were addressed.
Cephalexin Counseling: I counseled the patient regarding use of cephalexin as an antibiotic for prophylactic and/or therapeutic purposes. Cephalexin (commonly prescribed under brand name Keflex) is a cephalosporin antibiotic which is active against numerous classes of bacteria, including most skin bacteria. Side effects may include nausea, diarrhea, gastrointestinal upset, rash, hives, yeast infections, and in rare cases, hepatitis, kidney disease, seizures, fever, confusion, neurologic symptoms, and others. Patients with severe allergies to penicillin medications are cautioned that there is about a 10% incidence of cross-reactivity with cephalosporins. When possible, patients with penicillin allergies should use alternatives to cephalosporins for antibiotic therapy.
Calcipotriene Pregnancy And Lactation Text: This medication has not been proven safe during pregnancy. It is unknown if this medication is excreted in breast milk.
Dupixent Pregnancy And Lactation Text: This medication likely crosses the placenta but the risk for the fetus is uncertain. This medication is excreted in breast milk.
Birth Control Pills Pregnancy And Lactation Text: This medication should be avoided if pregnant and for the first 30 days post-partum.
Rhofade Pregnancy And Lactation Text: This medication has not been assigned a Pregnancy Risk Category. It is unknown if the medication is excreted in breast milk.
Cibinqo Counseling: I discussed with the patient the risks of Cibinqo therapy including but not limited to common cold, nausea, headache, cold sores, increased blood CPK levels, dizziness, UTIs, fatigue, acne, and vomitting. Live vaccines should be avoided.  This medication has been linked to serious infections; higher rate of mortality; malignancy and lymphoproliferative disorders; major adverse cardiovascular events; thrombosis; thrombocytopenia and lymphopenia; lipid elevations; and retinal detachment.
Topical Ketoconazole Pregnancy And Lactation Text: This medication is Pregnancy Category B and is considered safe during pregnancy. It is unknown if it is excreted in breast milk.
Solaraze Counseling:  I discussed with the patient the risks of Solaraze including but not limited to erythema, scaling, itching, weeping, crusting, and pain.
Minocycline Counseling: Patient advised regarding possible photosensitivity and discoloration of the teeth, skin, lips, tongue and gums.  Patient instructed to avoid sunlight, if possible.  When exposed to sunlight, patients should wear protective clothing, sunglasses, and sunscreen.  The patient was instructed to call the office immediately if the following severe adverse effects occur:  hearing changes, easy bruising/bleeding, severe headache, or vision changes.  The patient verbalized understanding of the proper use and possible adverse effects of minocycline.  All of the patient's questions and concerns were addressed.
Cibinqo Pregnancy And Lactation Text: It is unknown if this medication will adversely affect pregnancy or breast feeding.  You should not take this medication if you are currently pregnant or planning a pregnancy or while breastfeeding.
Hydroxychloroquine Counseling:  I discussed with the patient that a baseline ophthalmologic exam is needed at the start of therapy and every year thereafter while on therapy. A CBC may also be warranted for monitoring.  The side effects of this medication were discussed with the patient, including but not limited to agranulocytosis, aplastic anemia, seizures, rashes, retinopathy, and liver toxicity. Patient instructed to call the office should any adverse effect occur.  The patient verbalized understanding of the proper use and possible adverse effects of Plaquenil.  All the patient's questions and concerns were addressed.
Detail Level: Detailed
Cimetidine Counseling:  I discussed with the patient the risks of Cimetidine including but not limited to gynecomastia, headache, diarrhea, nausea, drowsiness, arrhythmias, pancreatitis, skin rashes, psychosis, bone marrow suppression and kidney toxicity.
Mirvaso Counseling: Mirvaso is a topical medication which can decrease superficial blood flow where applied. Side effects are uncommon and include stinging, redness and allergic reactions.
Olanzapine Pregnancy And Lactation Text: This medication is pregnancy category C.   There are no adequate and well controlled trials with olanzapine in pregnant females.  Olanzapine should be used during pregnancy only if the potential benefit justifies the potential risk to the fetus.   In a study in lactating healthy women, olanzapine was excreted in breast milk.  It is recommended that women taking olanzapine should not breast feed.
Valtrex Pregnancy And Lactation Text: this medication is Pregnancy Category B and is considered safe during pregnancy. This medication is not directly found in breast milk but it's metabolite acyclovir is present.
Taltz Counseling: I discussed with the patient the risks of ixekizumab including but not limited to immunosuppression, serious infections, worsening of inflammatory bowel disease and drug reactions.  The patient understands that monitoring is required including a PPD at baseline and must alert us or the primary physician if symptoms of infection or other concerning signs are noted.
Cyclophosphamide Counseling:  I discussed with the patient the risks of cyclophosphamide including but not limited to hair loss, hormonal abnormalities, decreased fertility, abdominal pain, diarrhea, nausea and vomiting, bone marrow suppression and infection. The patient understands that monitoring is required while taking this medication.
Tetracycline Pregnancy And Lactation Text: This medication is Pregnancy Category D and not consider safe during pregnancy. It is also excreted in breast milk.
Use Enhanced Medication Counseling?: No
Enbrel Counseling:  I discussed with the patient the risks of etanercept including but not limited to myelosuppression, immunosuppression, autoimmune hepatitis, demyelinating diseases, lymphoma, and infections.  The patient understands that monitoring is required including a PPD at baseline and must alert us or the primary physician if symptoms of infection or other concerning signs are noted.
Opzelura Pregnancy And Lactation Text: There is insufficient data to evaluate drug-associated risk for major birth defects, miscarriage, or other adverse maternal or fetal outcomes.  There is a pregnancy registry that monitors pregnancy outcomes in pregnant persons exposed to the medication during pregnancy.  It is unknown if this medication is excreted in breast milk.  Do not breastfeed during treatment and for about 4 weeks after the last dose.
Aklief Pregnancy And Lactation Text: It is unknown if this medication is safe to use during pregnancy.  It is unknown if this medication is excreted in breast milk.  Breastfeeding women should use the topical cream on the smallest area of the skin for the shortest time needed while breastfeeding.  Do not apply to nipple and areola.
Oral Minoxidil Counseling- I discussed with the patient the risks of oral minoxidil including but not limited to shortness of breath, swelling of the feet or ankles, dizziness, lightheadedness, unwanted hair growth and allergic reaction.  The patient verbalized understanding of the proper use and possible adverse effects of oral minoxidil.  All of the patient's questions and concerns were addressed.
Cantharidin Counseling: Calcipotriene Counseling:  I discussed with the patient the risks of calcipotriene including but not limited to erythema, scaling, itching, and irritation.
Clofazimine Pregnancy And Lactation Text: This medication is Pregnancy Category C and isn't considered safe during pregnancy. It is excreted in breast milk.
Spironolactone Counseling: Patient advised regarding risks of diarrhea, abdominal pain, hyperkalemia, birth defects (for female patients), liver toxicity and renal toxicity. The patient may need blood work to monitor liver and kidney function and potassium levels while on therapy. The patient verbalized understanding of the proper use and possible adverse effects of spironolactone.  All of the patient's questions and concerns were addressed.
Rituxan Pregnancy And Lactation Text: This medication is Pregnancy Category C and it isn't know if it is safe during pregnancy. It is unknown if this medication is excreted in breast milk but similar antibodies are known to be excreted.
Libtayo Pregnancy And Lactation Text: This medication is contraindicated in pregnancy and when breast feeding.
Acitretin Counseling:  I discussed with the patient the risks of acitretin including but not limited to hair loss, dry lips/skin/eyes, liver damage, hyperlipidemia, depression/suicidal ideation, photosensitivity.  Serious rare side effects can include but are not limited to pancreatitis, pseudotumor cerebri, bony changes, clot formation/stroke/heart attack.  Patient understands that alcohol is contraindicated since it can result in liver toxicity and significantly prolong the elimination of the drug by many years.
Ivermectin Pregnancy And Lactation Text: This medication is Pregnancy Category C and it isn't known if it is safe during pregnancy. It is also excreted in breast milk.
Cephalexin Pregnancy And Lactation Text: This medication is Pregnancy Category B and considered safe during pregnancy.  It is also excreted in breast milk but can be used safely for shorter doses.
Odomzo Counseling- I discussed with the patient the risks of Odomzo including but not limited to nausea, vomiting, diarrhea, constipation, weight loss, changes in the sense of taste, decreased appetite, muscle spasms, and hair loss.  The patient verbalized understanding of the proper use and possible adverse effects of Odomzo.  All of the patient's questions and concerns were addressed.
Clindamycin Counseling: I counseled the patient regarding use of clindamycin as an antibiotic for prophylactic and/or therapeutic purposes. Clindamycin is active against numerous classes of bacteria, including skin bacteria. Side effects may include nausea, diarrhea, gastrointestinal upset, rash, hives, yeast infections, and in rare cases, colitis.
Olumiant Counseling: I discussed with the patient the risks of Olumiant therapy including but not limited to upper respiratory tract infections, shingles, cold sores, and nausea. Live vaccines should be avoided.  This medication has been linked to serious infections; higher rate of mortality; malignancy and lymphoproliferative disorders; major adverse cardiovascular events; thrombosis; gastrointestinal perforations; neutropenia; lymphopenia; anemia; liver enzyme elevations; and lipid elevations.
Cantharidin Pregnancy And Lactation Text: The use of this medication during pregnancy or lactation is not recommended as there is insufficient data.
Hydroxychloroquine Pregnancy And Lactation Text: This medication has been shown to cause fetal harm but it isn't assigned a Pregnancy Risk Category. There are small amounts excreted in breast milk.
Colchicine Counseling:  Patient counseled regarding adverse effects including but not limited to stomach upset (nausea, vomiting, stomach pain, or diarrhea).  Patient instructed to limit alcohol consumption while taking this medication.  Colchicine may reduce blood counts especially with prolonged use.  The patient understands that monitoring of kidney function and blood counts may be required, especially at baseline. The patient verbalized understanding of the proper use and possible adverse effects of colchicine.  All of the patient's questions and concerns were addressed.
Hydroquinone Counseling:  Patient advised that medication may result in skin irritation, lightening (hypopigmentation), dryness, and burning.  In the event of skin irritation, the patient was advised to reduce the amount of the drug applied or use it less frequently.  Rarely, spots that are treated with hydroquinone can become darker (pseudoochronosis).  Should this occur, patient instructed to stop medication and call the office. The patient verbalized understanding of the proper use and possible adverse effects of hydroquinone.  All of the patient's questions and concerns were addressed.
Solaraze Pregnancy And Lactation Text: This medication is Pregnancy Category B and is considered safe. There is some data to suggest avoiding during the third trimester. It is unknown if this medication is excreted in breast milk.
Siliq Counseling:  I discussed with the patient the risks of Siliq including but not limited to new or worsening depression, suicidal thoughts and behavior, immunosuppression, malignancy, posterior leukoencephalopathy syndrome, and serious infections.  The patient understands that monitoring is required including a PPD at baseline and must alert us or the primary physician if symptoms of infection or other concerning signs are noted. There is also a special program designed to monitor depression which is required with Siliq.
Fluconazole Counseling:  Patient counseled regarding adverse effects of fluconazole including but not limited to headache, diarrhea, nausea, upset stomach, liver function test abnormalities, taste disturbance, and stomach pain.  There is a rare possibility of liver failure that can occur when taking fluconazole.  The patient understands that monitoring of LFTs and kidney function test may be required, especially at baseline. The patient verbalized understanding of the proper use and possible adverse effects of fluconazole.  All of the patient's questions and concerns were addressed.
Taltz Pregnancy And Lactation Text: The risk during pregnancy and breastfeeding is uncertain with this medication.
Cyclophosphamide Pregnancy And Lactation Text: This medication is Pregnancy Category D and it isn't considered safe during pregnancy. This medication is excreted in breast milk.
Topical Sulfur Applications Counseling: Topical Sulfur Counseling: Patient counseled that this medication may cause skin irritation or allergic reactions.  In the event of skin irritation, the patient was advised to reduce the amount of the drug applied or use it less frequently.   The patient verbalized understanding of the proper use and possible adverse effects of topical sulfur application.  All of the patient's questions and concerns were addressed.
Zyclara Counseling:  I discussed with the patient the risks of imiquimod including but not limited to erythema, scaling, itching, weeping, crusting, and pain.  Patient understands that the inflammatory response to imiquimod is variable from person to person and was educated regarded proper titration schedule.  If flu-like symptoms develop, patient knows to discontinue the medication and contact us.
Oral Minoxidil Pregnancy And Lactation Text: This medication should only be used when clearly needed if you are pregnant, attempting to become pregnant or breast feeding.
Opzelura Counseling:  I discussed with the patient the risks of Opzelura including but not limited to nasopharngitis, bronchitis, ear infection, eosinophila, hives, diarrhea, folliculitis, tonsillitis, and rhinorrhea.  Taken orally, this medication has been linked to serious infections; higher rate of mortality; malignancy and lymphoproliferative disorders; major adverse cardiovascular events; thrombosis; thrombocytopenia, anemia, and neutropenia; and lipid elevations.
Topical Sulfur Applications Pregnancy And Lactation Text: This medication is Pregnancy Category C and has an unknown safety profile during pregnancy. It is unknown if this topical medication is excreted in breast milk.
Azelaic Acid Counseling: Patient counseled that medicine may cause skin irritation and to avoid applying near the eyes.  In the event of skin irritation, the patient was advised to reduce the amount of the drug applied or use it less frequently.   The patient verbalized understanding of the proper use and possible adverse effects of azelaic acid.  All of the patient's questions and concerns were addressed.
Adbry Counseling: I discussed with the patient the risks of tralokinumab including but not limited to eye infection and irritation, cold sores, injection site reactions, worsening of asthma, allergic reactions and increased risk of parasitic infection.  Live vaccines should be avoided while taking tralokinumab. The patient understands that monitoring is required and they must alert us or the primary physician if symptoms of infection or other concerning signs are noted.
Tremfya Counseling: I discussed with the patient the risks of guselkumab including but not limited to immunosuppression, serious infections, and drug reactions.  The patient understands that monitoring is required including a PPD at baseline and must alert us or the primary physician if symptoms of infection or other concerning signs are noted.
Acitretin Pregnancy And Lactation Text: This medication is Pregnancy Category X and should not be given to women who are pregnant or may become pregnant in the future. This medication is excreted in breast milk.
Picato Counseling:  I discussed with the patient the risks of Picato including but not limited to erythema, scaling, itching, weeping, crusting, and pain.
Bexarotene Counseling:  I discussed with the patient the risks of bexarotene including but not limited to hair loss, dry lips/skin/eyes, liver abnormalities, hyperlipidemia, pancreatitis, depression/suicidal ideation, photosensitivity, drug rash/allergic reactions, hypothyroidism, anemia, leukopenia, infection, cataracts, and teratogenicity.  Patient understands that they will need regular blood tests to check lipid profile, liver function tests, white blood cell count, thyroid function tests and pregnancy test if applicable.
Topical Retinoid counseling:  Patient advised to apply a pea-sized amount only at bedtime and wait 30 minutes after washing their face before applying.  If too drying, patient may add a non-comedogenic moisturizer. The patient verbalized understanding of the proper use and possible adverse effects of retinoids.  All of the patient's questions and concerns were addressed.
Clindamycin Pregnancy And Lactation Text: This medication can be used in pregnancy if certain situations. Clindamycin is also present in breast milk.
Picato Pregnancy And Lactation Text: This medication is Pregnancy Category C. It is unknown if this medication is excreted in breast milk.
5-Fu Counseling: 5-Fluorouracil Counseling:  I discussed with the patient the risks of 5-fluorouracil including but not limited to erythema, scaling, itching, weeping, crusting, and pain.
Doxepin Counseling:  Patient advised that the medication is sedating and not to drive a car after taking this medication. Patient informed of potential adverse effects including but not limited to dry mouth, urinary retention, and blurry vision.  The patient verbalized understanding of the proper use and possible adverse effects of doxepin.  All of the patient's questions and concerns were addressed.
Low Dose Naltrexone Counseling- I discussed with the patient the potential risks and side effects of low dose naltrexone including but not limited to: more vivid dreams, headaches, nausea, vomiting, abdominal pain, fatigue, dizziness, and anxiety.
Griseofulvin Pregnancy And Lactation Text: This medication is Pregnancy Category X and is known to cause serious birth defects. It is unknown if this medication is excreted in breast milk but breast feeding should be avoided.
Fluconazole Pregnancy And Lactation Text: This medication is Pregnancy Category C and it isn't know if it is safe during pregnancy. It is also excreted in breast milk.
Cyclosporine Counseling:  I discussed with the patient the risks of cyclosporine including but not limited to hypertension, gingival hyperplasia,myelosuppression, immunosuppression, liver damage, kidney damage, neurotoxicity, lymphoma, and serious infections. The patient understands that monitoring is required including baseline blood pressure, CBC, CMP, lipid panel and uric acid, and then 1-2 times monthly CMP and blood pressure.
SSKI Counseling:  I discussed with the patient the risks of SSKI including but not limited to thyroid abnormalities, metallic taste, GI upset, fever, headache, acne, arthralgias, paraesthesias, lymphadenopathy, easy bleeding, arrhythmias, and allergic reaction.
Odomzo Pregnancy And Lactation Text: This medication is Pregnancy Category X and is absolutely contraindicated during pregnancy. It is unknown if it is excreted in breast milk.
Quinolones Counseling:  I discussed with the patient the risks of fluoroquinolones including but not limited to GI upset, allergic reaction, drug rash, diarrhea, dizziness, photosensitivity, yeast infections, liver function test abnormalities, tendonitis/tendon rupture.
Olumiant Pregnancy And Lactation Text: Based on animal studies, Olumiant may cause embryo-fetal harm when administered to pregnant women.  The medication should not be used in pregnancy.  Breastfeeding is not recommended during treatment.
Sski Pregnancy And Lactation Text: This medication is Pregnancy Category D and isn't considered safe during pregnancy. It is excreted in breast milk.
Adbry Pregnancy And Lactation Text: It is unknown if this medication will adversely affect pregnancy or breast feeding.
Imiquimod Counseling:  I discussed with the patient the risks of imiquimod including but not limited to erythema, scaling, itching, weeping, crusting, and pain.  Patient understands that the inflammatory response to imiquimod is variable from person to person and was educated regarded proper titration schedule.  If flu-like symptoms develop, patient knows to discontinue the medication and contact us.
Otezla Counseling: The side effects of Otezla were discussed with the patient, including but not limited to worsening or new depression, weight loss, diarrhea, nausea, upper respiratory tract infection, and headache. Patient instructed to call the office should any adverse effect occur.  The patient verbalized understanding of the proper use and possible adverse effects of Otezla.  All the patient's questions and concerns were addressed.
Itraconazole Counseling:  I discussed with the patient the risks of itraconazole including but not limited to liver damage, nausea/vomiting, neuropathy, and severe allergy.  The patient understands that this medication is best absorbed when taken with acidic beverages such as non-diet cola or ginger ale.  The patient understands that monitoring is required including baseline LFTs and repeat LFTs at intervals.  The patient understands that they are to contact us or the primary physician if concerning signs are noted.
Wartpeel Counseling:  I discussed with the patient the risks of Wartpeel including but not limited to erythema, scaling, itching, weeping, crusting, and pain.
Low Dose Naltrexone Pregnancy And Lactation Text: Naltrexone is pregnancy category C.  There have been no adequate and well-controlled studies in pregnant women.  It should be used in pregnancy only if the potential benefit justifies the potential risk to the fetus.   Limited data indicates that naltrexone is minimally excreted into breastmilk.
Cyclosporine Pregnancy And Lactation Text: This medication is Pregnancy Category C and it isn't know if it is safe during pregnancy. This medication is excreted in breast milk.
Azelaic Acid Pregnancy And Lactation Text: This medication is considered safe during pregnancy and breast feeding.
Humira Counseling:  I discussed with the patient the risks of adalimumab including but not limited to myelosuppression, immunosuppression, autoimmune hepatitis, demyelinating diseases, lymphoma, and serious infections.  The patient understands that monitoring is required including a PPD at baseline and must alert us or the primary physician if symptoms of infection or other concerning signs are noted.
Benzoyl Peroxide Counseling: Patient counseled that medicine may cause skin irritation and bleach clothing.  In the event of skin irritation, the patient was advised to reduce the amount of the drug applied or use it less frequently.   The patient verbalized understanding of the proper use and possible adverse effects of benzoyl peroxide.  All of the patient's questions and concerns were addressed.
Protopic Counseling: Patient may experience a mild burning sensation during topical application. Protopic is not approved in children less than 2 years of age. There have been case reports of hematologic and skin malignancies in patients using topical calcineurin inhibitors although causality is questionable.
Dapsone Counseling: I discussed with the patient the risks of dapsone including but not limited to hemolytic anemia, agranulocytosis, rashes, methemoglobinemia, kidney failure, peripheral neuropathy, headaches, GI upset, and liver toxicity.  Patients who start dapsone require monitoring including baseline LFTs and weekly CBCs for the first month, then every month thereafter.  The patient verbalized understanding of the proper use and possible adverse effects of dapsone.  All of the patient's questions and concerns were addressed.
Spironolactone Pregnancy And Lactation Text: This medication can cause feminization of the male fetus and should be avoided during pregnancy. The active metabolite is also found in breast milk.
Doxycycline Counseling:  Patient counseled regarding possible photosensitivity and increased risk for sunburn.  Patient instructed to avoid sunlight, if possible.  When exposed to sunlight, patients should wear protective clothing, sunglasses, and sunscreen.  The patient was instructed to call the office immediately if the following severe adverse effects occur:  hearing changes, easy bruising/bleeding, severe headache, or vision changes.  The patient verbalized understanding of the proper use and possible adverse effects of doxycycline.  All of the patient's questions and concerns were addressed.
Dutasteride Male Counseling: Dustasteride Counseling:  I discussed with the patient the risks of use of dutasteride including but not limited to decreased libido, decreased ejaculate volume, and gynecomastia. Women who can become pregnant should not handle medication.  All of the patient's questions and concerns were addressed.
Simponi Counseling:  I discussed with the patient the risks of golimumab including but not limited to myelosuppression, immunosuppression, autoimmune hepatitis, demyelinating diseases, lymphoma, and serious infections.  The patient understands that monitoring is required including a PPD at baseline and must alert us or the primary physician if symptoms of infection or other concerning signs are noted.
Doxepin Pregnancy And Lactation Text: This medication is Pregnancy Category C and it isn't known if it is safe during pregnancy. It is also excreted in breast milk and breast feeding isn't recommended.
Griseofulvin Counseling:  I discussed with the patient the risks of griseofulvin including but not limited to photosensitivity, cytopenia, liver damage, nausea/vomiting and severe allergy.  The patient understands that this medication is best absorbed when taken with a fatty meal (e.g., ice cream or french fries).
Bexarotene Pregnancy And Lactation Text: This medication is Pregnancy Category X and should not be given to women who are pregnant or may become pregnant. This medication should not be used if you are breast feeding.
Rinvoq Counseling: I discussed with the patient the risks of Rinvoq therapy including but not limited to upper respiratory tract infections, shingles, cold sores, bronchitis, nausea, cough, fever, acne, and headache. Live vaccines should be avoided.  This medication has been linked to serious infections; higher rate of mortality; malignancy and lymphoproliferative disorders; major adverse cardiovascular events; thrombosis; thrombocytopenia, anemia, and neutropenia; lipid elevations; liver enzyme elevations; and gastrointestinal perforations.
5-Fu Pregnancy And Lactation Text: This medication is Pregnancy Category X and contraindicated in pregnancy and in women who may become pregnant. It is unknown if this medication is excreted in breast milk.
Opioid Counseling: I discussed with the patient the potential side effects of opioids including but not limited to addiction, altered mental status, and depression. I stressed avoiding alcohol, benzodiazepines, muscle relaxants and sleep aids unless specifically okayed by a physician. The patient verbalized understanding of the proper use and possible adverse effects of opioids. All of the patient's questions and concerns were addressed. They were instructed to flush the remaining pills down the toilet if they did not need them for pain.
Drysol Counseling:  I discussed with the patient the risks of drysol/aluminum chloride including but not limited to skin rash, itching, irritation, burning.
Xolair Counseling:  Patient informed of potential adverse effects including but not limited to fever, muscle aches, rash and allergic reactions.  The patient verbalized understanding of the proper use and possible adverse effects of Xolair.  All of the patient's questions and concerns were addressed.
Niacinamide Counseling: I recommended taking niacin or niacinamide, also know as vitamin B3, twice daily. Recent evidence suggests that taking vitamin B3 (500 mg twice daily) can reduce the risk of actinic keratoses and non-melanoma skin cancers. Side effects of vitamin B3 include flushing and headache.
Hydroxyzine Counseling: Patient advised that the medication is sedating and not to drive a car after taking this medication.  Patient informed of potential adverse effects including but not limited to dry mouth, urinary retention, and blurry vision.  The patient verbalized understanding of the proper use and possible adverse effects of hydroxyzine.  All of the patient's questions and concerns were addressed.
Rifampin Counseling: I discussed with the patient the risks of rifampin including but not limited to liver damage, kidney damage, red-orange body fluids, nausea/vomiting and severe allergy.
Rinvoq Pregnancy And Lactation Text: Based on animal studies, Rinvoq may cause embryo-fetal harm when administered to pregnant women.  The medication should not be used in pregnancy.  Breastfeeding is not recommended during treatment and for 6 days after the last dose.
Tazorac Counseling:  Patient advised that medication is irritating and drying.  Patient may need to apply sparingly and wash off after an hour before eventually leaving it on overnight.  The patient verbalized understanding of the proper use and possible adverse effects of tazorac.  All of the patient's questions and concerns were addressed.
Cimzia Counseling:  I discussed with the patient the risks of Cimzia including but not limited to immunosuppression, allergic reactions and infections.  The patient understands that monitoring is required including a PPD at baseline and must alert us or the primary physician if symptoms of infection or other concerning signs are noted.
Otezla Pregnancy And Lactation Text: This medication is Pregnancy Category C and it isn't known if it is safe during pregnancy. It is unknown if it is excreted in breast milk.
Methotrexate Counseling:  Patient counseled regarding adverse effects of methotrexate including but not limited to nausea, vomiting, abnormalities in liver function tests. Patients may develop mouth sores, rash, diarrhea, and abnormalities in blood counts. The patient understands that monitoring is required including LFT's and blood counts.  There is a rare possibility of scarring of the liver and lung problems that can occur when taking methotrexate. Persistent nausea, loss of appetite, pale stools, dark urine, cough, and shortness of breath should be reported immediately. Patient advised to discontinue methotrexate treatment at least three months before attempting to become pregnant.  I discussed the need for folate supplements while taking methotrexate.  These supplements can decrease side effects during methotrexate treatment. The patient verbalized understanding of the proper use and possible adverse effects of methotrexate.  All of the patient's questions and concerns were addressed.
Thalidomide Counseling: I discussed with the patient the risks of thalidomide including but not limited to birth defects, anxiety, weakness, chest pain, dizziness, cough and severe allergy.
Protopic Pregnancy And Lactation Text: This medication is Pregnancy Category C. It is unknown if this medication is excreted in breast milk when applied topically.
Ilumya Counseling: I discussed with the patient the risks of tildrakizumab including but not limited to immunosuppression, malignancy, posterior leukoencephalopathy syndrome, and serious infections.  The patient understands that monitoring is required including a PPD at baseline and must alert us or the primary physician if symptoms of infection or other concerning signs are noted.
Azithromycin Counseling:  I discussed with the patient the risks of azithromycin including but not limited to GI upset, allergic reaction, drug rash, diarrhea, and yeast infections.
Oxybutynin Counseling:  I discussed with the patient the risks of oxybutynin including but not limited to skin rash, drowsiness, dry mouth, difficulty urinating, and blurred vision.
Benzoyl Peroxide Pregnancy And Lactation Text: This medication is Pregnancy Category C. It is unknown if benzoyl peroxide is excreted in breast milk.
Cimzia Pregnancy And Lactation Text: This medication crosses the placenta but can be considered safe in certain situations. Cimzia may be excreted in breast milk.
Dutasteride Pregnancy And Lactation Text: This medication is absolutely contraindicated in women, especially during pregnancy and breast feeding. Feminization of male fetuses is possible if taking while pregnant.
Isotretinoin Counseling: Patient should get monthly blood tests, not donate blood, not drive at night if vision affected, not share medication, and not undergo elective surgery for 6 months after tx completed. Side effects reviewed, pt to contact office should one occur.
Dapsone Pregnancy And Lactation Text: This medication is Pregnancy Category C and is not considered safe during pregnancy or breast feeding.
Doxycycline Pregnancy And Lactation Text: This medication is Pregnancy Category D and not consider safe during pregnancy. It is also excreted in breast milk but is considered safe for shorter treatment courses.
Hydroxyzine Pregnancy And Lactation Text: This medication is not safe during pregnancy and should not be taken. It is also excreted in breast milk and breast feeding isn't recommended.
Erythromycin Counseling:  I discussed with the patient the risks of erythromycin including but not limited to GI upset, allergic reaction, drug rash, diarrhea, increase in liver enzymes, and yeast infections.
Tazorac Pregnancy And Lactation Text: This medication is not safe during pregnancy. It is unknown if this medication is excreted in breast milk.
Gabapentin Counseling: I discussed with the patient the risks of gabapentin including but not limited to dizziness, somnolence, fatigue and ataxia.
Sotyktu Counseling:  I discussed the most common side effects of Sotyktu including: common cold, sore throat, sinus infections, cold sores, canker sores, folliculitis, and acne.  I also discussed more serious side effects of Sotyktu including but not limited to: serious allergic reactions; increased risk for infections such as TB; cancers such as lymphomas; rhabdomyolysis and elevated CPK; and elevated triglycerides and liver enzymes. 
Skyrizi Counseling: I discussed with the patient the risks of risankizumab-rzaa including but not limited to immunosuppression, and serious infections.  The patient understands that monitoring is required including a PPD at baseline and must alert us or the primary physician if symptoms of infection or other concerning signs are noted.
Azathioprine Counseling:  I discussed with the patient the risks of azathioprine including but not limited to myelosuppression, immunosuppression, hepatotoxicity, lymphoma, and infections.  The patient understands that monitoring is required including baseline LFTs, Creatinine, possible TPMP genotyping and weekly CBCs for the first month and then every 2 weeks thereafter.  The patient verbalized understanding of the proper use and possible adverse effects of azathioprine.  All of the patient's questions and concerns were addressed.
Niacinamide Pregnancy And Lactation Text: These medications are considered safe during pregnancy.
Xolair Pregnancy And Lactation Text: This medication is Pregnancy Category B and is considered safe during pregnancy. This medication is excreted in breast milk.
Ketoconazole Counseling:   Patient counseled regarding improving absorption with orange juice.  Adverse effects include but are not limited to breast enlargement, headache, diarrhea, nausea, upset stomach, liver function test abnormalities, taste disturbance, and stomach pain.  There is a rare possibility of liver failure that can occur when taking ketoconazole. The patient understands that monitoring of LFTs may be required, especially at baseline. The patient verbalized understanding of the proper use and possible adverse effects of ketoconazole.  All of the patient's questions and concerns were addressed.
Methotrexate Pregnancy And Lactation Text: This medication is Pregnancy Category X and is known to cause fetal harm. This medication is excreted in breast milk.
Opioid Pregnancy And Lactation Text: These medications can lead to premature delivery and should be avoided during pregnancy. These medications are also present in breast milk in small amounts.
Winlevi Counseling:  I discussed with the patient the risks of topical clascoterone including but not limited to erythema, scaling, itching, and stinging. Patient voiced their understanding.
Klisyri Counseling:  I discussed with the patient the risks of Klisyri including but not limited to erythema, scaling, itching, weeping, crusting, and pain.
Rifampin Pregnancy And Lactation Text: This medication is Pregnancy Category C and it isn't know if it is safe during pregnancy. It is also excreted in breast milk and should not be used if you are breast feeding.
Ketoconazole Pregnancy And Lactation Text: This medication is Pregnancy Category C and it isn't know if it is safe during pregnancy. It is also excreted in breast milk and breast feeding isn't recommended.
Sarecycline Counseling: Patient advised regarding possible photosensitivity and discoloration of the teeth, skin, lips, tongue and gums.  Patient instructed to avoid sunlight, if possible.  When exposed to sunlight, patients should wear protective clothing, sunglasses, and sunscreen.  The patient was instructed to call the office immediately if the following severe adverse effects occur:  hearing changes, easy bruising/bleeding, severe headache, or vision changes.  The patient verbalized understanding of the proper use and possible adverse effects of sarecycline.  All of the patient's questions and concerns were addressed.
Tranexamic Acid Counseling:  Patient advised of the small risk of bleeding problems with tranexamic acid. They were also instructed to call if they developed any nausea, vomiting or diarrhea. All of the patient's questions and concerns were addressed.
Finasteride Male Counseling: Finasteride Counseling:  I discussed with the patient the risks of use of finasteride including but not limited to decreased libido, decreased ejaculate volume, gynecomastia, and depression. Women should not handle medication.  All of the patient's questions and concerns were addressed.
Cosentyx Counseling:  I discussed with the patient the risks of Cosentyx including but not limited to worsening of Crohn's disease, immunosuppression, allergic reactions and infections.  The patient understands that monitoring is required including a PPD at baseline and must alert us or the primary physician if symptoms of infection or other concerning signs are noted.
Winlevi Pregnancy And Lactation Text: This medication is considered safe during pregnancy and breastfeeding.
Prednisone Counseling:  I discussed with the patient the risks of prolonged use of prednisone including but not limited to weight gain, insomnia, osteoporosis, mood changes, diabetes, susceptibility to infection, glaucoma and high blood pressure.  In cases where prednisone use is prolonged, patients should be monitored with blood pressure checks, serum glucose levels and an eye exam.  Additionally, the patient may need to be placed on GI prophylaxis, PCP prophylaxis, and calcium and vitamin D supplementation and/or a bisphosphonate.  The patient verbalized understanding of the proper use and the possible adverse effects of prednisone.  All of the patient's questions and concerns were addressed.
Carac Counseling:  I discussed with the patient the risks of Carac including but not limited to erythema, scaling, itching, weeping, crusting, and pain.
Isotretinoin Pregnancy And Lactation Text: This medication is Pregnancy Category X and is considered extremely dangerous during pregnancy. It is unknown if it is excreted in breast milk.
Qbrexza Counseling:  I discussed with the patient the risks of Qbrexza including but not limited to headache, mydriasis, blurred vision, dry eyes, nasal dryness, dry mouth, dry throat, dry skin, urinary hesitation, and constipation.  Local skin reactions including erythema, burning, stinging, and itching can also occur.
Azithromycin Pregnancy And Lactation Text: This medication is considered safe during pregnancy and is also secreted in breast milk.
Topical Clindamycin Counseling: Patient counseled that this medication may cause skin irritation or allergic reactions.  In the event of skin irritation, the patient was advised to reduce the amount of the drug applied or use it less frequently.   The patient verbalized understanding of the proper use and possible adverse effects of clindamycin.  All of the patient's questions and concerns were addressed.
High Dose Vitamin A Counseling: Side effects reviewed, pt to contact office should one occur.
Erivedge Counseling- I discussed with the patient the risks of Erivedge including but not limited to nausea, vomiting, diarrhea, constipation, weight loss, changes in the sense of taste, decreased appetite, muscle spasms, and hair loss.  The patient verbalized understanding of the proper use and possible adverse effects of Erivedge.  All of the patient's questions and concerns were addressed.
Bactrim Counseling:  I discussed with the patient the risks of sulfa antibiotics including but not limited to GI upset, allergic reaction, drug rash, diarrhea, dizziness, photosensitivity, and yeast infections.  Rarely, more serious reactions can occur including but not limited to aplastic anemia, agranulocytosis, methemoglobinemia, blood dyscrasias, liver or kidney failure, lung infiltrates or desquamative/blistering drug rashes.
Erythromycin Pregnancy And Lactation Text: This medication is Pregnancy Category B and is considered safe during pregnancy. It is also excreted in breast milk.
Elidel Counseling: Patient may experience a mild burning sensation during topical application. Elidel is not approved in children less than 2 years of age. There have been case reports of hematologic and skin malignancies in patients using topical calcineurin inhibitors although causality is questionable.
Qbrexza Pregnancy And Lactation Text: There is no available data on Qbrexza use in pregnant women.  There is no available data on Qbrexza use in lactation.
Infliximab Counseling:  I discussed with the patient the risks of infliximab including but not limited to myelosuppression, immunosuppression, autoimmune hepatitis, demyelinating diseases, lymphoma, and serious infections.  The patient understands that monitoring is required including a PPD at baseline and must alert us or the primary physician if symptoms of infection or other concerning signs are noted.
Sotyktu Pregnancy And Lactation Text: There is insufficient data to evaluate whether or not Sotyktu is safe to use during pregnancy.   It is not known if Sotyktu passes into breast milk and whether or not it is safe to use when breastfeeding.  
Klisyri Pregnancy And Lactation Text: It is unknown if this medication can harm a developing fetus or if it is excreted in breast milk.
Nsaids Counseling: NSAID Counseling: I discussed with the patient that NSAIDs should be taken with food. Prolonged use of NSAIDs can result in the development of stomach ulcers.  Patient advised to stop taking NSAIDs if abdominal pain occurs.  The patient verbalized understanding of the proper use and possible adverse effects of NSAIDs.  All of the patient's questions and concerns were addressed.
Terbinafine Counseling: Patient counseling regarding adverse effects of terbinafine including but not limited to headache, diarrhea, rash, upset stomach, liver function test abnormalities, itching, taste/smell disturbance, nausea, abdominal pain, and flatulence.  There is a rare possibility of liver failure that can occur when taking terbinafine.  The patient understands that a baseline LFT and kidney function test may be required. The patient verbalized understanding of the proper use and possible adverse effects of terbinafine.  All of the patient's questions and concerns were addressed.
Xeljanz Counseling: I discussed with the patient the risks of Xeljanz therapy including increased risk of infection, liver issues, headache, diarrhea, or cold symptoms. Live vaccines should be avoided. They were instructed to call if they have any problems.
Minoxidil Counseling: Minoxidil is a topical medication which can increase blood flow where it is applied. It is uncertain how this medication increases hair growth. Side effects are uncommon and include stinging and allergic reactions.
VTAMA Counseling: I discussed with the patient that VTAMA is not for use in the eyes, mouth or mouth. They should call the office if they develop any signs of allergic reactions to VTAMA. The patient verbalized understanding of the proper use and possible adverse effects of VTAMA.  All of the patient's questions and concerns were addressed.
Nsaids Pregnancy And Lactation Text: These medications are considered safe up to 30 weeks gestation. It is excreted in breast milk.
Propranolol Counseling:  I discussed with the patient the risks of propranolol including but not limited to low heart rate, low blood pressure, low blood sugar, restlessness and increased cold sensitivity. They should call the office if they experience any of these side effects.
Finasteride Pregnancy And Lactation Text: This medication is absolutely contraindicated during pregnancy. It is unknown if it is excreted in breast milk.
Albendazole Counseling:  I discussed with the patient the risks of albendazole including but not limited to cytopenia, kidney damage, nausea/vomiting and severe allergy.  The patient understands that this medication is being used in an off-label manner.
Arava Counseling:  Patient counseled regarding adverse effects of Arava including but not limited to nausea, vomiting, abnormalities in liver function tests. Patients may develop mouth sores, rash, diarrhea, and abnormalities in blood counts. The patient understands that monitoring is required including LFTs and blood counts.  There is a rare possibility of scarring of the liver and lung problems that can occur when taking methotrexate. Persistent nausea, loss of appetite, pale stools, dark urine, cough, and shortness of breath should be reported immediately. Patient advised to discontinue Arava treatment and consult with a physician prior to attempting conception. The patient will have to undergo a treatment to eliminate Arava from the body prior to conception.
Tranexamic Acid Pregnancy And Lactation Text: It is unknown if this medication is safe during pregnancy or breast feeding.
Bactrim Pregnancy And Lactation Text: This medication is Pregnancy Category D and is known to cause fetal risk.  It is also excreted in breast milk.
Rhofade Counseling: Rhofade is a topical medication which can decrease superficial blood flow where applied. Side effects are uncommon and include stinging, redness and allergic reactions.
Birth Control Pills Counseling: Birth Control Pill Counseling: I discussed with the patient the potential side effects of OCPs including but not limited to increased risk of stroke, heart attack, thrombophlebitis, deep venous thrombosis, hepatic adenomas, breast changes, GI upset, headaches, and depression.  The patient verbalized understanding of the proper use and possible adverse effects of OCPs. All of the patient's questions and concerns were addressed.
Propranolol Pregnancy And Lactation Text: This medication is Pregnancy Category C and it isn't known if it is safe during pregnancy. It is excreted in breast milk.
Stelara Counseling:  I discussed with the patient the risks of ustekinumab including but not limited to immunosuppression, malignancy, posterior leukoencephalopathy syndrome, and serious infections.  The patient understands that monitoring is required including a PPD at baseline and must alert us or the primary physician if symptoms of infection or other concerning signs are noted.
Glycopyrrolate Counseling:  I discussed with the patient the risks of glycopyrrolate including but not limited to skin rash, drowsiness, dry mouth, difficulty urinating, and blurred vision.
Cellcept Counseling:  I discussed with the patient the risks of mycophenolate mofetil including but not limited to infection/immunosuppression, GI upset, hypokalemia, hypercholesterolemia, bone marrow suppression, lymphoproliferative disorders, malignancy, GI ulceration/bleed/perforation, colitis, interstitial lung disease, kidney failure, progressive multifocal leukoencephalopathy, and birth defects.  The patient understands that monitoring is required including a baseline creatinine and regular CBC testing. In addition, patient must alert us immediately if symptoms of infection or other concerning signs are noted.
High Dose Vitamin A Pregnancy And Lactation Text: High dose vitamin A therapy is contraindicated during pregnancy and breast feeding.
Metronidazole Counseling:  I discussed with the patient the risks of metronidazole including but not limited to seizures, nausea/vomiting, a metallic taste in the mouth, nausea/vomiting and severe allergy.

## 2022-12-16 ENCOUNTER — OFFICE VISIT (OUTPATIENT)
Dept: INTERNAL MEDICINE | Facility: IMAGING CENTER | Age: 72
End: 2022-12-16
Payer: MEDICARE

## 2022-12-16 VITALS
RESPIRATION RATE: 14 BRPM | HEART RATE: 59 BPM | WEIGHT: 228 LBS | OXYGEN SATURATION: 96 % | TEMPERATURE: 97.7 F | BODY MASS INDEX: 28.35 KG/M2 | DIASTOLIC BLOOD PRESSURE: 70 MMHG | HEIGHT: 75 IN | SYSTOLIC BLOOD PRESSURE: 122 MMHG

## 2022-12-16 DIAGNOSIS — R74.8 ELEVATED CREATINE KINASE LEVEL: ICD-10-CM

## 2022-12-16 DIAGNOSIS — E03.9 HYPOTHYROIDISM (ACQUIRED): ICD-10-CM

## 2022-12-16 DIAGNOSIS — I65.21 ATHEROSCLEROSIS OF RIGHT CAROTID ARTERY: ICD-10-CM

## 2022-12-16 DIAGNOSIS — E78.00 HYPERCHOLESTEROLEMIA: ICD-10-CM

## 2022-12-16 DIAGNOSIS — Z86.39 HISTORY OF IRON DEFICIENCY: ICD-10-CM

## 2022-12-16 DIAGNOSIS — I70.0 ATHEROSCLEROSIS OF ABDOMINAL AORTA (HCC): ICD-10-CM

## 2022-12-16 DIAGNOSIS — Z79.899 LONG TERM USE OF DRUG: ICD-10-CM

## 2022-12-16 DIAGNOSIS — E88.819 INSULIN RESISTANCE: ICD-10-CM

## 2022-12-16 PROCEDURE — 99214 OFFICE O/P EST MOD 30 MIN: CPT | Performed by: INTERNAL MEDICINE

## 2022-12-16 ASSESSMENT — FIBROSIS 4 INDEX: FIB4 SCORE: 2.42
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
## 2022-12-20 ENCOUNTER — APPOINTMENT (RX ONLY)
Dept: URBAN - METROPOLITAN AREA CLINIC 36 | Facility: CLINIC | Age: 72
Setting detail: DERMATOLOGY
End: 2022-12-20

## 2022-12-20 DIAGNOSIS — Z48.02 ENCOUNTER FOR REMOVAL OF SUTURES: ICD-10-CM

## 2022-12-20 DIAGNOSIS — Z41.9 ENCOUNTER FOR PROCEDURE FOR PURPOSES OTHER THAN REMEDYING HEALTH STATE, UNSPECIFIED: ICD-10-CM

## 2022-12-20 PROCEDURE — ? CO2RE

## 2022-12-20 PROCEDURE — ? SUTURE REMOVAL (GLOBAL PERIOD)

## 2022-12-20 ASSESSMENT — LOCATION ZONE DERM: LOCATION ZONE: FACE

## 2022-12-20 ASSESSMENT — LOCATION DETAILED DESCRIPTION DERM: LOCATION DETAILED: LEFT SUPERIOR MEDIAL MALAR CHEEK

## 2022-12-20 ASSESSMENT — LOCATION SIMPLE DESCRIPTION DERM: LOCATION SIMPLE: LEFT CHEEK

## 2022-12-20 NOTE — PROCEDURE: MIPS QUALITY
Quality 402: Tobacco Use And Help With Quitting Among Adolescents: Patient screened for tobacco and never smoked
Quality 431: Preventive Care And Screening: Unhealthy Alcohol Use - Screening: Patient not identified as an unhealthy alcohol user when screened for unhealthy alcohol use using a systematic screening method
Quality 130: Documentation Of Current Medications In The Medical Record: Current Medications Documented
Detail Level: Detailed
Quality 111:Pneumonia Vaccination Status For Older Adults: Pneumococcal vaccine (PPSV23) administered on or after patient’s 60th birthday and before the end of the measurement period

## 2022-12-20 NOTE — PROCEDURE: SUTURE REMOVAL (GLOBAL PERIOD)
Detail Level: Detailed
Add 61296 Cpt? (Important Note: In 2017 The Use Of 96443 Is Being Tracked By Cms To Determine Future Global Period Reimbursement For Global Periods): no

## 2022-12-20 NOTE — PROCEDURE: CO2RE
Other Location: right cheek
External Cooling Fan Speed: 5
Add Another Laser Setting?: no
Treatment Number: 1
Anesthesia Type: 1% lidocaine with epinephrine
Post-Care Instructions: I reviewed with the patient in detail post-care instructions. Patient should avoid sun until area fully healed. Pt should apply vaseline to treated areas, and remove crusts gently with water-vinegar soaks.
Core Energy (Mj): 68
Detail Level: Simple
Consent: Written consent obtained, risks reviewed including but not limited to crusting, scabbing, blistering, scarring, darker or lighter pigmentary change, incomplete improvement of dyschromia, wrinkles, prolonged erythema and facial swelling, infection and bleeding.
Eye Protection Text: A corneal shield was inserted after appropriate application of topical anesthesia.
Price (Use Numbers Only, No Special Characters Or $): 0
Other Location: right lower leg

## 2022-12-28 ENCOUNTER — APPOINTMENT (RX ONLY)
Dept: URBAN - METROPOLITAN AREA CLINIC 36 | Facility: CLINIC | Age: 72
Setting detail: DERMATOLOGY
End: 2022-12-28

## 2022-12-28 DIAGNOSIS — Z48.817 ENCOUNTER FOR SURGICAL AFTERCARE FOLLOWING SURGERY ON THE SKIN AND SUBCUTANEOUS TISSUE: ICD-10-CM

## 2022-12-28 PROCEDURE — ? POST-OP WOUND CHECK

## 2022-12-28 ASSESSMENT — LOCATION DETAILED DESCRIPTION DERM: LOCATION DETAILED: LEFT MEDIAL MALAR CHEEK

## 2022-12-28 ASSESSMENT — LOCATION SIMPLE DESCRIPTION DERM: LOCATION SIMPLE: LEFT CHEEK

## 2022-12-28 ASSESSMENT — LOCATION ZONE DERM: LOCATION ZONE: FACE

## 2022-12-28 NOTE — PROCEDURE: POST-OP WOUND CHECK
Detail Level: Detailed
Add 55587 Cpt? (Important Note: In 2017 The Use Of 46040 Is Being Tracked By Cms To Determine Future Global Period Reimbursement For Global Periods): no

## 2023-01-17 DIAGNOSIS — J32.9 CHRONIC SINUSITIS, UNSPECIFIED LOCATION: ICD-10-CM

## 2023-01-19 ENCOUNTER — APPOINTMENT (RX ONLY)
Dept: URBAN - METROPOLITAN AREA CLINIC 36 | Facility: CLINIC | Age: 73
Setting detail: DERMATOLOGY
End: 2023-01-19

## 2023-01-19 DIAGNOSIS — Z41.9 ENCOUNTER FOR PROCEDURE FOR PURPOSES OTHER THAN REMEDYING HEALTH STATE, UNSPECIFIED: ICD-10-CM

## 2023-01-19 PROCEDURE — ? FRAXEL

## 2023-01-19 NOTE — PROCEDURE: FRAXEL
Treatment Level: 1
Indication: surgical scars
Large Plastic Eye Shield Text: The ocular mucosa was anesthetized with tetracaine. Once adequate anesthesia was optained, large plastic eye shields were inserted and remained in place until the procedure was completed.
Price (Use Numbers Only, No Special Characters Or $): 0
Post-Care Instructions: I reviewed with the patient in detail post-care instructions. Patient should avoid sun until area fully healed.
Medium Metal Eye Shield Text: The ocular mucosa was anesthetized with tetracaine. Once adequate anesthesia was optained, medium metal eye shields were inserted and remained in place until the procedure was completed.
Location: left cheek
Wavelength: 1550nm
Energy(Mj/Cm2): 70
Treatment Level: 4
Large Metal Eye Shield Text: The ocular mucosa was anesthetized with tetracaine. Once adequate anesthesia was optained, large metal eye shields were inserted and remained in place until the procedure was completed.
Small Plastic Eye Shield Text: The ocular mucosa was anesthetized with tetracaine. Once adequate anesthesia was optained, small plastic eye shields were inserted and remained in place until the procedure was completed.
Was An Eye Shield Used?: No
Detail Level: Zone
Consent: Written consent obtained, risks reviewed including but not limited to pain and incomplete improvement.
Medium Plastic Eye Shield Text: The ocular mucosa was anesthetized with tetracaine. Once adequate anesthesia was optained, medium plastic eye shields were inserted and remained in place until the procedure was completed.
External Cooling Fan Speed: 5
Small Metal Eye Shield Text: The ocular mucosa was anesthetized with tetracaine. Once adequate anesthesia was optained, small metal eye shields were inserted and remained in place until the procedure was completed.

## 2023-02-08 ENCOUNTER — APPOINTMENT (RX ONLY)
Dept: URBAN - METROPOLITAN AREA CLINIC 36 | Facility: CLINIC | Age: 73
Setting detail: DERMATOLOGY
End: 2023-02-08

## 2023-02-08 DIAGNOSIS — Z48.817 ENCOUNTER FOR SURGICAL AFTERCARE FOLLOWING SURGERY ON THE SKIN AND SUBCUTANEOUS TISSUE: ICD-10-CM

## 2023-02-08 DIAGNOSIS — Z41.9 ENCOUNTER FOR PROCEDURE FOR PURPOSES OTHER THAN REMEDYING HEALTH STATE, UNSPECIFIED: ICD-10-CM

## 2023-02-08 PROCEDURE — ? POST-OP WOUND CHECK

## 2023-02-08 PROCEDURE — ? CO2RE

## 2023-02-08 ASSESSMENT — LOCATION DETAILED DESCRIPTION DERM: LOCATION DETAILED: LEFT MEDIAL MALAR CHEEK

## 2023-02-08 ASSESSMENT — LOCATION SIMPLE DESCRIPTION DERM: LOCATION SIMPLE: LEFT CHEEK

## 2023-02-08 ASSESSMENT — LOCATION ZONE DERM: LOCATION ZONE: FACE

## 2023-02-08 NOTE — PROCEDURE: CO2RE
Detail Level: Simple
Consent: Written consent obtained, risks reviewed including but not limited to crusting, scabbing, blistering, scarring, darker or lighter pigmentary change, incomplete improvement of dyschromia, wrinkles, prolonged erythema and facial swelling, infection and bleeding.
Treatment Number: 1
Add Another Laser Setting?: no
Eye Protection Text: A corneal shield was inserted after appropriate application of topical anesthesia.
Other Location: left cheek
Anesthesia Type: 1% lidocaine with epinephrine
Core Energy (Mj): 68
External Cooling Fan Speed: 5
Other Location: right lower leg
Post-Care Instructions: I reviewed with the patient in detail post-care instructions. Patient should avoid sun until area fully healed. Pt should apply vaseline to treated areas, and remove crusts gently with water-vinegar soaks.
Treatment Number: 2
Price (Use Numbers Only, No Special Characters Or $): 0

## 2023-02-08 NOTE — PROCEDURE: POST-OP WOUND CHECK
Body Location Override (Optional - Billing Will Still Be Based On Selected Body Map Location If Applicable): left cheek
Detail Level: Detailed
Add 83541 Cpt? (Important Note: In 2017 The Use Of 31745 Is Being Tracked By Cms To Determine Future Global Period Reimbursement For Global Periods): no
Wound Evaluated By: MAXWELL

## 2023-02-08 NOTE — PROCEDURE: MIPS QUALITY
Detail Level: Detailed
Quality 111:Pneumonia Vaccination Status For Older Adults: Pneumococcal vaccine (PPSV23) administered on or after patient’s 60th birthday and before the end of the measurement period
Quality 130: Documentation Of Current Medications In The Medical Record: Current Medications Documented
Quality 402: Tobacco Use And Help With Quitting Among Adolescents: Patient screened for tobacco and never smoked
Quality 431: Preventive Care And Screening: Unhealthy Alcohol Use - Screening: Patient not identified as an unhealthy alcohol user when screened for unhealthy alcohol use using a systematic screening method

## 2023-03-14 DIAGNOSIS — E03.9 HYPOTHYROIDISM (ACQUIRED): ICD-10-CM

## 2023-03-14 RX ORDER — LEVOTHYROXINE SODIUM 0.07 MG/1
75 TABLET ORAL
Qty: 90 TABLET | Refills: 1 | Status: SHIPPED | OUTPATIENT
Start: 2023-03-14 | End: 2023-09-19

## 2023-03-16 ENCOUNTER — APPOINTMENT (RX ONLY)
Dept: URBAN - METROPOLITAN AREA CLINIC 36 | Facility: CLINIC | Age: 73
Setting detail: DERMATOLOGY
End: 2023-03-16

## 2023-03-16 DIAGNOSIS — Z41.9 ENCOUNTER FOR PROCEDURE FOR PURPOSES OTHER THAN REMEDYING HEALTH STATE, UNSPECIFIED: ICD-10-CM

## 2023-03-16 DIAGNOSIS — Z48.817 ENCOUNTER FOR SURGICAL AFTERCARE FOLLOWING SURGERY ON THE SKIN AND SUBCUTANEOUS TISSUE: ICD-10-CM

## 2023-03-16 PROCEDURE — ? POST-OP WOUND CHECK

## 2023-03-16 PROCEDURE — ? FRAXEL

## 2023-03-16 ASSESSMENT — LOCATION DETAILED DESCRIPTION DERM: LOCATION DETAILED: LEFT CENTRAL MALAR CHEEK

## 2023-03-16 ASSESSMENT — LOCATION ZONE DERM: LOCATION ZONE: FACE

## 2023-03-16 ASSESSMENT — LOCATION SIMPLE DESCRIPTION DERM: LOCATION SIMPLE: LEFT CHEEK

## 2023-03-16 NOTE — PROCEDURE: FRAXEL
Treatment Level: 1
Small Metal Eye Shield Text: The ocular mucosa was anesthetized with tetracaine. Once adequate anesthesia was optained, small metal eye shields were inserted and remained in place until the procedure was completed.
Wavelength: 1550nm
Medium Plastic Eye Shield Text: The ocular mucosa was anesthetized with tetracaine. Once adequate anesthesia was optained, medium plastic eye shields were inserted and remained in place until the procedure was completed.
Large Plastic Eye Shield Text: The ocular mucosa was anesthetized with tetracaine. Once adequate anesthesia was optained, large plastic eye shields were inserted and remained in place until the procedure was completed.
Post-Care Instructions: I reviewed with the patient in detail post-care instructions. Patient should avoid sun until area fully healed.
Medium Metal Eye Shield Text: The ocular mucosa was anesthetized with tetracaine. Once adequate anesthesia was optained, medium metal eye shields were inserted and remained in place until the procedure was completed.
Treatment Number: 3
Energy(Mj/Cm2): 70
Location: left cheek
Large Metal Eye Shield Text: The ocular mucosa was anesthetized with tetracaine. Once adequate anesthesia was optained, large metal eye shields were inserted and remained in place until the procedure was completed.
External Cooling Fan Speed: 5
Detail Level: Zone
Number Of Passes: 4
Consent: Written consent obtained, risks reviewed including but not limited to pain and incomplete improvement.
Small Plastic Eye Shield Text: The ocular mucosa was anesthetized with tetracaine. Once adequate anesthesia was optained, small plastic eye shields were inserted and remained in place until the procedure was completed.
Price (Use Numbers Only, No Special Characters Or $): 0
Was An Eye Shield Used?: No
Indication: surgical scars

## 2023-03-16 NOTE — PROCEDURE: POST-OP WOUND CHECK
Detail Level: Detailed
Add 63953 Cpt? (Important Note: In 2017 The Use Of 70013 Is Being Tracked By Cms To Determine Future Global Period Reimbursement For Global Periods): no

## 2023-04-10 ENCOUNTER — NON-PROVIDER VISIT (OUTPATIENT)
Dept: INTERNAL MEDICINE | Facility: IMAGING CENTER | Age: 73
End: 2023-04-10
Payer: MEDICARE

## 2023-04-10 DIAGNOSIS — R30.0 DYSURIA: ICD-10-CM

## 2023-04-10 LAB
APPEARANCE UR: CLEAR
BILIRUB UR STRIP-MCNC: NEGATIVE MG/DL
COLOR UR AUTO: YELLOW
GLUCOSE UR STRIP.AUTO-MCNC: NEGATIVE MG/DL
KETONES UR STRIP.AUTO-MCNC: NEGATIVE MG/DL
LEUKOCYTE ESTERASE UR QL STRIP.AUTO: NEGATIVE
NITRITE UR QL STRIP.AUTO: NEGATIVE
PH UR STRIP.AUTO: 6.5 [PH] (ref 5–8)
PROT UR QL STRIP: NEGATIVE MG/DL
RBC UR QL AUTO: NEGATIVE
SP GR UR STRIP.AUTO: 1.02
UROBILINOGEN UR STRIP-MCNC: 0.2 MG/DL

## 2023-04-10 PROCEDURE — 81002 URINALYSIS NONAUTO W/O SCOPE: CPT | Performed by: FAMILY MEDICINE

## 2023-05-09 ENCOUNTER — OFFICE VISIT (OUTPATIENT)
Dept: INTERNAL MEDICINE | Facility: IMAGING CENTER | Age: 73
End: 2023-05-09
Payer: MEDICARE

## 2023-05-09 ENCOUNTER — HOSPITAL ENCOUNTER (OUTPATIENT)
Dept: RADIOLOGY | Facility: MEDICAL CENTER | Age: 73
End: 2023-05-09
Attending: FAMILY MEDICINE
Payer: MEDICARE

## 2023-05-09 VITALS
SYSTOLIC BLOOD PRESSURE: 128 MMHG | HEART RATE: 56 BPM | WEIGHT: 227.07 LBS | BODY MASS INDEX: 28.23 KG/M2 | RESPIRATION RATE: 17 BRPM | TEMPERATURE: 98.9 F | DIASTOLIC BLOOD PRESSURE: 62 MMHG | OXYGEN SATURATION: 98 % | HEIGHT: 75 IN

## 2023-05-09 DIAGNOSIS — I70.0 ATHEROSCLEROSIS OF ABDOMINAL AORTA (HCC): ICD-10-CM

## 2023-05-09 DIAGNOSIS — M79.671 FOOT PAIN, RIGHT: ICD-10-CM

## 2023-05-09 DIAGNOSIS — I73.9 PAD (PERIPHERAL ARTERY DISEASE) (HCC): ICD-10-CM

## 2023-05-09 PROCEDURE — 99213 OFFICE O/P EST LOW 20 MIN: CPT | Performed by: FAMILY MEDICINE

## 2023-05-09 PROCEDURE — 73620 X-RAY EXAM OF FOOT: CPT | Mod: RT

## 2023-05-09 RX ORDER — AZELASTINE 1 MG/ML
SPRAY, METERED NASAL
COMMUNITY
Start: 2023-02-08

## 2023-05-09 RX ORDER — MONTELUKAST SODIUM 10 MG/1
TABLET ORAL
COMMUNITY
Start: 2023-03-22

## 2023-05-09 ASSESSMENT — FIBROSIS 4 INDEX: FIB4 SCORE: 2.45

## 2023-05-09 ASSESSMENT — PATIENT HEALTH QUESTIONNAIRE - PHQ9: CLINICAL INTERPRETATION OF PHQ2 SCORE: 0

## 2023-05-09 NOTE — PROGRESS NOTES
Chief Complaint   Patient presents with    Foot Pain     Right lateral foot pain - intermittent and lasts for about 1-3 minutes. This has been present for about 2-3 months, he reports no trauma to cause this.        HPI:  Patient is a 73 y.o. male established patient who presents today for evaluation of new right lateral edge foot pain that started approximately 2-3 months ago without known trauma. He reports the pain is sharp, brief (lasting 1-3 minutes as most), and intermittent without rhyme nor reason. He is able to wear his normal shoes/sandals and is ambulating well at this time. He denies associated right foot swelling nor overlying skin changes at this time.     Patient Active Problem List    Diagnosis Date Noted    Atherosclerosis of abdominal aorta, mild (US June 2022) 12/16/2022    History of recurrent UTI (urinary tract infection) 04/15/2022    Bladder retention of urine, chronic 04/15/2022    History of iron deficiency 11/16/2020    Bradycardia, sinus 03/03/2020    Family history of heart failure 07/27/2018    Decreased cardiac ejection fraction at 50% 06/29/2018    Atherosclerosis of right carotid artery, mild (US June 2022) 06/29/2018    Non-sustained ventricular tachycardia (HCC) 06/29/2018    Statin intolerance (high doses) 06/29/2018    PAD (peripheral artery disease) (HCC) 06/29/2018    Abnormal stress echocardiogram 06/29/2018    Family history of ischemic heart disease (IHD) 06/29/2018    Insulin resistance 05/29/2018    Thickened cIMT (Vascular age of >80 vs Chronological Age of 67, in 2018) 05/29/2018    Elevated creatine kinase level 05/29/2018    B12 deficiency 05/29/2018    Simple renal cyst, left 06/05/2017    Calculus of gallbladder without cholecystitis without obstruction 06/05/2017    Asymptomatic PVCs 06/05/2017    Allergic rhinitis 05/01/2017    Dupuytren's contracture of left hand 05/01/2017    S/P TURP (status post transurethral resection of prostate) 01/29/2016    Vitamin D  "deficiency 01/29/2016    1st degree AV block 07/14/2015    Hypothyroid 12/08/2014    Mixed hyperlipidemia with apolipoprotein E3 variant 11/24/2014    Diverticulosis of colon 10/08/2013    Hyperlipidemia     BPH with obstruction/lower urinary tract symptoms 11/07/2012       Past medical, surgical, family, and social history was reviewed and updated in Epic chart by me today.     Medications and allergies reviewed and updated in Epic chart by me today.     ROS:  Pertinent positives listed above in HPI. All other systems have been reviewed and are negative.    PE:   /62 (BP Location: Left arm, Patient Position: Sitting, BP Cuff Size: Adult)   Pulse (!) 56   Temp 37.2 °C (98.9 °F) (Temporal)   Resp 17   Ht 1.905 m (6' 3\")   Wt 103 kg (227 lb 1.2 oz)   SpO2 98%   BMI 28.38 kg/m²   Vital signs reviewed with patient.     Gen: Well developed; well nourished; no acute distress; age appropriate appearance   Extremities: No peripheral edema b/l LE extremities/ no clubbing nor cyanosis noted; right lateral foot edge has patricia prominence not present on left lateral foot with bilateral comparison  Skin: Warm and dry; no rashes noted   Neuro: No focal deficits noted   Psych: AAOx4; mood and affect are appropriate    A/P:  1. Foot pain, right  New condition present for the past 2-3 months as described above in HPI. Patient has anatomical difference on right lateral foot as compared to left foot, and I suspect lateral side of foot area is under constant friction from shoes and ski boots. Recommend patient initiate work up by obtaining right foot xrays, and I will contact patient when imaging results are available.   - DX-FOOT-2- RIGHT; Future    2. Atherosclerosis of abdominal aorta (HCC)  Stable per imaging completed in 2022. Will follow.     3. PAD (peripheral artery disease) (HCC)  Stable/ will follow     "

## 2023-06-05 DIAGNOSIS — E78.2 MIXED HYPERLIPIDEMIA WITH APOLIPOPROTEIN E3 VARIANT: ICD-10-CM

## 2023-06-06 RX ORDER — ROSUVASTATIN CALCIUM 5 MG/1
5 TABLET, COATED ORAL
Qty: 45 TABLET | Refills: 1 | Status: SHIPPED | OUTPATIENT
Start: 2023-06-06 | End: 2023-12-11

## 2023-06-08 DIAGNOSIS — E78.2 MIXED HYPERLIPIDEMIA WITH APOLIPOPROTEIN E3 VARIANT: ICD-10-CM

## 2023-06-23 ENCOUNTER — NON-PROVIDER VISIT (OUTPATIENT)
Dept: INTERNAL MEDICINE | Facility: IMAGING CENTER | Age: 73
End: 2023-06-23
Payer: MEDICARE

## 2023-06-23 ENCOUNTER — HOSPITAL ENCOUNTER (OUTPATIENT)
Facility: MEDICAL CENTER | Age: 73
End: 2023-06-23
Attending: FAMILY MEDICINE
Payer: MEDICARE

## 2023-06-23 DIAGNOSIS — R74.8 ELEVATED CK: ICD-10-CM

## 2023-06-23 DIAGNOSIS — Z86.39 HISTORY OF IRON DEFICIENCY: ICD-10-CM

## 2023-06-23 DIAGNOSIS — E03.9 HYPOTHYROIDISM (ACQUIRED): ICD-10-CM

## 2023-06-23 DIAGNOSIS — E55.9 VITAMIN D DEFICIENCY: ICD-10-CM

## 2023-06-23 DIAGNOSIS — E78.2 MIXED HYPERLIPIDEMIA WITH APOLIPOPROTEIN E3 VARIANT: ICD-10-CM

## 2023-06-23 DIAGNOSIS — Z01.89 ENCOUNTER FOR ROUTINE LABORATORY TESTING: ICD-10-CM

## 2023-06-23 DIAGNOSIS — I10 ESSENTIAL HYPERTENSION: ICD-10-CM

## 2023-06-23 DIAGNOSIS — R74.8 ELEVATED CREATINE KINASE LEVEL: ICD-10-CM

## 2023-06-23 DIAGNOSIS — R73.9 HYPERGLYCEMIA: ICD-10-CM

## 2023-06-23 LAB
25(OH)D3 SERPL-MCNC: 66 NG/ML (ref 30–100)
ALBUMIN SERPL BCP-MCNC: 4.4 G/DL (ref 3.2–4.9)
ALBUMIN/GLOB SERPL: 1.6 G/DL
ALP SERPL-CCNC: 56 U/L (ref 30–99)
ALT SERPL-CCNC: 17 U/L (ref 2–50)
ANION GAP SERPL CALC-SCNC: 12 MMOL/L (ref 7–16)
AST SERPL-CCNC: 23 U/L (ref 12–45)
BASOPHILS # BLD AUTO: 0.7 % (ref 0–1.8)
BASOPHILS # BLD: 0.04 K/UL (ref 0–0.12)
BILIRUB SERPL-MCNC: 0.8 MG/DL (ref 0.1–1.5)
BUN SERPL-MCNC: 22 MG/DL (ref 8–22)
CALCIUM ALBUM COR SERPL-MCNC: 8.9 MG/DL (ref 8.5–10.5)
CALCIUM SERPL-MCNC: 9.2 MG/DL (ref 8.5–10.5)
CHLORIDE SERPL-SCNC: 104 MMOL/L (ref 96–112)
CHOLEST SERPL-MCNC: 140 MG/DL (ref 100–199)
CK SERPL-CCNC: 236 U/L (ref 0–154)
CO2 SERPL-SCNC: 23 MMOL/L (ref 20–33)
CREAT SERPL-MCNC: 0.93 MG/DL (ref 0.5–1.4)
EOSINOPHIL # BLD AUTO: 0.2 K/UL (ref 0–0.51)
EOSINOPHIL NFR BLD: 3.6 % (ref 0–6.9)
ERYTHROCYTE [DISTWIDTH] IN BLOOD BY AUTOMATED COUNT: 46.4 FL (ref 35.9–50)
EST. AVERAGE GLUCOSE BLD GHB EST-MCNC: 114 MG/DL
GFR SERPLBLD CREATININE-BSD FMLA CKD-EPI: 88 ML/MIN/1.73 M 2
GLOBULIN SER CALC-MCNC: 2.8 G/DL (ref 1.9–3.5)
GLUCOSE SERPL-MCNC: 87 MG/DL (ref 65–99)
HBA1C MFR BLD: 5.6 % (ref 4–5.6)
HCT VFR BLD AUTO: 45.7 % (ref 42–52)
HDLC SERPL-MCNC: 41 MG/DL
HGB BLD-MCNC: 15.2 G/DL (ref 14–18)
IMM GRANULOCYTES # BLD AUTO: 0.01 K/UL (ref 0–0.11)
IMM GRANULOCYTES NFR BLD AUTO: 0.2 % (ref 0–0.9)
IRON SATN MFR SERPL: 16 % (ref 15–55)
IRON SERPL-MCNC: 62 UG/DL (ref 50–180)
LDLC SERPL CALC-MCNC: 79 MG/DL
LYMPHOCYTES # BLD AUTO: 1.27 K/UL (ref 1–4.8)
LYMPHOCYTES NFR BLD: 22.9 % (ref 22–41)
MCH RBC QN AUTO: 30.8 PG (ref 27–33)
MCHC RBC AUTO-ENTMCNC: 33.3 G/DL (ref 32.3–36.5)
MCV RBC AUTO: 92.7 FL (ref 81.4–97.8)
MONOCYTES # BLD AUTO: 0.48 K/UL (ref 0–0.85)
MONOCYTES NFR BLD AUTO: 8.6 % (ref 0–13.4)
NEUTROPHILS # BLD AUTO: 3.55 K/UL (ref 1.82–7.42)
NEUTROPHILS NFR BLD: 64 % (ref 44–72)
NRBC # BLD AUTO: 0 K/UL
NRBC BLD-RTO: 0 /100 WBC (ref 0–0.2)
PLATELET # BLD AUTO: 200 K/UL (ref 164–446)
PMV BLD AUTO: 11.6 FL (ref 9–12.9)
POTASSIUM SERPL-SCNC: 4.4 MMOL/L (ref 3.6–5.5)
PROT SERPL-MCNC: 7.2 G/DL (ref 6–8.2)
RBC # BLD AUTO: 4.93 M/UL (ref 4.7–6.1)
SODIUM SERPL-SCNC: 139 MMOL/L (ref 135–145)
T4 FREE SERPL-MCNC: 1.49 NG/DL (ref 0.93–1.7)
TIBC SERPL-MCNC: 394 UG/DL (ref 250–450)
TRIGL SERPL-MCNC: 99 MG/DL (ref 0–149)
TSH SERPL DL<=0.005 MIU/L-ACNC: 2.25 UIU/ML (ref 0.38–5.33)
UIBC SERPL-MCNC: 332 UG/DL (ref 110–370)
WBC # BLD AUTO: 5.6 K/UL (ref 4.8–10.8)

## 2023-06-23 PROCEDURE — 80053 COMPREHEN METABOLIC PANEL: CPT

## 2023-06-23 PROCEDURE — 82306 VITAMIN D 25 HYDROXY: CPT

## 2023-06-23 PROCEDURE — 83550 IRON BINDING TEST: CPT

## 2023-06-23 PROCEDURE — 84439 ASSAY OF FREE THYROXINE: CPT

## 2023-06-23 PROCEDURE — 84443 ASSAY THYROID STIM HORMONE: CPT

## 2023-06-23 PROCEDURE — 83540 ASSAY OF IRON: CPT

## 2023-06-23 PROCEDURE — 85025 COMPLETE CBC W/AUTO DIFF WBC: CPT

## 2023-06-23 PROCEDURE — 82550 ASSAY OF CK (CPK): CPT

## 2023-06-23 PROCEDURE — 80061 LIPID PANEL: CPT

## 2023-06-23 PROCEDURE — 83036 HEMOGLOBIN GLYCOSYLATED A1C: CPT | Mod: GA

## 2023-06-28 ENCOUNTER — APPOINTMENT (OUTPATIENT)
Dept: INTERNAL MEDICINE | Facility: IMAGING CENTER | Age: 73
End: 2023-06-28
Payer: MEDICARE

## 2023-07-05 ENCOUNTER — OFFICE VISIT (OUTPATIENT)
Dept: INTERNAL MEDICINE | Facility: IMAGING CENTER | Age: 73
End: 2023-07-05
Payer: MEDICARE

## 2023-07-05 VITALS
BODY MASS INDEX: 27.53 KG/M2 | DIASTOLIC BLOOD PRESSURE: 62 MMHG | HEART RATE: 57 BPM | OXYGEN SATURATION: 95 % | SYSTOLIC BLOOD PRESSURE: 122 MMHG | WEIGHT: 221.4 LBS | HEIGHT: 75 IN | RESPIRATION RATE: 17 BRPM | TEMPERATURE: 98.3 F

## 2023-07-05 DIAGNOSIS — N13.8 BPH WITH OBSTRUCTION/LOWER URINARY TRACT SYMPTOMS: ICD-10-CM

## 2023-07-05 DIAGNOSIS — M72.0 DUPUYTREN'S CONTRACTURE OF LEFT HAND: ICD-10-CM

## 2023-07-05 DIAGNOSIS — J30.9 ALLERGIC RHINITIS, UNSPECIFIED SEASONALITY, UNSPECIFIED TRIGGER: ICD-10-CM

## 2023-07-05 DIAGNOSIS — Z86.39 HISTORY OF IRON DEFICIENCY: ICD-10-CM

## 2023-07-05 DIAGNOSIS — N40.1 BPH WITH OBSTRUCTION/LOWER URINARY TRACT SYMPTOMS: ICD-10-CM

## 2023-07-05 DIAGNOSIS — R00.1 BRADYCARDIA, SINUS: ICD-10-CM

## 2023-07-05 DIAGNOSIS — Z12.11 COLON CANCER SCREENING: ICD-10-CM

## 2023-07-05 DIAGNOSIS — E78.2 MIXED HYPERLIPIDEMIA WITH APOLIPOPROTEIN E3 VARIANT: ICD-10-CM

## 2023-07-05 DIAGNOSIS — Z00.00 ENCOUNTER FOR MEDICARE ANNUAL WELLNESS EXAM: ICD-10-CM

## 2023-07-05 DIAGNOSIS — R74.8 ELEVATED CREATINE KINASE LEVEL: ICD-10-CM

## 2023-07-05 DIAGNOSIS — E03.9 ACQUIRED HYPOTHYROIDISM: ICD-10-CM

## 2023-07-05 DIAGNOSIS — E55.9 VITAMIN D DEFICIENCY: ICD-10-CM

## 2023-07-05 DIAGNOSIS — I65.21 ATHEROSCLEROSIS OF RIGHT CAROTID ARTERY: ICD-10-CM

## 2023-07-05 DIAGNOSIS — I73.9 PAD (PERIPHERAL ARTERY DISEASE) (HCC): ICD-10-CM

## 2023-07-05 DIAGNOSIS — Z78.9 SELF-CATHETERIZES URINARY BLADDER: ICD-10-CM

## 2023-07-05 DIAGNOSIS — R33.9 BLADDER RETENTION OF URINE: ICD-10-CM

## 2023-07-05 DIAGNOSIS — E88.819 INSULIN RESISTANCE: ICD-10-CM

## 2023-07-05 DIAGNOSIS — I70.0 ATHEROSCLEROSIS OF ABDOMINAL AORTA (HCC): ICD-10-CM

## 2023-07-05 DIAGNOSIS — R93.1 DECREASED CARDIAC EJECTION FRACTION: ICD-10-CM

## 2023-07-05 PROBLEM — N28.1 SIMPLE RENAL CYST: Status: RESOLVED | Noted: 2017-06-05 | Resolved: 2023-07-05

## 2023-07-05 PROCEDURE — G0439 PPPS, SUBSEQ VISIT: HCPCS | Performed by: FAMILY MEDICINE

## 2023-07-05 PROCEDURE — 3074F SYST BP LT 130 MM HG: CPT | Performed by: FAMILY MEDICINE

## 2023-07-05 PROCEDURE — 3078F DIAST BP <80 MM HG: CPT | Performed by: FAMILY MEDICINE

## 2023-07-05 ASSESSMENT — ACTIVITIES OF DAILY LIVING (ADL): BATHING_REQUIRES_ASSISTANCE: 0

## 2023-07-05 ASSESSMENT — PATIENT HEALTH QUESTIONNAIRE - PHQ9: CLINICAL INTERPRETATION OF PHQ2 SCORE: 0

## 2023-07-05 ASSESSMENT — ENCOUNTER SYMPTOMS: GENERAL WELL-BEING: EXCELLENT

## 2023-07-05 ASSESSMENT — FIBROSIS 4 INDEX: FIB4 SCORE: 1.98

## 2023-07-06 ENCOUNTER — APPOINTMENT (OUTPATIENT)
Dept: INTERNAL MEDICINE | Facility: IMAGING CENTER | Age: 73
End: 2023-07-06
Payer: MEDICARE

## 2023-07-06 PROBLEM — E53.8 B12 DEFICIENCY: Status: RESOLVED | Noted: 2018-05-29 | Resolved: 2023-07-06

## 2023-07-06 PROBLEM — I47.29 NON-SUSTAINED VENTRICULAR TACHYCARDIA (HCC): Status: RESOLVED | Noted: 2018-06-29 | Resolved: 2023-07-06
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
## 2023-07-21 DIAGNOSIS — R00.1 BRADYCARDIA, SINUS: ICD-10-CM

## 2023-07-21 DIAGNOSIS — R93.1 DECREASED CARDIAC EJECTION FRACTION: ICD-10-CM

## 2023-08-16 ENCOUNTER — APPOINTMENT (RX ONLY)
Dept: URBAN - METROPOLITAN AREA CLINIC 31 | Facility: CLINIC | Age: 73
Setting detail: DERMATOLOGY
End: 2023-08-16

## 2023-08-16 DIAGNOSIS — Z71.89 OTHER SPECIFIED COUNSELING: ICD-10-CM

## 2023-08-16 DIAGNOSIS — L57.0 ACTINIC KERATOSIS: ICD-10-CM

## 2023-08-16 DIAGNOSIS — Z85.828 PERSONAL HISTORY OF OTHER MALIGNANT NEOPLASM OF SKIN: ICD-10-CM

## 2023-08-16 DIAGNOSIS — L81.4 OTHER MELANIN HYPERPIGMENTATION: ICD-10-CM

## 2023-08-16 DIAGNOSIS — L82.1 OTHER SEBORRHEIC KERATOSIS: ICD-10-CM

## 2023-08-16 DIAGNOSIS — D18.0 HEMANGIOMA: ICD-10-CM

## 2023-08-16 DIAGNOSIS — D22 MELANOCYTIC NEVI: ICD-10-CM

## 2023-08-16 PROBLEM — D22.61 MELANOCYTIC NEVI OF RIGHT UPPER LIMB, INCLUDING SHOULDER: Status: ACTIVE | Noted: 2023-08-16

## 2023-08-16 PROBLEM — D22.71 MELANOCYTIC NEVI OF RIGHT LOWER LIMB, INCLUDING HIP: Status: ACTIVE | Noted: 2023-08-16

## 2023-08-16 PROBLEM — D18.01 HEMANGIOMA OF SKIN AND SUBCUTANEOUS TISSUE: Status: ACTIVE | Noted: 2023-08-16

## 2023-08-16 PROBLEM — D22.72 MELANOCYTIC NEVI OF LEFT LOWER LIMB, INCLUDING HIP: Status: ACTIVE | Noted: 2023-08-16

## 2023-08-16 PROBLEM — D48.5 NEOPLASM OF UNCERTAIN BEHAVIOR OF SKIN: Status: ACTIVE | Noted: 2023-08-16

## 2023-08-16 PROBLEM — D22.5 MELANOCYTIC NEVI OF TRUNK: Status: ACTIVE | Noted: 2023-08-16

## 2023-08-16 PROBLEM — D22.62 MELANOCYTIC NEVI OF LEFT UPPER LIMB, INCLUDING SHOULDER: Status: ACTIVE | Noted: 2023-08-16

## 2023-08-16 PROCEDURE — ? BIOPSY BY SHAVE METHOD

## 2023-08-16 PROCEDURE — ? LIQUID NITROGEN

## 2023-08-16 PROCEDURE — 99213 OFFICE O/P EST LOW 20 MIN: CPT | Mod: 25

## 2023-08-16 PROCEDURE — 17000 DESTRUCT PREMALG LESION: CPT | Mod: 59

## 2023-08-16 PROCEDURE — ? COUNSELING

## 2023-08-16 PROCEDURE — 17003 DESTRUCT PREMALG LES 2-14: CPT

## 2023-08-16 PROCEDURE — 11102 TANGNTL BX SKIN SINGLE LES: CPT

## 2023-08-16 ASSESSMENT — LOCATION DETAILED DESCRIPTION DERM
LOCATION DETAILED: LEFT FOREHEAD
LOCATION DETAILED: INFERIOR THORACIC SPINE
LOCATION DETAILED: LEFT PROXIMAL POSTERIOR UPPER ARM
LOCATION DETAILED: LEFT RADIAL DORSAL HAND
LOCATION DETAILED: RIGHT POSTERIOR SHOULDER
LOCATION DETAILED: RIGHT ANTERIOR DISTAL UPPER ARM
LOCATION DETAILED: RIGHT CENTRAL PARIETAL SCALP
LOCATION DETAILED: LEFT VENTRAL DISTAL FOREARM
LOCATION DETAILED: EPIGASTRIC SKIN
LOCATION DETAILED: RIGHT PROXIMAL DORSAL FOREARM
LOCATION DETAILED: RIGHT INFERIOR MEDIAL UPPER BACK
LOCATION DETAILED: LEFT ANTERIOR DISTAL THIGH
LOCATION DETAILED: PERIUMBILICAL SKIN
LOCATION DETAILED: RIGHT VENTRAL DISTAL FOREARM
LOCATION DETAILED: RIGHT CENTRAL MALAR CHEEK
LOCATION DETAILED: RIGHT ULNAR DORSAL HAND
LOCATION DETAILED: RIGHT ANTERIOR DISTAL THIGH
LOCATION DETAILED: LEFT ANTERIOR DISTAL UPPER ARM
LOCATION DETAILED: LEFT LATERAL MALAR CHEEK
LOCATION DETAILED: LEFT POSTERIOR SHOULDER
LOCATION DETAILED: RIGHT PROXIMAL CALF
LOCATION DETAILED: RIGHT PROXIMAL POSTERIOR UPPER ARM
LOCATION DETAILED: LEFT SUPERIOR PARIETAL SCALP
LOCATION DETAILED: RIGHT SUPERIOR MEDIAL MIDBACK
LOCATION DETAILED: LEFT PROXIMAL CALF
LOCATION DETAILED: LEFT MEDIAL MALAR CHEEK
LOCATION DETAILED: LEFT CENTRAL FRONTAL SCALP
LOCATION DETAILED: LEFT SUPERIOR MEDIAL MALAR CHEEK
LOCATION DETAILED: LEFT PROXIMAL DORSAL FOREARM

## 2023-08-16 ASSESSMENT — LOCATION ZONE DERM
LOCATION ZONE: SCALP
LOCATION ZONE: LEG
LOCATION ZONE: HAND
LOCATION ZONE: TRUNK
LOCATION ZONE: ARM
LOCATION ZONE: FACE

## 2023-08-16 ASSESSMENT — LOCATION SIMPLE DESCRIPTION DERM
LOCATION SIMPLE: RIGHT LOWER BACK
LOCATION SIMPLE: LEFT HAND
LOCATION SIMPLE: UPPER BACK
LOCATION SIMPLE: RIGHT UPPER BACK
LOCATION SIMPLE: LEFT FOREHEAD
LOCATION SIMPLE: SCALP
LOCATION SIMPLE: RIGHT HAND
LOCATION SIMPLE: LEFT SHOULDER
LOCATION SIMPLE: RIGHT CALF
LOCATION SIMPLE: LEFT CHEEK
LOCATION SIMPLE: RIGHT CHEEK
LOCATION SIMPLE: RIGHT UPPER ARM
LOCATION SIMPLE: LEFT SCALP
LOCATION SIMPLE: LEFT UPPER ARM
LOCATION SIMPLE: LEFT FOREARM
LOCATION SIMPLE: ABDOMEN
LOCATION SIMPLE: RIGHT SHOULDER
LOCATION SIMPLE: RIGHT FOREARM
LOCATION SIMPLE: LEFT CALF
LOCATION SIMPLE: LEFT THIGH
LOCATION SIMPLE: RIGHT THIGH

## 2023-08-31 ENCOUNTER — APPOINTMENT (RX ONLY)
Dept: URBAN - METROPOLITAN AREA CLINIC 31 | Facility: CLINIC | Age: 73
Setting detail: DERMATOLOGY
End: 2023-08-31

## 2023-08-31 DIAGNOSIS — D485 NEOPLASM OF UNCERTAIN BEHAVIOR OF SKIN: ICD-10-CM

## 2023-08-31 PROBLEM — D48.5 NEOPLASM OF UNCERTAIN BEHAVIOR OF SKIN: Status: ACTIVE | Noted: 2023-08-31

## 2023-08-31 PROBLEM — C44.519 BASAL CELL CARCINOMA OF SKIN OF OTHER PART OF TRUNK: Status: ACTIVE | Noted: 2023-08-31

## 2023-08-31 PROCEDURE — 11102 TANGNTL BX SKIN SINGLE LES: CPT | Mod: 59

## 2023-08-31 PROCEDURE — ? EXCISION

## 2023-08-31 PROCEDURE — 11603 EXC TR-EXT MAL+MARG 2.1-3 CM: CPT

## 2023-08-31 PROCEDURE — 13101 CMPLX RPR TRUNK 2.6-7.5 CM: CPT | Mod: 59

## 2023-08-31 PROCEDURE — ? BIOPSY BY SHAVE METHOD

## 2023-08-31 ASSESSMENT — LOCATION ZONE DERM: LOCATION ZONE: LEG

## 2023-08-31 ASSESSMENT — LOCATION SIMPLE DESCRIPTION DERM: LOCATION SIMPLE: LEFT CALF

## 2023-08-31 ASSESSMENT — LOCATION DETAILED DESCRIPTION DERM: LOCATION DETAILED: LEFT DISTAL CALF

## 2023-08-31 NOTE — PROCEDURE: EXCISION
Referring Physician (Optional): Obed KELLOGG
Previous Accession (Optional): [id number]
Date Of Previous Biopsy (Optional): 8/16/23
Size Of Lesion In Cm: 2.1
X Size Of Lesion In Cm (Optional): 1.1
Size Of Margin In Cm: 0.4
Anesthesia Volume In Cc: 0
Was An Eye Clamp Used?: No
Eye Clamp Note Details: An eye clamp was used during the procedure.
Excision Method: Fusiform
Saucerization Depth: dermis and superficial adipose tissue
Repair Type: Complex
Suturegard Retention Suture: 2-0 Nylon
Retention Suture Bite Size: 3 mm
Length To Time In Minutes Device Was In Place: 10
Number Of Hemigard Strips Per Side: 1
Intermediate / Complex Repair - Final Wound Length In Cm: 5.5
Complex Requirements: Extensive Undermining Performed?: Yes
Width Of Defect Perpendicular To Closure In Cm (Required): 1.9
Distance Of Undermining In Cm (Required): 2.2
Undermining Type: Entire Wound
Debridement Text: The wound edges were debrided prior to proceeding with the closure to facilitate wound healing.
Helical Rim Text: The closure involved the helical rim.
Vermilion Border Text: The closure involved the vermilion border.
Nostril Rim Text: The closure involved the nostril rim.
Retention Suture Text: Retention sutures were placed to support the closure and prevent dehiscence.
Suture Removal: 14 days
Lab: [id number]
Lab Facility: 
Graft Donor Site Bandage (Optional-Leave Blank If You Don't Want In Note): Steri-strips and a pressure bandage were applied to the donor site.
Epidermal Closure Graft Donor Site (Optional): simple interrupted
Billing Type: Third-Party Bill
Excision Depth: adipose tissue
Scalpel Size: 15 blade
Anesthesia Type: 1% lidocaine with epinephrine
Additional Anesthesia Volume In Cc: 6
Hemostasis: Electrocautery
Estimated Blood Loss (Cc): minimal
Detail Level: Detailed
Deep Sutures: 3-0 Polysorb
Epidermal Sutures: 3-0 Surgipro
Wound Care: Petrolatum
Dressing: dry sterile dressing
Suturegard Intro: Intraoperative tissue expansion was performed, utilizing the SUTUREGARD device, in order to reduce wound tension.
Suturegard Body: The suture ends were repeatedly re-tightened and re-clamped to achieve the desired tissue expansion.
Hemigard Intro: Due to skin fragility and wound tension, it was decided to use HEMIGARD adhesive retention suture devices to permit a linear closure. The skin was cleaned and dried for a 6cm distance away from the wound. Excessive hair, if present, was removed to allow for adhesion.
Hemigard Postcare Instructions: The HEMIGARD strips are to remain completely dry for at least 5-7 days.
Positioning (Leave Blank If You Do Not Want): The patient was placed in a comfortable position exposing the surgical site.
Pre-Excision Curettage Text (Leave Blank If You Do Not Want): Prior to drawing the surgical margin the visible lesion was removed with curettage to clearly define the lesion size.
Complex Repair Preamble Text (Leave Blank If You Do Not Want): Extensive wide undermining was performed.
Intermediate Repair Preamble Text (Leave Blank If You Do Not Want): Undermining was performed with blunt dissection.
Curvilinear Excision Additional Text (Leave Blank If You Do Not Want): The margin was drawn around the clinically apparent lesion.  A curvilinear shape was then drawn on the skin incorporating the lesion and margins.  Incisions were then made along these lines to the appropriate tissue plane and the lesion was extirpated.
Fusiform Excision Additional Text (Leave Blank If You Do Not Want): The margin was drawn around the clinically apparent lesion.  A fusiform shape was then drawn on the skin incorporating the lesion and margins.  Incisions were then made along these lines to the appropriate tissue plane and the lesion was extirpated.
Elliptical Excision Additional Text (Leave Blank If You Do Not Want): The margin was drawn around the clinically apparent lesion.  An elliptical shape was then drawn on the skin incorporating the lesion and margins.  Incisions were then made along these lines to the appropriate tissue plane and the lesion was extirpated.
Saucerization Excision Additional Text (Leave Blank If You Do Not Want): The margin was drawn around the clinically apparent lesion.  Incisions were then made along these lines, in a tangential fashion, to the appropriate tissue plane and the lesion was extirpated.
Slit Excision Additional Text (Leave Blank If You Do Not Want): A linear line was drawn on the skin overlying the lesion. An incision was made slowly until the lesion was visualized.  Once visualized, the lesion was removed with blunt dissection.
Excisional Biopsy Additional Text (Leave Blank If You Do Not Want): The margin was drawn around the clinically apparent lesion. An elliptical shape was then drawn on the skin incorporating the lesion and margins.  Incisions were then made along these lines to the appropriate tissue plane and the lesion was extirpated.
Perilesional Excision Additional Text (Leave Blank If You Do Not Want): The margin was drawn around the clinically apparent lesion. Incisions were then made along these lines to the appropriate tissue plane and the lesion was extirpated.
Repair Performed By Another Provider Text (Leave Blank If You Do Not Want): After the tissue was excised the defect was repaired by another provider.
No Repair - Repaired With Adjacent Surgical Defect Text (Leave Blank If You Do Not Want): After the excision the defect was repaired concurrently with another surgical defect which was in close approximation.
Adjacent Tissue Transfer Text: The defect edges were debeveled with a #15 scalpel blade. Given the location of the defect and the proximity to free margins an adjacent tissue transfer was deemed most appropriate. Using a sterile surgical marker, an appropriate flap was drawn incorporating the defect and placing the expected incisions within the relaxed skin tension lines where possible. The area thus outlined was incised deep to adipose tissue with a #15 scalpel blade. The skin margins were undermined to an appropriate distance in all directions utilizing iris scissors and carried over to close the primary defect.
Advancement Flap (Single) Text: The defect edges were debeveled with a #15 scalpel blade. Given the location of the defect and the proximity to free margins a single advancement flap was deemed most appropriate. Using a sterile surgical marker, an appropriate advancement flap was drawn incorporating the defect and placing the expected incisions within the relaxed skin tension lines where possible. The area thus outlined was incised deep to adipose tissue with a #15 scalpel blade. The skin margins were undermined to an appropriate distance in all directions utilizing iris scissors. Following this, the designed flap was advanced and carried over into the primary defect and sutured into place.
Advancement Flap (Double) Text: The defect edges were debeveled with a #15 scalpel blade. Given the location of the defect and the proximity to free margins a double advancement flap was deemed most appropriate. Using a sterile surgical marker, the appropriate advancement flaps were drawn incorporating the defect and placing the expected incisions within the relaxed skin tension lines where possible. The area thus outlined was incised deep to adipose tissue with a #15 scalpel blade. The skin margins were undermined to an appropriate distance in all directions utilizing iris scissors. Following this, the designed flaps were advanced and carried over into the primary defect and sutured into place.
Burow's Advancement Flap Text: The defect edges were debeveled with a #15 scalpel blade. Given the location of the defect and the proximity to free margins a Burow's advancement flap was deemed most appropriate. Using a sterile surgical marker, the appropriate advancement flap was drawn incorporating the defect and placing the expected incisions within the relaxed skin tension lines where possible. The area thus outlined was incised deep to adipose tissue with a #15 scalpel blade. The skin margins were undermined to an appropriate distance in all directions utilizing iris scissors. Following this, the designed flap was advanced and carried over into the primary defect and sutured into place.
Chonodrocutaneous Helical Advancement Flap Text: The defect edges were debeveled with a #15 scalpel blade. Given the location of the defect and the proximity to free margins a chondrocutaneous helical advancement flap was deemed most appropriate. Using a sterile surgical marker, the appropriate advancement flap was drawn incorporating the defect and placing the expected incisions within the relaxed skin tension lines where possible. The area thus outlined was incised deep to adipose tissue with a #15 scalpel blade. The skin margins were undermined to an appropriate distance in all directions utilizing iris scissors. Following this, the designed flap was advanced and carried over into the primary defect and sutured into place.
Crescentic Advancement Flap Text: The defect edges were debeveled with a #15 scalpel blade. Given the location of the defect and the proximity to free margins a crescentic advancement flap was deemed most appropriate. Using a sterile surgical marker, the appropriate advancement flap was drawn incorporating the defect and placing the expected incisions within the relaxed skin tension lines where possible. The area thus outlined was incised deep to adipose tissue with a #15 scalpel blade. The skin margins were undermined to an appropriate distance in all directions utilizing iris scissors. Following this, the designed flap was advanced and carried over into the primary defect and sutured into place.
A-T Advancement Flap Text: The defect edges were debeveled with a #15 scalpel blade. Given the location of the defect, shape of the defect and the proximity to free margins an A-T advancement flap was deemed most appropriate. Using a sterile surgical marker, an appropriate advancement flap was drawn incorporating the defect and placing the expected incisions within the relaxed skin tension lines where possible. The area thus outlined was incised deep to adipose tissue with a #15 scalpel blade. The skin margins were undermined to an appropriate distance in all directions utilizing iris scissors. Following this, the designed flap was advanced and carried over into the primary defect and sutured into place.
O-T Advancement Flap Text: The defect edges were debeveled with a #15 scalpel blade. Given the location of the defect, shape of the defect and the proximity to free margins an O-T advancement flap was deemed most appropriate. Using a sterile surgical marker, an appropriate advancement flap was drawn incorporating the defect and placing the expected incisions within the relaxed skin tension lines where possible. The area thus outlined was incised deep to adipose tissue with a #15 scalpel blade. The skin margins were undermined to an appropriate distance in all directions utilizing iris scissors. Following this, the designed flap was advanced and carried over into the primary defect and sutured into place.
O-L Flap Text: The defect edges were debeveled with a #15 scalpel blade. Given the location of the defect, shape of the defect and the proximity to free margins an O-L flap was deemed most appropriate. Using a sterile surgical marker, an appropriate advancement flap was drawn incorporating the defect and placing the expected incisions within the relaxed skin tension lines where possible. The area thus outlined was incised deep to adipose tissue with a #15 scalpel blade. The skin margins were undermined to an appropriate distance in all directions utilizing iris scissors. Following this, the designed flap was advanced and carried over into the primary defect and sutured into place.
O-Z Flap Text: The defect edges were debeveled with a #15 scalpel blade. Given the location of the defect, shape of the defect and the proximity to free margins an O-Z flap was deemed most appropriate. Using a sterile surgical marker, an appropriate transposition flap was drawn incorporating the defect and placing the expected incisions within the relaxed skin tension lines where possible. The area thus outlined was incised deep to adipose tissue with a #15 scalpel blade. The skin margins were undermined to an appropriate distance in all directions utilizing iris scissors. Following this, the designed flap was carried over into the primary defect and sutured into place.
Double O-Z Flap Text: The defect edges were debeveled with a #15 scalpel blade. Given the location of the defect, shape of the defect and the proximity to free margins a Double O-Z flap was deemed most appropriate. Using a sterile surgical marker, an appropriate transposition flap was drawn incorporating the defect and placing the expected incisions within the relaxed skin tension lines where possible. The area thus outlined was incised deep to adipose tissue with a #15 scalpel blade. The skin margins were undermined to an appropriate distance in all directions utilizing iris scissors. Following this, the designed flap was carried over into the primary defect and sutured into place.
V-Y Flap Text: The defect edges were debeveled with a #15 scalpel blade. Given the location of the defect, shape of the defect and the proximity to free margins a V-Y flap was deemed most appropriate. Using a sterile surgical marker, an appropriate advancement flap was drawn incorporating the defect and placing the expected incisions within the relaxed skin tension lines where possible. The area thus outlined was incised deep to adipose tissue with a #15 scalpel blade. The skin margins were undermined to an appropriate distance in all directions utilizing iris scissors. Following this, the designed flap was advanced and carried over into the primary defect and sutured into place.
Advancement-Rotation Flap Text: The defect edges were debeveled with a #15 scalpel blade. Given the location of the defect, shape of the defect and the proximity to free margins an advancement-rotation flap was deemed most appropriate. Using a sterile surgical marker, an appropriate flap was drawn incorporating the defect and placing the expected incisions within the relaxed skin tension lines where possible. The area thus outlined was incised deep to adipose tissue with a #15 scalpel blade. The skin margins were undermined to an appropriate distance in all directions utilizing iris scissors. Following this, the designed flap was carried over into the primary defect and sutured into place.
Mercedes Flap Text: The defect edges were debeveled with a #15 scalpel blade. Given the location of the defect, shape of the defect and the proximity to free margins a Mercedes flap was deemed most appropriate. Using a sterile surgical marker, an appropriate advancement flap was drawn incorporating the defect and placing the expected incisions within the relaxed skin tension lines where possible. The area thus outlined was incised deep to adipose tissue with a #15 scalpel blade. The skin margins were undermined to an appropriate distance in all directions utilizing iris scissors. Following this, the designed flap was advanced and carried over into the primary defect and sutured into place.
Modified Advancement Flap Text: The defect edges were debeveled with a #15 scalpel blade. Given the location of the defect, shape of the defect and the proximity to free margins a modified advancement flap was deemed most appropriate. Using a sterile surgical marker, an appropriate advancement flap was drawn incorporating the defect and placing the expected incisions within the relaxed skin tension lines where possible. The area thus outlined was incised deep to adipose tissue with a #15 scalpel blade. The skin margins were undermined to an appropriate distance in all directions utilizing iris scissors. Following this, the designed flap was advanced and carried over into the primary defect and sutured into place.
Mucosal Advancement Flap Text: Given the location of the defect, shape of the defect and the proximity to free margins a mucosal advancement flap was deemed most appropriate. Incisions were made with a 15 blade scalpel in the appropriate fashion along the cutaneous vermilion border and the mucosal lip. The remaining actinically damaged mucosal tissue was excised.  The mucosal advancement flap was then elevated to the gingival sulcus with care taken to preserve the neurovascular structures and advanced into the primary defect. Care was taken to ensure that precise realignment of the vermilion border was achieved.
Peng Advancement Flap Text: The defect edges were debeveled with a #15 scalpel blade. Given the location of the defect, shape of the defect and the proximity to free margins a Peng advancement flap was deemed most appropriate. Using a sterile surgical marker, an appropriate advancement flap was drawn incorporating the defect and placing the expected incisions within the relaxed skin tension lines where possible. The area thus outlined was incised deep to adipose tissue with a #15 scalpel blade. The skin margins were undermined to an appropriate distance in all directions utilizing iris scissors. Following this, the designed flap was advanced and carried over into the primary defect and sutured into place.
Hatchet Flap Text: The defect edges were debeveled with a #15 scalpel blade. Given the location of the defect, shape of the defect and the proximity to free margins a hatchet flap was deemed most appropriate. Using a sterile surgical marker, an appropriate hatchet flap was drawn incorporating the defect and placing the expected incisions within the relaxed skin tension lines where possible. The area thus outlined was incised deep to adipose tissue with a #15 scalpel blade. The skin margins were undermined to an appropriate distance in all directions utilizing iris scissors. Following this, the designed flap was carried over into the primary defect and sutured into place.
Rotation Flap Text: The defect edges were debeveled with a #15 scalpel blade. Given the location of the defect, shape of the defect and the proximity to free margins a rotation flap was deemed most appropriate. Using a sterile surgical marker, an appropriate rotation flap was drawn incorporating the defect and placing the expected incisions within the relaxed skin tension lines where possible. The area thus outlined was incised deep to adipose tissue with a #15 scalpel blade. The skin margins were undermined to an appropriate distance in all directions utilizing iris scissors. Following this, the designed flap was carried over into the primary defect and sutured into place.
Bilateral Rotation Flap Text: The defect edges were debeveled with a #15 scalpel blade. Given the location of the defect, shape of the defect and the proximity to free margins a bilateral rotation flap was deemed most appropriate. Using a sterile surgical marker, an appropriate rotation flap was drawn incorporating the defect and placing the expected incisions within the relaxed skin tension lines where possible. The area thus outlined was incised deep to adipose tissue with a #15 scalpel blade. The skin margins were undermined to an appropriate distance in all directions utilizing iris scissors. Following this, the designed flap was carried over into the primary defect and sutured into place.
Spiral Flap Text: The defect edges were debeveled with a #15 scalpel blade. Given the location of the defect, shape of the defect and the proximity to free margins a spiral flap was deemed most appropriate. Using a sterile surgical marker, an appropriate rotation flap was drawn incorporating the defect and placing the expected incisions within the relaxed skin tension lines where possible. The area thus outlined was incised deep to adipose tissue with a #15 scalpel blade. The skin margins were undermined to an appropriate distance in all directions utilizing iris scissors. Following this, the designed flap was carried over into the primary defect and sutured into place.
Staged Advancement Flap Text: The defect edges were debeveled with a #15 scalpel blade. Given the location of the defect, shape of the defect and the proximity to free margins a staged advancement flap was deemed most appropriate. Using a sterile surgical marker, an appropriate advancement flap was drawn incorporating the defect and placing the expected incisions within the relaxed skin tension lines where possible. The area thus outlined was incised deep to adipose tissue with a #15 scalpel blade. The skin margins were undermined to an appropriate distance in all directions utilizing iris scissors. Following this, the designed flap was carried over into the primary defect and sutured into place.
Star Wedge Flap Text: The defect edges were debeveled with a #15 scalpel blade. Given the location of the defect, shape of the defect and the proximity to free margins a star wedge flap was deemed most appropriate. Using a sterile surgical marker, an appropriate rotation flap was drawn incorporating the defect and placing the expected incisions within the relaxed skin tension lines where possible. The area thus outlined was incised deep to adipose tissue with a #15 scalpel blade. The skin margins were undermined to an appropriate distance in all directions utilizing iris scissors. Following this, the designed flap was carried over into the primary defect and sutured into place.
Transposition Flap Text: The defect edges were debeveled with a #15 scalpel blade. Given the location of the defect and the proximity to free margins a transposition flap was deemed most appropriate. Using a sterile surgical marker, an appropriate transposition flap was drawn incorporating the defect. The area thus outlined was incised deep to adipose tissue with a #15 scalpel blade. The skin margins were undermined to an appropriate distance in all directions utilizing iris scissors. Following this, the designed flap was carried over into the primary defect and sutured into place.
Muscle Hinge Flap Text: The defect edges were debeveled with a #15 scalpel blade.  Given the size, depth and location of the defect and the proximity to free margins a muscle hinge flap was deemed most appropriate. Using a sterile surgical marker, an appropriate hinge flap was drawn incorporating the defect. The area thus outlined was incised with a #15 scalpel blade. The skin margins were undermined to an appropriate distance in all directions utilizing iris scissors. Following this, the designed flap was carried into the primary defect and sutured into place.
Mustarde Flap Text: The defect edges were debeveled with a #15 scalpel blade.  Given the size, depth and location of the defect and the proximity to free margins a Mustarde flap was deemed most appropriate. Using a sterile surgical marker, an appropriate flap was drawn incorporating the defect. The area thus outlined was incised with a #15 scalpel blade. The skin margins were undermined to an appropriate distance in all directions utilizing iris scissors. Following this, the designed flap was carried into the primary defect and sutured into place.
Nasal Turnover Hinge Flap Text: The defect edges were debeveled with a #15 scalpel blade.  Given the size, depth, location of the defect and the defect being full thickness a nasal turnover hinge flap was deemed most appropriate. Using a sterile surgical marker, an appropriate hinge flap was drawn incorporating the defect. The area thus outlined was incised with a #15 scalpel blade. The flap was designed to recreate the nasal mucosal lining and the alar rim. The skin margins were undermined to an appropriate distance in all directions utilizing iris scissors. Following this, the designed flap was carried over into the primary defect and sutured into place
Nasalis-Muscle-Based Myocutaneous Island Pedicle Flap Text: Using a #15 blade, an incision was made around the donor flap to the level of the nasalis muscle. Wide lateral undermining was then performed in both the subcutaneous plane above the nasalis muscle, and in a submuscular plane just above periosteum. This allowed the formation of a free nasalis muscle axial pedicle (based on the angular artery) which was still attached to the actual cutaneous flap, increasing its mobility and vascular viability. Hemostasis was obtained with pinpoint electrocoagulation. The flap was mobilized into position and the pivotal anchor points positioned and stabilized with buried interrupted sutures. Subcutaneous and dermal tissues were closed in a multilayered fashion with sutures. Tissue redundancies were excised, and the epidermal edges were apposed without significant tension and sutured with sutures.
Orbicularis Oris Muscle Flap Text: The defect edges were debeveled with a #15 scalpel blade.  Given that the defect affected the competency of the oral sphincter an orbicularis oris muscle flap was deemed most appropriate to restore this competency and normal muscle function.  Using a sterile surgical marker, an appropriate flap was drawn incorporating the defect. The area thus outlined was incised with a #15 scalpel blade. Following this, the designed flap was carried over into the primary defect and sutured into place.
Melolabial Transposition Flap Text: The defect edges were debeveled with a #15 scalpel blade. Given the location of the defect and the proximity to free margins a melolabial flap was deemed most appropriate. Using a sterile surgical marker, an appropriate melolabial transposition flap was drawn incorporating the defect. The area thus outlined was incised deep to adipose tissue with a #15 scalpel blade. The skin margins were undermined to an appropriate distance in all directions utilizing iris scissors. Following this, the designed flap was carried over into the primary defect and sutured into place.
Rhombic Flap Text: The defect edges were debeveled with a #15 scalpel blade. Given the location of the defect and the proximity to free margins a rhombic flap was deemed most appropriate. Using a sterile surgical marker, an appropriate rhombic flap was drawn incorporating the defect. The area thus outlined was incised deep to adipose tissue with a #15 scalpel blade. The skin margins were undermined to an appropriate distance in all directions utilizing iris scissors. Following this, the designed flap was carried over into the primary defect and sutured into place.
Rhomboid Transposition Flap Text: The defect edges were debeveled with a #15 scalpel blade. Given the location of the defect and the proximity to free margins a rhomboid transposition flap was deemed most appropriate. Using a sterile surgical marker, an appropriate rhomboid flap was drawn incorporating the defect. The area thus outlined was incised deep to adipose tissue with a #15 scalpel blade. The skin margins were undermined to an appropriate distance in all directions utilizing iris scissors. Following this, the designed flap was carried over into the primary defect and sutured into place.
Bi-Rhombic Flap Text: The defect edges were debeveled with a #15 scalpel blade. Given the location of the defect and the proximity to free margins a bi-rhombic flap was deemed most appropriate. Using a sterile surgical marker, an appropriate rhombic flap was drawn incorporating the defect. The area thus outlined was incised deep to adipose tissue with a #15 scalpel blade. The skin margins were undermined to an appropriate distance in all directions utilizing iris scissors. Following this, the designed flap was carried over into the primary defect and sutured into place.
Helical Rim Advancement Flap Text: The defect edges were debeveled with a #15 blade scalpel.  Given the location of the defect and the proximity to free margins (helical rim) a double helical rim advancement flap was deemed most appropriate. Using a sterile surgical marker, the appropriate advancement flaps were drawn incorporating the defect and placing the expected incisions between the helical rim and antihelix where possible.  The area thus outlined was incised through and through with a #15 scalpel blade.  With a skin hook and iris scissors, the flaps were gently and sharply undermined and freed up. Folllowing this, the designed flaps were carried over into the primary defect and sutured into place.
Bilateral Helical Rim Advancement Flap Text: The defect edges were debeveled with a #15 blade scalpel.  Given the location of the defect and the proximity to free margins (helical rim) a bilateral helical rim advancement flap was deemed most appropriate. Using a sterile surgical marker, the appropriate advancement flaps were drawn incorporating the defect and placing the expected incisions between the helical rim and antihelix where possible.  The area thus outlined was incised through and through with a #15 scalpel blade.  With a skin hook and iris scissors, the flaps were gently and sharply undermined and freed up. Following this, the designed flaps were placed into the primary defect and sutured into place.
Ear Star Wedge Flap Text: The defect edges were debeveled with a #15 blade scalpel.  Given the location of the defect and the proximity to free margins (helical rim) an ear star wedge flap was deemed most appropriate. Using a sterile surgical marker, the appropriate flap was drawn incorporating the defect and placing the expected incisions between the helical rim and antihelix where possible.  The area thus outlined was incised through and through with a #15 scalpel blade. Following this, the designed flap was carried over into the primary defect and sutured into place.
Banner Transposition Flap Text: The defect edges were debeveled with a #15 scalpel blade. Given the location of the defect and the proximity to free margins a Banner transposition flap was deemed most appropriate. Using a sterile surgical marker, an appropriate flap was drawn around the defect. The area thus outlined was incised deep to adipose tissue with a #15 scalpel blade. The skin margins were undermined to an appropriate distance in all directions utilizing iris scissors. Following this, the designed flap was carried into the primary defect and sutured into place.
Bilobed Flap Text: The defect edges were debeveled with a #15 scalpel blade. Given the location of the defect and the proximity to free margins a bilobe flap was deemed most appropriate. Using a sterile surgical marker, an appropriate bilobe flap drawn around the defect. The area thus outlined was incised deep to adipose tissue with a #15 scalpel blade. The skin margins were undermined to an appropriate distance in all directions utilizing iris scissors. Following this, the designed flap was carried over into the primary defect and sutured into place.
Bilobed Transposition Flap Text: The defect edges were debeveled with a #15 scalpel blade. Given the location of the defect and the proximity to free margins a bilobed transposition flap was deemed most appropriate. Using a sterile surgical marker, an appropriate bilobe flap drawn around the defect. The area thus outlined was incised deep to adipose tissue with a #15 scalpel blade. The skin margins were undermined to an appropriate distance in all directions utilizing iris scissors. Following this, the designed flap was carried over into the primary defect and sutured into place.
Trilobed Flap Text: The defect edges were debeveled with a #15 scalpel blade. Given the location of the defect and the proximity to free margins a trilobed flap was deemed most appropriate. Using a sterile surgical marker, an appropriate trilobed flap was drawn around the defect. The area thus outlined was incised deep to adipose tissue with a #15 scalpel blade. The skin margins were undermined to an appropriate distance in all directions utilizing iris scissors. Following this, the designed flap was carried into the primary defect and sutured into place.
Dorsal Nasal Flap Text: The defect edges were debeveled with a #15 scalpel blade. Given the location of the defect and the proximity to free margins a dorsal nasal flap was deemed most appropriate. Using a sterile surgical marker, an appropriate dorsal nasal flap was drawn around the defect. The area thus outlined was incised deep to adipose tissue with a #15 scalpel blade. The skin margins were undermined to an appropriate distance in all directions utilizing iris scissors. Following this, the designed flap was carried into the primary defect and sutured into place.
Island Pedicle Flap Text: The defect edges were debeveled with a #15 scalpel blade. Given the location of the defect, shape of the defect and the proximity to free margins an island pedicle advancement flap was deemed most appropriate. Using a sterile surgical marker, an appropriate advancement flap was drawn incorporating the defect, outlining the appropriate donor tissue and placing the expected incisions within the relaxed skin tension lines where possible. The area thus outlined was incised deep to adipose tissue with a #15 scalpel blade. The skin margins were undermined to an appropriate distance in all directions around the primary defect and laterally outward around the island pedicle utilizing iris scissors.  There was minimal undermining beneath the pedicle flap. Following this, the flap was carried over into the primary defect and sutured into place.
Island Pedicle Flap With Canthal Suspension Text: The defect edges were debeveled with a #15 scalpel blade. Given the location of the defect, shape of the defect and the proximity to free margins an island pedicle advancement flap was deemed most appropriate. Using a sterile surgical marker, an appropriate advancement flap was drawn incorporating the defect, outlining the appropriate donor tissue and placing the expected incisions within the relaxed skin tension lines where possible. The area thus outlined was incised deep to adipose tissue with a #15 scalpel blade. The skin margins were undermined to an appropriate distance in all directions around the primary defect and laterally outward around the island pedicle utilizing iris scissors.  There was minimal undermining beneath the pedicle flap. A suspension suture was placed in the canthal tendon to prevent tension and prevent ectropion. Following this, the designed flap was placed into the primary defect and sutured into place.
Alar Island Pedicle Flap Text: The defect edges were debeveled with a #15 scalpel blade. Given the location of the defect, shape of the defect and the proximity to the alar rim an island pedicle advancement flap was deemed most appropriate. Using a sterile surgical marker, an appropriate advancement flap was drawn incorporating the defect, outlining the appropriate donor tissue and placing the expected incisions within the nasal ala running parallel to the alar rim. The area thus outlined was incised with a #15 scalpel blade. The skin margins were undermined minimally to an appropriate distance in all directions around the primary defect and laterally outward around the island pedicle utilizing iris scissors.  There was minimal undermining beneath the pedicle flap. Following this, the designed flap was carried over into the primary defect and sutured into place.
Double Island Pedicle Flap Text: The defect edges were debeveled with a #15 scalpel blade. Given the location of the defect, shape of the defect and the proximity to free margins a double island pedicle advancement flap was deemed most appropriate. Using a sterile surgical marker, an appropriate advancement flap was drawn incorporating the defect, outlining the appropriate donor tissue and placing the expected incisions within the relaxed skin tension lines where possible. The area thus outlined was incised deep to adipose tissue with a #15 scalpel blade. The skin margins were undermined to an appropriate distance in all directions around the primary defect and laterally outward around the island pedicle utilizing iris scissors.  There was minimal undermining beneath the pedicle flap. Following this, the flap was carried over into the primary defect and sutured into place.
Island Pedicle Flap-Requiring Vessel Identification Text: The defect edges were debeveled with a #15 scalpel blade. Given the location of the defect, shape of the defect and the proximity to free margins an island pedicle advancement flap was deemed most appropriate. Using a sterile surgical marker, an appropriate advancement flap was drawn, based on the axial vessel mentioned above, incorporating the defect, outlining the appropriate donor tissue and placing the expected incisions within the relaxed skin tension lines where possible. The area thus outlined was incised deep to adipose tissue with a #15 scalpel blade. The skin margins were undermined to an appropriate distance in all directions around the primary defect and laterally outward around the island pedicle utilizing iris scissors.  There was minimal undermining beneath the pedicle flap. Following this, the designed flap was carried over into the primary defect and sutured into place.
Keystone Flap Text: The defect edges were debeveled with a #15 scalpel blade. Given the location of the defect, shape of the defect a keystone flap was deemed most appropriate. Using a sterile surgical marker, an appropriate keystone flap was drawn incorporating the defect, outlining the appropriate donor tissue and placing the expected incisions within the relaxed skin tension lines where possible. The area thus outlined was incised deep to adipose tissue with a #15 scalpel blade. The skin margins were undermined to an appropriate distance in all directions around the primary defect and laterally outward around the flap utilizing iris scissors. Following this, the designed flap was carried into the primary defect and sutured into place.
O-T Plasty Text: The defect edges were debeveled with a #15 scalpel blade. Given the location of the defect, shape of the defect and the proximity to free margins an O-T plasty was deemed most appropriate. Using a sterile surgical marker, an appropriate O-T plasty was drawn incorporating the defect and placing the expected incisions within the relaxed skin tension lines where possible. The area thus outlined was incised deep to adipose tissue with a #15 scalpel blade. The skin margins were undermined to an appropriate distance in all directions utilizing iris scissors. Following this, the designed flap was carried over into the primary defect and sutured into place.
O-Z Plasty Text: The defect edges were debeveled with a #15 scalpel blade. Given the location of the defect, shape of the defect and the proximity to free margins an O-Z plasty (double transposition flap) was deemed most appropriate. Using a sterile surgical marker, the appropriate transposition flaps were drawn incorporating the defect and placing the expected incisions within the relaxed skin tension lines where possible. The area thus outlined was incised deep to adipose tissue with a #15 scalpel blade. The skin margins were undermined to an appropriate distance in all directions utilizing iris scissors. Hemostasis was achieved with electrocautery. The flaps were then transposed and carried over into place, one clockwise and the other counterclockwise, and anchored with interrupted buried subcutaneous sutures.
Double O-Z Plasty Text: The defect edges were debeveled with a #15 scalpel blade. Given the location of the defect, shape of the defect and the proximity to free margins a Double O-Z plasty (double transposition flap) was deemed most appropriate. Using a sterile surgical marker, the appropriate transposition flaps were drawn incorporating the defect and placing the expected incisions within the relaxed skin tension lines where possible. The area thus outlined was incised deep to adipose tissue with a #15 scalpel blade. The skin margins were undermined to an appropriate distance in all directions utilizing iris scissors. Hemostasis was achieved with electrocautery. The flaps were then transposed and carried over into place, one clockwise and the other counterclockwise, and anchored with interrupted buried subcutaneous sutures.
V-Y Plasty Text: The defect edges were debeveled with a #15 scalpel blade. Given the location of the defect, shape of the defect and the proximity to free margins an V-Y advancement flap was deemed most appropriate. Using a sterile surgical marker, an appropriate advancement flap was drawn incorporating the defect and placing the expected incisions within the relaxed skin tension lines where possible. The area thus outlined was incised deep to adipose tissue with a #15 scalpel blade. The skin margins were undermined to an appropriate distance in all directions utilizing iris scissors. Following this, the designed flap was advanced and carried over into the primary defect and sutured into place.
H Plasty Text: Given the location of the defect, shape of the defect and the proximity to free margins a H-plasty was deemed most appropriate for repair. Using a sterile surgical marker, the appropriate advancement arms of the H-plasty were drawn incorporating the defect and placing the expected incisions within the relaxed skin tension lines where possible. The area thus outlined was incised deep to adipose tissue with a #15 scalpel blade. The skin margins were undermined to an appropriate distance in all directions utilizing iris scissors.  The opposing advancement arms were then advanced and carried over into place in opposite direction and anchored with interrupted buried subcutaneous sutures.
W Plasty Text: The lesion was extirpated to the level of the fat with a #15 scalpel blade. Given the location of the defect, shape of the defect and the proximity to free margins a W-plasty was deemed most appropriate for repair. Using a sterile surgical marker, the appropriate transposition arms of the W-plasty were drawn incorporating the defect and placing the expected incisions within the relaxed skin tension lines where possible. The area thus outlined was incised deep to adipose tissue with a #15 scalpel blade. The skin margins were undermined to an appropriate distance in all directions utilizing iris scissors. The opposing transposition arms were then transposed and carried over into place in opposite direction and anchored with interrupted buried subcutaneous sutures.
Z Plasty Text: The lesion was extirpated to the level of the fat with a #15 scalpel blade. Given the location of the defect, shape of the defect and the proximity to free margins a Z-plasty was deemed most appropriate for repair. Using a sterile surgical marker, the appropriate transposition arms of the Z-plasty were drawn incorporating the defect and placing the expected incisions within the relaxed skin tension lines where possible. The area thus outlined was incised deep to adipose tissue with a #15 scalpel blade. The skin margins were undermined to an appropriate distance in all directions utilizing iris scissors. The opposing transposition arms were then transposed and carried over into place in opposite direction and anchored with interrupted buried subcutaneous sutures.
Double Z Plasty Text: The lesion was extirpated to the level of the fat with a #15 scalpel blade. Given the location of the defect, shape of the defect and the proximity to free margins a double Z-plasty was deemed most appropriate for repair. Using a sterile surgical marker, the appropriate transposition arms of the double Z-plasty were drawn incorporating the defect and placing the expected incisions within the relaxed skin tension lines where possible. The area thus outlined was incised deep to adipose tissue with a #15 scalpel blade. The skin margins were undermined to an appropriate distance in all directions utilizing iris scissors. The opposing transposition arms were then transposed and carried over into place in opposite direction and anchored with interrupted buried subcutaneous sutures.
Zygomaticofacial Flap Text: Given the location of the defect, shape of the defect and the proximity to free margins a zygomaticofacial flap was deemed most appropriate for repair. Using a sterile surgical marker, the appropriate flap was drawn incorporating the defect and placing the expected incisions within the relaxed skin tension lines where possible. The area thus outlined was incised deep to adipose tissue with a #15 scalpel blade with preservation of a vascular pedicle.  The skin margins were undermined to an appropriate distance in all directions utilizing iris scissors. The flap was then carried over into the defect and anchored with interrupted buried subcutaneous sutures.
Cheek Interpolation Flap Text: A decision was made to reconstruct the defect utilizing an interpolation axial flap and a staged reconstruction.  A telfa template was made of the defect.  This telfa template was then used to outline the Cheek Interpolation flap.  The donor area for the pedicle flap was then injected with anesthesia.  The flap was excised through the skin and subcutaneous tissue down to the layer of the underlying musculature.  The interpolation flap was carefully excised within this deep plane to maintain its blood supply.  The edges of the donor site were undermined.   The donor site was closed in a primary fashion.  The pedicle was then rotated into position and sutured.  Once the tube was sutured into place, adequate blood supply was confirmed with blanching and refill.  The pedicle was then wrapped with xeroform gauze and dressed appropriately with a telfa and gauze bandage to ensure continued blood supply and protect the attached pedicle.
Cheek-To-Nose Interpolation Flap Text: A decision was made to reconstruct the defect utilizing an interpolation axial flap and a staged reconstruction.  A telfa template was made of the defect.  This telfa template was then used to outline the Cheek-To-Nose Interpolation flap.  The donor area for the pedicle flap was then injected with anesthesia.  The flap was excised through the skin and subcutaneous tissue down to the layer of the underlying musculature.  The interpolation flap was carefully excised within this deep plane to maintain its blood supply.  The edges of the donor site were undermined.   The donor site was closed in a primary fashion.  The pedicle was then rotated into position and sutured.  Once the tube was sutured into place, adequate blood supply was confirmed with blanching and refill.  The pedicle was then wrapped with xeroform gauze and dressed appropriately with a telfa and gauze bandage to ensure continued blood supply and protect the attached pedicle.
Interpolation Flap Text: A decision was made to reconstruct the defect utilizing an interpolation axial flap and a staged reconstruction.  A telfa template was made of the defect.  This telfa template was then used to outline the interpolation flap.  The donor area for the pedicle flap was then injected with anesthesia.  The flap was excised through the skin and subcutaneous tissue down to the layer of the underlying musculature.  The interpolation flap was carefully excised within this deep plane to maintain its blood supply.  The edges of the donor site were undermined.   The donor site was closed in a primary fashion.  The pedicle was then rotated into position and sutured.  Once the tube was sutured into place, adequate blood supply was confirmed with blanching and refill.  The pedicle was then wrapped with xeroform gauze and dressed appropriately with a telfa and gauze bandage to ensure continued blood supply and protect the attached pedicle.
Melolabial Interpolation Flap Text: A decision was made to reconstruct the defect utilizing an interpolation axial flap and a staged reconstruction.  A telfa template was made of the defect.  This telfa template was then used to outline the melolabial interpolation flap.  The donor area for the pedicle flap was then injected with anesthesia.  The flap was excised through the skin and subcutaneous tissue down to the layer of the underlying musculature.  The pedicle flap was carefully excised within this deep plane to maintain its blood supply.  The edges of the donor site were undermined.   The donor site was closed in a primary fashion.  The pedicle was then rotated into position and sutured.  Once the tube was sutured into place, adequate blood supply was confirmed with blanching and refill.  The pedicle was then wrapped with xeroform gauze and dressed appropriately with a telfa and gauze bandage to ensure continued blood supply and protect the attached pedicle.
Mastoid Interpolation Flap Text: A decision was made to reconstruct the defect utilizing an interpolation axial flap and a staged reconstruction.  A telfa template was made of the defect.  This telfa template was then used to outline the mastoid interpolation flap.  The donor area for the pedicle flap was then injected with anesthesia.  The flap was excised through the skin and subcutaneous tissue down to the layer of the underlying musculature.  The pedicle flap was carefully excised within this deep plane to maintain its blood supply.  The edges of the donor site were undermined.   The donor site was closed in a primary fashion.  The pedicle was then rotated into position and sutured.  Once the tube was sutured into place, adequate blood supply was confirmed with blanching and refill.  The pedicle was then wrapped with xeroform gauze and dressed appropriately with a telfa and gauze bandage to ensure continued blood supply and protect the attached pedicle.
Posterior Auricular Interpolation Flap Text: A decision was made to reconstruct the defect utilizing an interpolation axial flap and a staged reconstruction.  A telfa template was made of the defect.  This telfa template was then used to outline the posterior auricular interpolation flap.  The donor area for the pedicle flap was then injected with anesthesia.  The flap was excised through the skin and subcutaneous tissue down to the layer of the underlying musculature.  The pedicle flap was carefully excised within this deep plane to maintain its blood supply.  The edges of the donor site were undermined.   The donor site was closed in a primary fashion.  The pedicle was then rotated into position and sutured.  Once the tube was sutured into place, adequate blood supply was confirmed with blanching and refill.  The pedicle was then wrapped with xeroform gauze and dressed appropriately with a telfa and gauze bandage to ensure continued blood supply and protect the attached pedicle.
Paramedian Forehead Flap Text: A decision was made to reconstruct the defect utilizing an interpolation axial flap and a staged reconstruction.  A telfa template was made of the defect.  This telfa template was then used to outline the paramedian forehead pedicle flap.  The donor area for the pedicle flap was then injected with anesthesia.  The flap was excised through the skin and subcutaneous tissue down to the layer of the underlying musculature.  The pedicle flap was carefully excised within this deep plane to maintain its blood supply.  The edges of the donor site were undermined.   The donor site was closed in a primary fashion.  The pedicle was then rotated into position and sutured.  Once the tube was sutured into place, adequate blood supply was confirmed with blanching and refill.  The pedicle was then wrapped with xeroform gauze and dressed appropriately with a telfa and gauze bandage to ensure continued blood supply and protect the attached pedicle.
Abbe Flap (Upper To Lower Lip) Text: The defect of the lower lip was assessed and measured.  Given the location and size of the defect, an Abbe flap was deemed most appropriate. Using a sterile surgical marker, an appropriate Abbe flap was measured and drawn on the upper lip. Local anesthesia was then infiltrated.  A scalpel was then used to incise the upper lip through and through the skin, vermilion, muscle and mucosa, leaving the flap pedicled on the opposite side.  The flap was then rotated and transferred to the lower lip defect.  The flap was then sutured into place with a three layer technique, closing the orbicularis oris muscle layer with subcutaneous buried sutures, followed by a mucosal layer and an epidermal layer.
Abbe Flap (Lower To Upper Lip) Text: The defect of the upper lip was assessed and measured.  Given the location and size of the defect, an Abbe flap was deemed most appropriate. Using a sterile surgical marker, an appropriate Abbe flap was measured and drawn on the lower lip. Local anesthesia was then infiltrated. A scalpel was then used to incise the upper lip through and through the skin, vermilion, muscle and mucosa, leaving the flap pedicled on the opposite side.  The flap was then rotated and transferred to the lower lip defect.  The flap was then sutured into place with a three layer technique, closing the orbicularis oris muscle layer with subcutaneous buried sutures, followed by a mucosal layer and an epidermal layer.
Estlander Flap (Upper To Lower Lip) Text: The defect of the lower lip was assessed and measured.  Given the location and size of the defect, an Estlander flap was deemed most appropriate. Using a sterile surgical marker, an appropriate Estlander flap was measured and drawn on the upper lip. Local anesthesia was then infiltrated. A scalpel was then used to incise the lateral aspect of the flap, through skin, muscle and mucosa, leaving the flap pedicled medially.  The flap was then rotated and positioned to fill the lower lip defect.  The flap was then sutured into place with a three layer technique, closing the orbicularis oris muscle layer with subcutaneous buried sutures, followed by a mucosal layer and an epidermal layer.
Lip Wedge Excision Repair Text: Given the location of the defect and the proximity to free margins a full thickness wedge repair was deemed most appropriate. Using a sterile surgical marker, the appropriate repair was drawn incorporating the defect and placing the expected incisions perpendicular to the vermilion border.  The vermilion border was also meticulously outlined to ensure appropriate reapproximation during the repair.  The area thus outlined was incised through and through with a #15 scalpel blade.  The muscularis and dermis were reaproximated with deep sutures following hemostasis. Care was taken to realign the vermilion border before proceeding with the superficial closure.  Once the vermilion was realigned the superfical and mucosal closure was finished.
Ftsg Text: The defect edges were debeveled with a #15 scalpel blade. Given the location of the defect, shape of the defect and the proximity to free margins a full thickness skin graft was deemed most appropriate. Using a sterile surgical marker, the primary defect shape was transferred to the donor site. The area thus outlined was incised deep to adipose tissue with a #15 scalpel blade.  The harvested graft was then trimmed of adipose tissue until only dermis and epidermis was left.  The skin margins of the secondary defect were undermined to an appropriate distance in all directions utilizing iris scissors.  The secondary defect was closed with interrupted buried subcutaneous sutures.  The skin edges were then re-apposed with running  sutures.  The skin graft was then placed in the primary defect and oriented appropriately.
Split-Thickness Skin Graft Text: The defect edges were debeveled with a #15 scalpel blade. Given the location of the defect, shape of the defect and the proximity to free margins a split thickness skin graft was deemed most appropriate. Using a sterile surgical marker, the primary defect shape was transferred to the donor site. The split thickness graft was then harvested.  The skin graft was then placed in the primary defect and oriented appropriately.
Pinch Graft Text: The defect edges were debeveled with a #15 scalpel blade. Given the location of the defect, shape of the defect and the proximity to free margins a pinch graft was deemed most appropriate. Using a sterile surgical marker, the primary defect shape was transferred to the donor site. The area thus outlined was incised deep to adipose tissue with a #15 scalpel blade.  The harvested graft was then trimmed of adipose tissue until only dermis and epidermis was left. The skin margins of the secondary defect were undermined to an appropriate distance in all directions utilizing iris scissors.  The secondary defect was closed with interrupted buried subcutaneous sutures.  The skin edges were then re-apposed with running  sutures.  The skin graft was then placed in the primary defect and oriented appropriately.
Burow's Graft Text: The defect edges were debeveled with a #15 scalpel blade. Given the location of the defect, shape of the defect, the proximity to free margins and the presence of a standing cone deformity a Burow's skin graft was deemed most appropriate. The standing cone was removed and this tissue was then trimmed to the shape of the primary defect. The adipose tissue was also removed until only dermis and epidermis were left.  The skin margins of the secondary defect were undermined to an appropriate distance in all directions utilizing iris scissors.  The secondary defect was closed with interrupted buried subcutaneous sutures.  The skin edges were then re-apposed with running  sutures.  The skin graft was then placed in the primary defect and oriented appropriately.
Cartilage Graft Text: The defect edges were debeveled with a #15 scalpel blade. Given the location of the defect, shape of the defect, the fact the defect involved a full thickness cartilage defect a cartilage graft was deemed most appropriate.  An appropriate donor site was identified, cleansed, and anesthetized. The cartilage graft was then harvested and transferred to the recipient site, oriented appropriately and then sutured into place.  The secondary defect was then repaired using a primary closure.
Composite Graft Text: The defect edges were debeveled with a #15 scalpel blade. Given the location of the defect, shape of the defect, the proximity to free margins and the fact the defect was full thickness a composite graft was deemed most appropriate.  The defect was outline and then transferred to the donor site.  A full thickness graft was then excised from the donor site. The graft was then placed in the primary defect, oriented appropriately and then sutured into place.  The secondary defect was then repaired using a primary closure.
Epidermal Autograft Text: The defect edges were debeveled with a #15 scalpel blade. Given the location of the defect, shape of the defect and the proximity to free margins an epidermal autograft was deemed most appropriate. Using a sterile surgical marker, the primary defect shape was transferred to the donor site. The epidermal graft was then harvested.  The skin graft was then placed in the primary defect and oriented appropriately.
Dermal Autograft Text: The defect edges were debeveled with a #15 scalpel blade. Given the location of the defect, shape of the defect and the proximity to free margins a dermal autograft was deemed most appropriate. Using a sterile surgical marker, the primary defect shape was transferred to the donor site. The area thus outlined was incised deep to adipose tissue with a #15 scalpel blade.  The harvested graft was then trimmed of adipose and epidermal tissue until only dermis was left.  The skin graft was then placed in the primary defect and oriented appropriately.
Skin Substitute Text: The defect edges were debeveled with a #15 scalpel blade. Given the location of the defect, shape of the defect and the proximity to free margins a skin substitute graft was deemed most appropriate.  The graft material was trimmed to fit the size of the defect. The graft was then placed in the primary defect and oriented appropriately.
Tissue Cultured Epidermal Autograft Text: The defect edges were debeveled with a #15 scalpel blade. Given the location of the defect, shape of the defect and the proximity to free margins a tissue cultured epidermal autograft was deemed most appropriate.  The graft was then trimmed to fit the size of the defect.  The graft was then placed in the primary defect and oriented appropriately.
Xenograft Text: The defect edges were debeveled with a #15 scalpel blade. Given the location of the defect, shape of the defect and the proximity to free margins a xenograft was deemed most appropriate.  The graft was then trimmed to fit the size of the defect.  The graft was then placed in the primary defect and oriented appropriately.
Purse String (Intermediate) Text: Given the location of the defect and the characteristics of the surrounding skin a purse string intermediate closure was deemed most appropriate.  Undermining was performed circumferentially around the surgical defect.  A purse string suture was then placed and tightened.
Purse String (Simple) Text: Given the location of the defect and the characteristics of the surrounding skin a purse string simple closure was deemed most appropriate.  Undermining was performed circumferentially around the surgical defect.  A purse string suture was then placed and tightened.
Partial Purse String (Intermediate) Text: Given the location of the defect and the characteristics of the surrounding skin an intermediate purse string closure was deemed most appropriate.  Undermining was performed circumferentially around the surgical defect.  A purse string suture was then placed and tightened. Wound tension of the circular defect prevented complete closure of the wound.
Partial Purse String (Simple) Text: Given the location of the defect and the characteristics of the surrounding skin a simple purse string closure was deemed most appropriate.  Undermining was performed circumferentially around the surgical defect.  A purse string suture was then placed and tightened. Wound tension of the circular defect prevented complete closure of the wound.
Complex Repair And Single Advancement Flap Text: The defect edges were debeveled with a #15 scalpel blade.  The primary defect was closed partially with a complex linear closure.  Given the location of the remaining defect, shape of the defect and the proximity to free margins a single advancement flap was deemed most appropriate for complete closure of the defect.  Using a sterile surgical marker, an appropriate advancement flap was drawn incorporating the defect and placing the expected incisions within the relaxed skin tension lines where possible. The area thus outlined was incised deep to adipose tissue with a #15 scalpel blade. The skin margins were undermined to an appropriate distance in all directions utilizing iris scissors and carried over to close the primary defect.
Complex Repair And Double Advancement Flap Text: The defect edges were debeveled with a #15 scalpel blade.  The primary defect was closed partially with a complex linear closure.  Given the location of the remaining defect, shape of the defect and the proximity to free margins a double advancement flap was deemed most appropriate for complete closure of the defect.  Using a sterile surgical marker, an appropriate advancement flap was drawn incorporating the defect and placing the expected incisions within the relaxed skin tension lines where possible. The area thus outlined was incised deep to adipose tissue with a #15 scalpel blade. The skin margins were undermined to an appropriate distance in all directions utilizing iris scissors and carried over to close the primary defect.
Complex Repair And Modified Advancement Flap Text: The defect edges were debeveled with a #15 scalpel blade.  The primary defect was closed partially with a complex linear closure.  Given the location of the remaining defect, shape of the defect and the proximity to free margins a modified advancement flap was deemed most appropriate for complete closure of the defect.  Using a sterile surgical marker, an appropriate advancement flap was drawn incorporating the defect and placing the expected incisions within the relaxed skin tension lines where possible. The area thus outlined was incised deep to adipose tissue with a #15 scalpel blade. The skin margins were undermined to an appropriate distance in all directions utilizing iris scissors and carried over to close the primary defect.
Complex Repair And A-T Advancement Flap Text: The defect edges were debeveled with a #15 scalpel blade.  The primary defect was closed partially with a complex linear closure.  Given the location of the remaining defect, shape of the defect and the proximity to free margins an A-T advancement flap was deemed most appropriate for complete closure of the defect.  Using a sterile surgical marker, an appropriate advancement flap was drawn incorporating the defect and placing the expected incisions within the relaxed skin tension lines where possible. The area thus outlined was incised deep to adipose tissue with a #15 scalpel blade. The skin margins were undermined to an appropriate distance in all directions utilizing iris scissors and carried over to close the primary defect.
Complex Repair And O-T Advancement Flap Text: The defect edges were debeveled with a #15 scalpel blade.  The primary defect was closed partially with a complex linear closure.  Given the location of the remaining defect, shape of the defect and the proximity to free margins an O-T advancement flap was deemed most appropriate for complete closure of the defect.  Using a sterile surgical marker, an appropriate advancement flap was drawn incorporating the defect and placing the expected incisions within the relaxed skin tension lines where possible. The area thus outlined was incised deep to adipose tissue with a #15 scalpel blade. The skin margins were undermined to an appropriate distance in all directions utilizing iris scissors and carried over to close the primary defect.
Complex Repair And O-L Flap Text: The defect edges were debeveled with a #15 scalpel blade.  The primary defect was closed partially with a complex linear closure.  Given the location of the remaining defect, shape of the defect and the proximity to free margins an O-L flap was deemed most appropriate for complete closure of the defect.  Using a sterile surgical marker, an appropriate flap was drawn incorporating the defect and placing the expected incisions within the relaxed skin tension lines where possible. The area thus outlined was incised deep to adipose tissue with a #15 scalpel blade. The skin margins were undermined to an appropriate distance in all directions utilizing iris scissors and carried over to close the primary defect.
Complex Repair And Bilobe Flap Text: The defect edges were debeveled with a #15 scalpel blade.  The primary defect was closed partially with a complex linear closure.  Given the location of the remaining defect, shape of the defect and the proximity to free margins a bilobe flap was deemed most appropriate for complete closure of the defect.  Using a sterile surgical marker, an appropriate advancement flap was drawn incorporating the defect and placing the expected incisions within the relaxed skin tension lines where possible. The area thus outlined was incised deep to adipose tissue with a #15 scalpel blade. The skin margins were undermined to an appropriate distance in all directions utilizing iris scissors and carried over to close the primary defect.
Complex Repair And Melolabial Flap Text: The defect edges were debeveled with a #15 scalpel blade.  The primary defect was closed partially with a complex linear closure.  Given the location of the remaining defect, shape of the defect and the proximity to free margins a melolabial flap was deemed most appropriate for complete closure of the defect.  Using a sterile surgical marker, an appropriate advancement flap was drawn incorporating the defect and placing the expected incisions within the relaxed skin tension lines where possible. The area thus outlined was incised deep to adipose tissue with a #15 scalpel blade. The skin margins were undermined to an appropriate distance in all directions utilizing iris scissors and carried over to close the primary defect.
Complex Repair And Rotation Flap Text: The defect edges were debeveled with a #15 scalpel blade.  The primary defect was closed partially with a complex linear closure.  Given the location of the remaining defect, shape of the defect and the proximity to free margins a rotation flap was deemed most appropriate for complete closure of the defect.  Using a sterile surgical marker, an appropriate advancement flap was drawn incorporating the defect and placing the expected incisions within the relaxed skin tension lines where possible. The area thus outlined was incised deep to adipose tissue with a #15 scalpel blade. The skin margins were undermined to an appropriate distance in all directions utilizing iris scissors and carried over to close the primary defect.
Complex Repair And Rhombic Flap Text: The defect edges were debeveled with a #15 scalpel blade.  The primary defect was closed partially with a complex linear closure.  Given the location of the remaining defect, shape of the defect and the proximity to free margins a rhombic flap was deemed most appropriate for complete closure of the defect.  Using a sterile surgical marker, an appropriate advancement flap was drawn incorporating the defect and placing the expected incisions within the relaxed skin tension lines where possible. The area thus outlined was incised deep to adipose tissue with a #15 scalpel blade. The skin margins were undermined to an appropriate distance in all directions utilizing iris scissors and carried over to close the primary defect.
Complex Repair And Transposition Flap Text: The defect edges were debeveled with a #15 scalpel blade.  The primary defect was closed partially with a complex linear closure.  Given the location of the remaining defect, shape of the defect and the proximity to free margins a transposition flap was deemed most appropriate for complete closure of the defect.  Using a sterile surgical marker, an appropriate advancement flap was drawn incorporating the defect and placing the expected incisions within the relaxed skin tension lines where possible. The area thus outlined was incised deep to adipose tissue with a #15 scalpel blade. The skin margins were undermined to an appropriate distance in all directions utilizing iris scissors and carried over to close the primary defect.
Complex Repair And V-Y Plasty Text: The defect edges were debeveled with a #15 scalpel blade.  The primary defect was closed partially with a complex linear closure.  Given the location of the remaining defect, shape of the defect and the proximity to free margins a V-Y plasty was deemed most appropriate for complete closure of the defect.  Using a sterile surgical marker, an appropriate advancement flap was drawn incorporating the defect and placing the expected incisions within the relaxed skin tension lines where possible. The area thus outlined was incised deep to adipose tissue with a #15 scalpel blade. The skin margins were undermined to an appropriate distance in all directions utilizing iris scissors and carried over to close the primary defect.
Complex Repair And M Plasty Text: The defect edges were debeveled with a #15 scalpel blade.  The primary defect was closed partially with a complex linear closure.  Given the location of the remaining defect, shape of the defect and the proximity to free margins an M plasty was deemed most appropriate for complete closure of the defect.  Using a sterile surgical marker, an appropriate advancement flap was drawn incorporating the defect and placing the expected incisions within the relaxed skin tension lines where possible. The area thus outlined was incised deep to adipose tissue with a #15 scalpel blade. The skin margins were undermined to an appropriate distance in all directions utilizing iris scissors and carried over to close the primary defect.
Complex Repair And Double M Plasty Text: The defect edges were debeveled with a #15 scalpel blade.  The primary defect was closed partially with a complex linear closure.  Given the location of the remaining defect, shape of the defect and the proximity to free margins a double M plasty was deemed most appropriate for complete closure of the defect.  Using a sterile surgical marker, an appropriate advancement flap was drawn incorporating the defect and placing the expected incisions within the relaxed skin tension lines where possible. The area thus outlined was incised deep to adipose tissue with a #15 scalpel blade. The skin margins were undermined to an appropriate distance in all directions utilizing iris scissors and carried over to close the primary defect.
Complex Repair And W Plasty Text: The defect edges were debeveled with a #15 scalpel blade.  The primary defect was closed partially with a complex linear closure.  Given the location of the remaining defect, shape of the defect and the proximity to free margins a W plasty was deemed most appropriate for complete closure of the defect.  Using a sterile surgical marker, an appropriate advancement flap was drawn incorporating the defect and placing the expected incisions within the relaxed skin tension lines where possible. The area thus outlined was incised deep to adipose tissue with a #15 scalpel blade. The skin margins were undermined to an appropriate distance in all directions utilizing iris scissors and carried over to close the primary defect.
Complex Repair And Z Plasty Text: The defect edges were debeveled with a #15 scalpel blade.  The primary defect was closed partially with a complex linear closure.  Given the location of the remaining defect, shape of the defect and the proximity to free margins a Z plasty was deemed most appropriate for complete closure of the defect.  Using a sterile surgical marker, an appropriate advancement flap was drawn incorporating the defect and placing the expected incisions within the relaxed skin tension lines where possible. The area thus outlined was incised deep to adipose tissue with a #15 scalpel blade. The skin margins were undermined to an appropriate distance in all directions utilizing iris scissors and carried over to close the primary defect.
Complex Repair And Dorsal Nasal Flap Text: The defect edges were debeveled with a #15 scalpel blade.  The primary defect was closed partially with a complex linear closure.  Given the location of the remaining defect, shape of the defect and the proximity to free margins a dorsal nasal flap was deemed most appropriate for complete closure of the defect.  Using a sterile surgical marker, an appropriate flap was drawn incorporating the defect and placing the expected incisions within the relaxed skin tension lines where possible. The area thus outlined was incised deep to adipose tissue with a #15 scalpel blade. The skin margins were undermined to an appropriate distance in all directions utilizing iris scissors and carried over to close the primary defect.
Complex Repair And Ftsg Text: The defect edges were debeveled with a #15 scalpel blade.  The primary defect was closed partially with a complex linear closure.  Given the location of the defect, shape of the defect and the proximity to free margins a full thickness skin graft was deemed most appropriate to repair the remaining defect.  The graft was trimmed to fit the size of the remaining defect.  The graft was then placed in the primary defect, oriented appropriately, and sutured into place.
Complex Repair And Burow's Graft Text: The defect edges were debeveled with a #15 scalpel blade.  The primary defect was closed partially with a complex linear closure.  Given the location of the defect, shape of the defect, the proximity to free margins and the presence of a standing cone deformity a Burow's graft was deemed most appropriate to repair the remaining defect.  The graft was trimmed to fit the size of the remaining defect.  The graft was then placed in the primary defect, oriented appropriately, and sutured into place.
Complex Repair And Split-Thickness Skin Graft Text: The defect edges were debeveled with a #15 scalpel blade.  The primary defect was closed partially with a complex linear closure.  Given the location of the defect, shape of the defect and the proximity to free margins a split thickness skin graft was deemed most appropriate to repair the remaining defect.  The graft was trimmed to fit the size of the remaining defect.  The graft was then placed in the primary defect, oriented appropriately, and sutured into place.
Complex Repair And Epidermal Autograft Text: The defect edges were debeveled with a #15 scalpel blade.  The primary defect was closed partially with a complex linear closure.  Given the location of the defect, shape of the defect and the proximity to free margins an epidermal autograft was deemed most appropriate to repair the remaining defect.  The graft was trimmed to fit the size of the remaining defect.  The graft was then placed in the primary defect, oriented appropriately, and sutured into place.
Complex Repair And Dermal Autograft Text: The defect edges were debeveled with a #15 scalpel blade.  The primary defect was closed partially with a complex linear closure.  Given the location of the defect, shape of the defect and the proximity to free margins an dermal autograft was deemed most appropriate to repair the remaining defect.  The graft was trimmed to fit the size of the remaining defect.  The graft was then placed in the primary defect, oriented appropriately, and sutured into place.
Complex Repair And Tissue Cultured Epidermal Autograft Text: The defect edges were debeveled with a #15 scalpel blade.  The primary defect was closed partially with a complex linear closure.  Given the location of the defect, shape of the defect and the proximity to free margins an tissue cultured epidermal autograft was deemed most appropriate to repair the remaining defect.  The graft was trimmed to fit the size of the remaining defect.  The graft was then placed in the primary defect, oriented appropriately, and sutured into place.
Complex Repair And Xenograft Text: The defect edges were debeveled with a #15 scalpel blade.  The primary defect was closed partially with a complex linear closure.  Given the location of the defect, shape of the defect and the proximity to free margins a xenograft was deemed most appropriate to repair the remaining defect.  The graft was trimmed to fit the size of the remaining defect.  The graft was then placed in the primary defect, oriented appropriately, and sutured into place.
Complex Repair And Skin Substitute Graft Text: The defect edges were debeveled with a #15 scalpel blade.  The primary defect was closed partially with a complex linear closure.  Given the location of the remaining defect, shape of the defect and the proximity to free margins a skin substitute graft was deemed most appropriate to repair the remaining defect.  The graft was trimmed to fit the size of the remaining defect.  The graft was then placed in the primary defect, oriented appropriately, and sutured into place.
Path Notes (To The Dermatopathologist): Please check margins.
Consent was obtained from the patient. The risks and benefits to therapy were discussed in detail. Specifically, the risks of infection, scarring, bleeding, prolonged wound healing, incomplete removal, allergy to anesthesia, nerve injury and recurrence were addressed. Prior to the procedure, the treatment site was clearly identified and confirmed by the patient. All components of Universal Protocol/PAUSE Rule completed.
Post-Care Instructions: I reviewed with the patient in detail post-care instructions. Patient is not to engage in any heavy lifting, exercise, or swimming for the next 14 days. Should the patient develop any fevers, chills, bleeding, severe pain patient will contact the office immediately.
Home Suture Removal Text: Patient was provided a home suture removal kit and will remove their sutures at home.  If they have any questions or difficulties they will call the office.
Where Do You Want The Question To Include Opioid Counseling Located?: Case Summary Tab
Information: Selecting Yes will display possible errors in your note based on the variables you have selected. This validation is only offered as a suggestion for you. PLEASE NOTE THAT THE VALIDATION TEXT WILL BE REMOVED WHEN YOU FINALIZE YOUR NOTE. IF YOU WANT TO FAX A PRELIMINARY NOTE YOU WILL NEED TO TOGGLE THIS TO 'NO' IF YOU DO NOT WANT IT IN YOUR FAXED NOTE.

## 2023-09-18 DIAGNOSIS — E03.9 HYPOTHYROIDISM (ACQUIRED): ICD-10-CM

## 2023-09-19 RX ORDER — LEVOTHYROXINE SODIUM 0.07 MG/1
75 TABLET ORAL
Qty: 90 TABLET | Refills: 3 | Status: SHIPPED | OUTPATIENT
Start: 2023-09-19

## 2023-09-21 ENCOUNTER — APPOINTMENT (RX ONLY)
Dept: URBAN - METROPOLITAN AREA CLINIC 31 | Facility: CLINIC | Age: 73
Setting detail: DERMATOLOGY
End: 2023-09-21

## 2023-09-21 DIAGNOSIS — Z48.02 ENCOUNTER FOR REMOVAL OF SUTURES: ICD-10-CM

## 2023-09-21 PROCEDURE — ? SUTURE REMOVAL (GLOBAL PERIOD)

## 2023-09-21 PROCEDURE — ? COUNSELING

## 2023-09-21 ASSESSMENT — LOCATION ZONE DERM: LOCATION ZONE: TRUNK

## 2023-09-21 ASSESSMENT — LOCATION DETAILED DESCRIPTION DERM: LOCATION DETAILED: RIGHT SUPERIOR UPPER BACK

## 2023-09-21 ASSESSMENT — LOCATION SIMPLE DESCRIPTION DERM: LOCATION SIMPLE: RIGHT UPPER BACK

## 2023-09-21 NOTE — PROCEDURE: SUTURE REMOVAL (GLOBAL PERIOD)
Detail Level: Detailed
Add 22962 Cpt? (Important Note: In 2017 The Use Of 85190 Is Being Tracked By Cms To Determine Future Global Period Reimbursement For Global Periods): no

## 2023-10-10 ENCOUNTER — APPOINTMENT (RX ONLY)
Dept: URBAN - METROPOLITAN AREA CLINIC 31 | Facility: CLINIC | Age: 73
Setting detail: DERMATOLOGY
End: 2023-10-10

## 2023-10-10 PROBLEM — D04.72 CARCINOMA IN SITU OF SKIN OF LEFT LOWER LIMB, INCLUDING HIP: Status: ACTIVE | Noted: 2023-10-10

## 2023-10-10 PROCEDURE — 13121 CMPLX RPR S/A/L 2.6-7.5 CM: CPT

## 2023-10-10 PROCEDURE — ? MEDICATION COUNSELING

## 2023-10-10 PROCEDURE — ? EXCISION

## 2023-10-10 PROCEDURE — 11603 EXC TR-EXT MAL+MARG 2.1-3 CM: CPT

## 2023-10-10 PROCEDURE — ? PRESCRIPTION

## 2023-10-10 RX ORDER — CEPHALEXIN 500 MG/1
CAPSULE ORAL TID
Qty: 30 | Refills: 0 | Status: ERX | COMMUNITY
Start: 2023-10-10

## 2023-10-10 RX ADMIN — CEPHALEXIN: 500 CAPSULE ORAL at 00:00

## 2023-10-10 NOTE — PROCEDURE: EXCISION
Referring Physician (Optional): Reena
Size Of Lesion In Cm: 1.3
X Size Of Lesion In Cm (Optional): 0.7
Size Of Margin In Cm: 0.4
Anesthesia Volume In Cc: 0
Was An Eye Clamp Used?: No
Eye Clamp Note Details: An eye clamp was used during the procedure.
Excision Method: Fusiform
Saucerization Depth: dermis and superficial adipose tissue
Repair Type: Complex
Suturegard Retention Suture: 2-0 Nylon
Retention Suture Bite Size: 3 mm
Length To Time In Minutes Device Was In Place: 10
Number Of Hemigard Strips Per Side: 1
Intermediate / Complex Repair - Final Wound Length In Cm: 4
Complex Requirements: Extensive Undermining Performed?: Yes
Width Of Defect Perpendicular To Closure In Cm (Required): 1.5
Distance Of Undermining In Cm (Required): 2
Undermining Type: Entire Wound
Debridement Text: The wound edges were debrided prior to proceeding with the closure to facilitate wound healing.
Helical Rim Text: The closure involved the helical rim.
Vermilion Border Text: The closure involved the vermilion border.
Nostril Rim Text: The closure involved the nostril rim.
Retention Suture Text: Retention sutures were placed to support the closure and prevent dehiscence.
Suture Removal: 28 days
Lab: [id number]
Lab Facility: 
Graft Donor Site Bandage (Optional-Leave Blank If You Don't Want In Note): Steri-strips and a pressure bandage were applied to the donor site.
Epidermal Closure Graft Donor Site (Optional): simple interrupted
Billing Type: Third-Party Bill
Excision Depth: adipose tissue
Scalpel Size: 15 blade
Anesthesia Type: 1% lidocaine with epinephrine
Additional Anesthesia Volume In Cc: 6
Hemostasis: Electrocautery
Estimated Blood Loss (Cc): minimal
Detail Level: Detailed
Deep Sutures: 3-0 Polysorb
Epidermal Sutures: 3-0 Surgipro
Wound Care: Petrolatum
Dressing: dry sterile dressing
Suturegard Intro: Intraoperative tissue expansion was performed, utilizing the SUTUREGARD device, in order to reduce wound tension.
Suturegard Body: The suture ends were repeatedly re-tightened and re-clamped to achieve the desired tissue expansion.
Hemigard Intro: Due to skin fragility and wound tension, it was decided to use HEMIGARD adhesive retention suture devices to permit a linear closure. The skin was cleaned and dried for a 6cm distance away from the wound. Excessive hair, if present, was removed to allow for adhesion.
Hemigard Postcare Instructions: The HEMIGARD strips are to remain completely dry for at least 5-7 days.
Positioning (Leave Blank If You Do Not Want): The patient was placed in a comfortable position exposing the surgical site.
Pre-Excision Curettage Text (Leave Blank If You Do Not Want): Prior to drawing the surgical margin the visible lesion was removed with curettage to clearly define the lesion size.
Complex Repair Preamble Text (Leave Blank If You Do Not Want): Extensive wide undermining was performed.
Intermediate Repair Preamble Text (Leave Blank If You Do Not Want): Undermining was performed with blunt dissection.
Curvilinear Excision Additional Text (Leave Blank If You Do Not Want): The margin was drawn around the clinically apparent lesion.  A curvilinear shape was then drawn on the skin incorporating the lesion and margins.  Incisions were then made along these lines to the appropriate tissue plane and the lesion was extirpated.
Fusiform Excision Additional Text (Leave Blank If You Do Not Want): The margin was drawn around the clinically apparent lesion.  A fusiform shape was then drawn on the skin incorporating the lesion and margins.  Incisions were then made along these lines to the appropriate tissue plane and the lesion was extirpated.
Elliptical Excision Additional Text (Leave Blank If You Do Not Want): The margin was drawn around the clinically apparent lesion.  An elliptical shape was then drawn on the skin incorporating the lesion and margins.  Incisions were then made along these lines to the appropriate tissue plane and the lesion was extirpated.
Saucerization Excision Additional Text (Leave Blank If You Do Not Want): The margin was drawn around the clinically apparent lesion.  Incisions were then made along these lines, in a tangential fashion, to the appropriate tissue plane and the lesion was extirpated.
Slit Excision Additional Text (Leave Blank If You Do Not Want): A linear line was drawn on the skin overlying the lesion. An incision was made slowly until the lesion was visualized.  Once visualized, the lesion was removed with blunt dissection.
Excisional Biopsy Additional Text (Leave Blank If You Do Not Want): The margin was drawn around the clinically apparent lesion. An elliptical shape was then drawn on the skin incorporating the lesion and margins.  Incisions were then made along these lines to the appropriate tissue plane and the lesion was extirpated.
Perilesional Excision Additional Text (Leave Blank If You Do Not Want): The margin was drawn around the clinically apparent lesion. Incisions were then made along these lines to the appropriate tissue plane and the lesion was extirpated.
Repair Performed By Another Provider Text (Leave Blank If You Do Not Want): After the tissue was excised the defect was repaired by another provider.
No Repair - Repaired With Adjacent Surgical Defect Text (Leave Blank If You Do Not Want): After the excision the defect was repaired concurrently with another surgical defect which was in close approximation.
Adjacent Tissue Transfer Text: The defect edges were debeveled with a #15 scalpel blade. Given the location of the defect and the proximity to free margins an adjacent tissue transfer was deemed most appropriate. Using a sterile surgical marker, an appropriate flap was drawn incorporating the defect and placing the expected incisions within the relaxed skin tension lines where possible. The area thus outlined was incised deep to adipose tissue with a #15 scalpel blade. The skin margins were undermined to an appropriate distance in all directions utilizing iris scissors and carried over to close the primary defect.
Advancement Flap (Single) Text: The defect edges were debeveled with a #15 scalpel blade. Given the location of the defect and the proximity to free margins a single advancement flap was deemed most appropriate. Using a sterile surgical marker, an appropriate advancement flap was drawn incorporating the defect and placing the expected incisions within the relaxed skin tension lines where possible. The area thus outlined was incised deep to adipose tissue with a #15 scalpel blade. The skin margins were undermined to an appropriate distance in all directions utilizing iris scissors. Following this, the designed flap was advanced and carried over into the primary defect and sutured into place.
Advancement Flap (Double) Text: The defect edges were debeveled with a #15 scalpel blade. Given the location of the defect and the proximity to free margins a double advancement flap was deemed most appropriate. Using a sterile surgical marker, the appropriate advancement flaps were drawn incorporating the defect and placing the expected incisions within the relaxed skin tension lines where possible. The area thus outlined was incised deep to adipose tissue with a #15 scalpel blade. The skin margins were undermined to an appropriate distance in all directions utilizing iris scissors. Following this, the designed flaps were advanced and carried over into the primary defect and sutured into place.
Burow's Advancement Flap Text: The defect edges were debeveled with a #15 scalpel blade. Given the location of the defect and the proximity to free margins a Burow's advancement flap was deemed most appropriate. Using a sterile surgical marker, the appropriate advancement flap was drawn incorporating the defect and placing the expected incisions within the relaxed skin tension lines where possible. The area thus outlined was incised deep to adipose tissue with a #15 scalpel blade. The skin margins were undermined to an appropriate distance in all directions utilizing iris scissors. Following this, the designed flap was advanced and carried over into the primary defect and sutured into place.
Chonodrocutaneous Helical Advancement Flap Text: The defect edges were debeveled with a #15 scalpel blade. Given the location of the defect and the proximity to free margins a chondrocutaneous helical advancement flap was deemed most appropriate. Using a sterile surgical marker, the appropriate advancement flap was drawn incorporating the defect and placing the expected incisions within the relaxed skin tension lines where possible. The area thus outlined was incised deep to adipose tissue with a #15 scalpel blade. The skin margins were undermined to an appropriate distance in all directions utilizing iris scissors. Following this, the designed flap was advanced and carried over into the primary defect and sutured into place.
Crescentic Advancement Flap Text: The defect edges were debeveled with a #15 scalpel blade. Given the location of the defect and the proximity to free margins a crescentic advancement flap was deemed most appropriate. Using a sterile surgical marker, the appropriate advancement flap was drawn incorporating the defect and placing the expected incisions within the relaxed skin tension lines where possible. The area thus outlined was incised deep to adipose tissue with a #15 scalpel blade. The skin margins were undermined to an appropriate distance in all directions utilizing iris scissors. Following this, the designed flap was advanced and carried over into the primary defect and sutured into place.
A-T Advancement Flap Text: The defect edges were debeveled with a #15 scalpel blade. Given the location of the defect, shape of the defect and the proximity to free margins an A-T advancement flap was deemed most appropriate. Using a sterile surgical marker, an appropriate advancement flap was drawn incorporating the defect and placing the expected incisions within the relaxed skin tension lines where possible. The area thus outlined was incised deep to adipose tissue with a #15 scalpel blade. The skin margins were undermined to an appropriate distance in all directions utilizing iris scissors. Following this, the designed flap was advanced and carried over into the primary defect and sutured into place.
O-T Advancement Flap Text: The defect edges were debeveled with a #15 scalpel blade. Given the location of the defect, shape of the defect and the proximity to free margins an O-T advancement flap was deemed most appropriate. Using a sterile surgical marker, an appropriate advancement flap was drawn incorporating the defect and placing the expected incisions within the relaxed skin tension lines where possible. The area thus outlined was incised deep to adipose tissue with a #15 scalpel blade. The skin margins were undermined to an appropriate distance in all directions utilizing iris scissors. Following this, the designed flap was advanced and carried over into the primary defect and sutured into place.
O-L Flap Text: The defect edges were debeveled with a #15 scalpel blade. Given the location of the defect, shape of the defect and the proximity to free margins an O-L flap was deemed most appropriate. Using a sterile surgical marker, an appropriate advancement flap was drawn incorporating the defect and placing the expected incisions within the relaxed skin tension lines where possible. The area thus outlined was incised deep to adipose tissue with a #15 scalpel blade. The skin margins were undermined to an appropriate distance in all directions utilizing iris scissors. Following this, the designed flap was advanced and carried over into the primary defect and sutured into place.
O-Z Flap Text: The defect edges were debeveled with a #15 scalpel blade. Given the location of the defect, shape of the defect and the proximity to free margins an O-Z flap was deemed most appropriate. Using a sterile surgical marker, an appropriate transposition flap was drawn incorporating the defect and placing the expected incisions within the relaxed skin tension lines where possible. The area thus outlined was incised deep to adipose tissue with a #15 scalpel blade. The skin margins were undermined to an appropriate distance in all directions utilizing iris scissors. Following this, the designed flap was carried over into the primary defect and sutured into place.
Double O-Z Flap Text: The defect edges were debeveled with a #15 scalpel blade. Given the location of the defect, shape of the defect and the proximity to free margins a Double O-Z flap was deemed most appropriate. Using a sterile surgical marker, an appropriate transposition flap was drawn incorporating the defect and placing the expected incisions within the relaxed skin tension lines where possible. The area thus outlined was incised deep to adipose tissue with a #15 scalpel blade. The skin margins were undermined to an appropriate distance in all directions utilizing iris scissors. Following this, the designed flap was carried over into the primary defect and sutured into place.
V-Y Flap Text: The defect edges were debeveled with a #15 scalpel blade. Given the location of the defect, shape of the defect and the proximity to free margins a V-Y flap was deemed most appropriate. Using a sterile surgical marker, an appropriate advancement flap was drawn incorporating the defect and placing the expected incisions within the relaxed skin tension lines where possible. The area thus outlined was incised deep to adipose tissue with a #15 scalpel blade. The skin margins were undermined to an appropriate distance in all directions utilizing iris scissors. Following this, the designed flap was advanced and carried over into the primary defect and sutured into place.
Advancement-Rotation Flap Text: The defect edges were debeveled with a #15 scalpel blade. Given the location of the defect, shape of the defect and the proximity to free margins an advancement-rotation flap was deemed most appropriate. Using a sterile surgical marker, an appropriate flap was drawn incorporating the defect and placing the expected incisions within the relaxed skin tension lines where possible. The area thus outlined was incised deep to adipose tissue with a #15 scalpel blade. The skin margins were undermined to an appropriate distance in all directions utilizing iris scissors. Following this, the designed flap was carried over into the primary defect and sutured into place.
Mercedes Flap Text: The defect edges were debeveled with a #15 scalpel blade. Given the location of the defect, shape of the defect and the proximity to free margins a Mercedes flap was deemed most appropriate. Using a sterile surgical marker, an appropriate advancement flap was drawn incorporating the defect and placing the expected incisions within the relaxed skin tension lines where possible. The area thus outlined was incised deep to adipose tissue with a #15 scalpel blade. The skin margins were undermined to an appropriate distance in all directions utilizing iris scissors. Following this, the designed flap was advanced and carried over into the primary defect and sutured into place.
Modified Advancement Flap Text: The defect edges were debeveled with a #15 scalpel blade. Given the location of the defect, shape of the defect and the proximity to free margins a modified advancement flap was deemed most appropriate. Using a sterile surgical marker, an appropriate advancement flap was drawn incorporating the defect and placing the expected incisions within the relaxed skin tension lines where possible. The area thus outlined was incised deep to adipose tissue with a #15 scalpel blade. The skin margins were undermined to an appropriate distance in all directions utilizing iris scissors. Following this, the designed flap was advanced and carried over into the primary defect and sutured into place.
Mucosal Advancement Flap Text: Given the location of the defect, shape of the defect and the proximity to free margins a mucosal advancement flap was deemed most appropriate. Incisions were made with a 15 blade scalpel in the appropriate fashion along the cutaneous vermilion border and the mucosal lip. The remaining actinically damaged mucosal tissue was excised.  The mucosal advancement flap was then elevated to the gingival sulcus with care taken to preserve the neurovascular structures and advanced into the primary defect. Care was taken to ensure that precise realignment of the vermilion border was achieved.
Peng Advancement Flap Text: The defect edges were debeveled with a #15 scalpel blade. Given the location of the defect, shape of the defect and the proximity to free margins a Peng advancement flap was deemed most appropriate. Using a sterile surgical marker, an appropriate advancement flap was drawn incorporating the defect and placing the expected incisions within the relaxed skin tension lines where possible. The area thus outlined was incised deep to adipose tissue with a #15 scalpel blade. The skin margins were undermined to an appropriate distance in all directions utilizing iris scissors. Following this, the designed flap was advanced and carried over into the primary defect and sutured into place.
Hatchet Flap Text: The defect edges were debeveled with a #15 scalpel blade. Given the location of the defect, shape of the defect and the proximity to free margins a hatchet flap was deemed most appropriate. Using a sterile surgical marker, an appropriate hatchet flap was drawn incorporating the defect and placing the expected incisions within the relaxed skin tension lines where possible. The area thus outlined was incised deep to adipose tissue with a #15 scalpel blade. The skin margins were undermined to an appropriate distance in all directions utilizing iris scissors. Following this, the designed flap was carried over into the primary defect and sutured into place.
Rotation Flap Text: The defect edges were debeveled with a #15 scalpel blade. Given the location of the defect, shape of the defect and the proximity to free margins a rotation flap was deemed most appropriate. Using a sterile surgical marker, an appropriate rotation flap was drawn incorporating the defect and placing the expected incisions within the relaxed skin tension lines where possible. The area thus outlined was incised deep to adipose tissue with a #15 scalpel blade. The skin margins were undermined to an appropriate distance in all directions utilizing iris scissors. Following this, the designed flap was carried over into the primary defect and sutured into place.
Bilateral Rotation Flap Text: The defect edges were debeveled with a #15 scalpel blade. Given the location of the defect, shape of the defect and the proximity to free margins a bilateral rotation flap was deemed most appropriate. Using a sterile surgical marker, an appropriate rotation flap was drawn incorporating the defect and placing the expected incisions within the relaxed skin tension lines where possible. The area thus outlined was incised deep to adipose tissue with a #15 scalpel blade. The skin margins were undermined to an appropriate distance in all directions utilizing iris scissors. Following this, the designed flap was carried over into the primary defect and sutured into place.
Spiral Flap Text: The defect edges were debeveled with a #15 scalpel blade. Given the location of the defect, shape of the defect and the proximity to free margins a spiral flap was deemed most appropriate. Using a sterile surgical marker, an appropriate rotation flap was drawn incorporating the defect and placing the expected incisions within the relaxed skin tension lines where possible. The area thus outlined was incised deep to adipose tissue with a #15 scalpel blade. The skin margins were undermined to an appropriate distance in all directions utilizing iris scissors. Following this, the designed flap was carried over into the primary defect and sutured into place.
Staged Advancement Flap Text: The defect edges were debeveled with a #15 scalpel blade. Given the location of the defect, shape of the defect and the proximity to free margins a staged advancement flap was deemed most appropriate. Using a sterile surgical marker, an appropriate advancement flap was drawn incorporating the defect and placing the expected incisions within the relaxed skin tension lines where possible. The area thus outlined was incised deep to adipose tissue with a #15 scalpel blade. The skin margins were undermined to an appropriate distance in all directions utilizing iris scissors. Following this, the designed flap was carried over into the primary defect and sutured into place.
Star Wedge Flap Text: The defect edges were debeveled with a #15 scalpel blade. Given the location of the defect, shape of the defect and the proximity to free margins a star wedge flap was deemed most appropriate. Using a sterile surgical marker, an appropriate rotation flap was drawn incorporating the defect and placing the expected incisions within the relaxed skin tension lines where possible. The area thus outlined was incised deep to adipose tissue with a #15 scalpel blade. The skin margins were undermined to an appropriate distance in all directions utilizing iris scissors. Following this, the designed flap was carried over into the primary defect and sutured into place.
Transposition Flap Text: The defect edges were debeveled with a #15 scalpel blade. Given the location of the defect and the proximity to free margins a transposition flap was deemed most appropriate. Using a sterile surgical marker, an appropriate transposition flap was drawn incorporating the defect. The area thus outlined was incised deep to adipose tissue with a #15 scalpel blade. The skin margins were undermined to an appropriate distance in all directions utilizing iris scissors. Following this, the designed flap was carried over into the primary defect and sutured into place.
Muscle Hinge Flap Text: The defect edges were debeveled with a #15 scalpel blade.  Given the size, depth and location of the defect and the proximity to free margins a muscle hinge flap was deemed most appropriate. Using a sterile surgical marker, an appropriate hinge flap was drawn incorporating the defect. The area thus outlined was incised with a #15 scalpel blade. The skin margins were undermined to an appropriate distance in all directions utilizing iris scissors. Following this, the designed flap was carried into the primary defect and sutured into place.
Mustarde Flap Text: The defect edges were debeveled with a #15 scalpel blade.  Given the size, depth and location of the defect and the proximity to free margins a Mustarde flap was deemed most appropriate. Using a sterile surgical marker, an appropriate flap was drawn incorporating the defect. The area thus outlined was incised with a #15 scalpel blade. The skin margins were undermined to an appropriate distance in all directions utilizing iris scissors. Following this, the designed flap was carried into the primary defect and sutured into place.
Nasal Turnover Hinge Flap Text: The defect edges were debeveled with a #15 scalpel blade.  Given the size, depth, location of the defect and the defect being full thickness a nasal turnover hinge flap was deemed most appropriate. Using a sterile surgical marker, an appropriate hinge flap was drawn incorporating the defect. The area thus outlined was incised with a #15 scalpel blade. The flap was designed to recreate the nasal mucosal lining and the alar rim. The skin margins were undermined to an appropriate distance in all directions utilizing iris scissors. Following this, the designed flap was carried over into the primary defect and sutured into place
Nasalis-Muscle-Based Myocutaneous Island Pedicle Flap Text: Using a #15 blade, an incision was made around the donor flap to the level of the nasalis muscle. Wide lateral undermining was then performed in both the subcutaneous plane above the nasalis muscle, and in a submuscular plane just above periosteum. This allowed the formation of a free nasalis muscle axial pedicle (based on the angular artery) which was still attached to the actual cutaneous flap, increasing its mobility and vascular viability. Hemostasis was obtained with pinpoint electrocoagulation. The flap was mobilized into position and the pivotal anchor points positioned and stabilized with buried interrupted sutures. Subcutaneous and dermal tissues were closed in a multilayered fashion with sutures. Tissue redundancies were excised, and the epidermal edges were apposed without significant tension and sutured with sutures.
Orbicularis Oris Muscle Flap Text: The defect edges were debeveled with a #15 scalpel blade.  Given that the defect affected the competency of the oral sphincter an orbicularis oris muscle flap was deemed most appropriate to restore this competency and normal muscle function.  Using a sterile surgical marker, an appropriate flap was drawn incorporating the defect. The area thus outlined was incised with a #15 scalpel blade. Following this, the designed flap was carried over into the primary defect and sutured into place.
Melolabial Transposition Flap Text: The defect edges were debeveled with a #15 scalpel blade. Given the location of the defect and the proximity to free margins a melolabial flap was deemed most appropriate. Using a sterile surgical marker, an appropriate melolabial transposition flap was drawn incorporating the defect. The area thus outlined was incised deep to adipose tissue with a #15 scalpel blade. The skin margins were undermined to an appropriate distance in all directions utilizing iris scissors. Following this, the designed flap was carried over into the primary defect and sutured into place.
Rhombic Flap Text: The defect edges were debeveled with a #15 scalpel blade. Given the location of the defect and the proximity to free margins a rhombic flap was deemed most appropriate. Using a sterile surgical marker, an appropriate rhombic flap was drawn incorporating the defect. The area thus outlined was incised deep to adipose tissue with a #15 scalpel blade. The skin margins were undermined to an appropriate distance in all directions utilizing iris scissors. Following this, the designed flap was carried over into the primary defect and sutured into place.
Rhomboid Transposition Flap Text: The defect edges were debeveled with a #15 scalpel blade. Given the location of the defect and the proximity to free margins a rhomboid transposition flap was deemed most appropriate. Using a sterile surgical marker, an appropriate rhomboid flap was drawn incorporating the defect. The area thus outlined was incised deep to adipose tissue with a #15 scalpel blade. The skin margins were undermined to an appropriate distance in all directions utilizing iris scissors. Following this, the designed flap was carried over into the primary defect and sutured into place.
Bi-Rhombic Flap Text: The defect edges were debeveled with a #15 scalpel blade. Given the location of the defect and the proximity to free margins a bi-rhombic flap was deemed most appropriate. Using a sterile surgical marker, an appropriate rhombic flap was drawn incorporating the defect. The area thus outlined was incised deep to adipose tissue with a #15 scalpel blade. The skin margins were undermined to an appropriate distance in all directions utilizing iris scissors. Following this, the designed flap was carried over into the primary defect and sutured into place.
Helical Rim Advancement Flap Text: The defect edges were debeveled with a #15 blade scalpel.  Given the location of the defect and the proximity to free margins (helical rim) a double helical rim advancement flap was deemed most appropriate. Using a sterile surgical marker, the appropriate advancement flaps were drawn incorporating the defect and placing the expected incisions between the helical rim and antihelix where possible.  The area thus outlined was incised through and through with a #15 scalpel blade.  With a skin hook and iris scissors, the flaps were gently and sharply undermined and freed up. Folllowing this, the designed flaps were carried over into the primary defect and sutured into place.
Bilateral Helical Rim Advancement Flap Text: The defect edges were debeveled with a #15 blade scalpel.  Given the location of the defect and the proximity to free margins (helical rim) a bilateral helical rim advancement flap was deemed most appropriate. Using a sterile surgical marker, the appropriate advancement flaps were drawn incorporating the defect and placing the expected incisions between the helical rim and antihelix where possible.  The area thus outlined was incised through and through with a #15 scalpel blade.  With a skin hook and iris scissors, the flaps were gently and sharply undermined and freed up. Following this, the designed flaps were placed into the primary defect and sutured into place.
Ear Star Wedge Flap Text: The defect edges were debeveled with a #15 blade scalpel.  Given the location of the defect and the proximity to free margins (helical rim) an ear star wedge flap was deemed most appropriate. Using a sterile surgical marker, the appropriate flap was drawn incorporating the defect and placing the expected incisions between the helical rim and antihelix where possible.  The area thus outlined was incised through and through with a #15 scalpel blade. Following this, the designed flap was carried over into the primary defect and sutured into place.
Banner Transposition Flap Text: The defect edges were debeveled with a #15 scalpel blade. Given the location of the defect and the proximity to free margins a Banner transposition flap was deemed most appropriate. Using a sterile surgical marker, an appropriate flap was drawn around the defect. The area thus outlined was incised deep to adipose tissue with a #15 scalpel blade. The skin margins were undermined to an appropriate distance in all directions utilizing iris scissors. Following this, the designed flap was carried into the primary defect and sutured into place.
Bilobed Flap Text: The defect edges were debeveled with a #15 scalpel blade. Given the location of the defect and the proximity to free margins a bilobe flap was deemed most appropriate. Using a sterile surgical marker, an appropriate bilobe flap drawn around the defect. The area thus outlined was incised deep to adipose tissue with a #15 scalpel blade. The skin margins were undermined to an appropriate distance in all directions utilizing iris scissors. Following this, the designed flap was carried over into the primary defect and sutured into place.
Bilobed Transposition Flap Text: The defect edges were debeveled with a #15 scalpel blade. Given the location of the defect and the proximity to free margins a bilobed transposition flap was deemed most appropriate. Using a sterile surgical marker, an appropriate bilobe flap drawn around the defect. The area thus outlined was incised deep to adipose tissue with a #15 scalpel blade. The skin margins were undermined to an appropriate distance in all directions utilizing iris scissors. Following this, the designed flap was carried over into the primary defect and sutured into place.
Trilobed Flap Text: The defect edges were debeveled with a #15 scalpel blade. Given the location of the defect and the proximity to free margins a trilobed flap was deemed most appropriate. Using a sterile surgical marker, an appropriate trilobed flap was drawn around the defect. The area thus outlined was incised deep to adipose tissue with a #15 scalpel blade. The skin margins were undermined to an appropriate distance in all directions utilizing iris scissors. Following this, the designed flap was carried into the primary defect and sutured into place.
Dorsal Nasal Flap Text: The defect edges were debeveled with a #15 scalpel blade. Given the location of the defect and the proximity to free margins a dorsal nasal flap was deemed most appropriate. Using a sterile surgical marker, an appropriate dorsal nasal flap was drawn around the defect. The area thus outlined was incised deep to adipose tissue with a #15 scalpel blade. The skin margins were undermined to an appropriate distance in all directions utilizing iris scissors. Following this, the designed flap was carried into the primary defect and sutured into place.
Island Pedicle Flap Text: The defect edges were debeveled with a #15 scalpel blade. Given the location of the defect, shape of the defect and the proximity to free margins an island pedicle advancement flap was deemed most appropriate. Using a sterile surgical marker, an appropriate advancement flap was drawn incorporating the defect, outlining the appropriate donor tissue and placing the expected incisions within the relaxed skin tension lines where possible. The area thus outlined was incised deep to adipose tissue with a #15 scalpel blade. The skin margins were undermined to an appropriate distance in all directions around the primary defect and laterally outward around the island pedicle utilizing iris scissors.  There was minimal undermining beneath the pedicle flap. Following this, the flap was carried over into the primary defect and sutured into place.
Island Pedicle Flap With Canthal Suspension Text: The defect edges were debeveled with a #15 scalpel blade. Given the location of the defect, shape of the defect and the proximity to free margins an island pedicle advancement flap was deemed most appropriate. Using a sterile surgical marker, an appropriate advancement flap was drawn incorporating the defect, outlining the appropriate donor tissue and placing the expected incisions within the relaxed skin tension lines where possible. The area thus outlined was incised deep to adipose tissue with a #15 scalpel blade. The skin margins were undermined to an appropriate distance in all directions around the primary defect and laterally outward around the island pedicle utilizing iris scissors.  There was minimal undermining beneath the pedicle flap. A suspension suture was placed in the canthal tendon to prevent tension and prevent ectropion. Following this, the designed flap was placed into the primary defect and sutured into place.
Alar Island Pedicle Flap Text: The defect edges were debeveled with a #15 scalpel blade. Given the location of the defect, shape of the defect and the proximity to the alar rim an island pedicle advancement flap was deemed most appropriate. Using a sterile surgical marker, an appropriate advancement flap was drawn incorporating the defect, outlining the appropriate donor tissue and placing the expected incisions within the nasal ala running parallel to the alar rim. The area thus outlined was incised with a #15 scalpel blade. The skin margins were undermined minimally to an appropriate distance in all directions around the primary defect and laterally outward around the island pedicle utilizing iris scissors.  There was minimal undermining beneath the pedicle flap. Following this, the designed flap was carried over into the primary defect and sutured into place.
Double Island Pedicle Flap Text: The defect edges were debeveled with a #15 scalpel blade. Given the location of the defect, shape of the defect and the proximity to free margins a double island pedicle advancement flap was deemed most appropriate. Using a sterile surgical marker, an appropriate advancement flap was drawn incorporating the defect, outlining the appropriate donor tissue and placing the expected incisions within the relaxed skin tension lines where possible. The area thus outlined was incised deep to adipose tissue with a #15 scalpel blade. The skin margins were undermined to an appropriate distance in all directions around the primary defect and laterally outward around the island pedicle utilizing iris scissors.  There was minimal undermining beneath the pedicle flap. Following this, the flap was carried over into the primary defect and sutured into place.
Island Pedicle Flap-Requiring Vessel Identification Text: The defect edges were debeveled with a #15 scalpel blade. Given the location of the defect, shape of the defect and the proximity to free margins an island pedicle advancement flap was deemed most appropriate. Using a sterile surgical marker, an appropriate advancement flap was drawn, based on the axial vessel mentioned above, incorporating the defect, outlining the appropriate donor tissue and placing the expected incisions within the relaxed skin tension lines where possible. The area thus outlined was incised deep to adipose tissue with a #15 scalpel blade. The skin margins were undermined to an appropriate distance in all directions around the primary defect and laterally outward around the island pedicle utilizing iris scissors.  There was minimal undermining beneath the pedicle flap. Following this, the designed flap was carried over into the primary defect and sutured into place.
Keystone Flap Text: The defect edges were debeveled with a #15 scalpel blade. Given the location of the defect, shape of the defect a keystone flap was deemed most appropriate. Using a sterile surgical marker, an appropriate keystone flap was drawn incorporating the defect, outlining the appropriate donor tissue and placing the expected incisions within the relaxed skin tension lines where possible. The area thus outlined was incised deep to adipose tissue with a #15 scalpel blade. The skin margins were undermined to an appropriate distance in all directions around the primary defect and laterally outward around the flap utilizing iris scissors. Following this, the designed flap was carried into the primary defect and sutured into place.
O-T Plasty Text: The defect edges were debeveled with a #15 scalpel blade. Given the location of the defect, shape of the defect and the proximity to free margins an O-T plasty was deemed most appropriate. Using a sterile surgical marker, an appropriate O-T plasty was drawn incorporating the defect and placing the expected incisions within the relaxed skin tension lines where possible. The area thus outlined was incised deep to adipose tissue with a #15 scalpel blade. The skin margins were undermined to an appropriate distance in all directions utilizing iris scissors. Following this, the designed flap was carried over into the primary defect and sutured into place.
O-Z Plasty Text: The defect edges were debeveled with a #15 scalpel blade. Given the location of the defect, shape of the defect and the proximity to free margins an O-Z plasty (double transposition flap) was deemed most appropriate. Using a sterile surgical marker, the appropriate transposition flaps were drawn incorporating the defect and placing the expected incisions within the relaxed skin tension lines where possible. The area thus outlined was incised deep to adipose tissue with a #15 scalpel blade. The skin margins were undermined to an appropriate distance in all directions utilizing iris scissors. Hemostasis was achieved with electrocautery. The flaps were then transposed and carried over into place, one clockwise and the other counterclockwise, and anchored with interrupted buried subcutaneous sutures.
Double O-Z Plasty Text: The defect edges were debeveled with a #15 scalpel blade. Given the location of the defect, shape of the defect and the proximity to free margins a Double O-Z plasty (double transposition flap) was deemed most appropriate. Using a sterile surgical marker, the appropriate transposition flaps were drawn incorporating the defect and placing the expected incisions within the relaxed skin tension lines where possible. The area thus outlined was incised deep to adipose tissue with a #15 scalpel blade. The skin margins were undermined to an appropriate distance in all directions utilizing iris scissors. Hemostasis was achieved with electrocautery. The flaps were then transposed and carried over into place, one clockwise and the other counterclockwise, and anchored with interrupted buried subcutaneous sutures.
V-Y Plasty Text: The defect edges were debeveled with a #15 scalpel blade. Given the location of the defect, shape of the defect and the proximity to free margins an V-Y advancement flap was deemed most appropriate. Using a sterile surgical marker, an appropriate advancement flap was drawn incorporating the defect and placing the expected incisions within the relaxed skin tension lines where possible. The area thus outlined was incised deep to adipose tissue with a #15 scalpel blade. The skin margins were undermined to an appropriate distance in all directions utilizing iris scissors. Following this, the designed flap was advanced and carried over into the primary defect and sutured into place.
H Plasty Text: Given the location of the defect, shape of the defect and the proximity to free margins a H-plasty was deemed most appropriate for repair. Using a sterile surgical marker, the appropriate advancement arms of the H-plasty were drawn incorporating the defect and placing the expected incisions within the relaxed skin tension lines where possible. The area thus outlined was incised deep to adipose tissue with a #15 scalpel blade. The skin margins were undermined to an appropriate distance in all directions utilizing iris scissors.  The opposing advancement arms were then advanced and carried over into place in opposite direction and anchored with interrupted buried subcutaneous sutures.
W Plasty Text: The lesion was extirpated to the level of the fat with a #15 scalpel blade. Given the location of the defect, shape of the defect and the proximity to free margins a W-plasty was deemed most appropriate for repair. Using a sterile surgical marker, the appropriate transposition arms of the W-plasty were drawn incorporating the defect and placing the expected incisions within the relaxed skin tension lines where possible. The area thus outlined was incised deep to adipose tissue with a #15 scalpel blade. The skin margins were undermined to an appropriate distance in all directions utilizing iris scissors. The opposing transposition arms were then transposed and carried over into place in opposite direction and anchored with interrupted buried subcutaneous sutures.
Z Plasty Text: The lesion was extirpated to the level of the fat with a #15 scalpel blade. Given the location of the defect, shape of the defect and the proximity to free margins a Z-plasty was deemed most appropriate for repair. Using a sterile surgical marker, the appropriate transposition arms of the Z-plasty were drawn incorporating the defect and placing the expected incisions within the relaxed skin tension lines where possible. The area thus outlined was incised deep to adipose tissue with a #15 scalpel blade. The skin margins were undermined to an appropriate distance in all directions utilizing iris scissors. The opposing transposition arms were then transposed and carried over into place in opposite direction and anchored with interrupted buried subcutaneous sutures.
Double Z Plasty Text: The lesion was extirpated to the level of the fat with a #15 scalpel blade. Given the location of the defect, shape of the defect and the proximity to free margins a double Z-plasty was deemed most appropriate for repair. Using a sterile surgical marker, the appropriate transposition arms of the double Z-plasty were drawn incorporating the defect and placing the expected incisions within the relaxed skin tension lines where possible. The area thus outlined was incised deep to adipose tissue with a #15 scalpel blade. The skin margins were undermined to an appropriate distance in all directions utilizing iris scissors. The opposing transposition arms were then transposed and carried over into place in opposite direction and anchored with interrupted buried subcutaneous sutures.
Zygomaticofacial Flap Text: Given the location of the defect, shape of the defect and the proximity to free margins a zygomaticofacial flap was deemed most appropriate for repair. Using a sterile surgical marker, the appropriate flap was drawn incorporating the defect and placing the expected incisions within the relaxed skin tension lines where possible. The area thus outlined was incised deep to adipose tissue with a #15 scalpel blade with preservation of a vascular pedicle.  The skin margins were undermined to an appropriate distance in all directions utilizing iris scissors. The flap was then carried over into the defect and anchored with interrupted buried subcutaneous sutures.
Cheek Interpolation Flap Text: A decision was made to reconstruct the defect utilizing an interpolation axial flap and a staged reconstruction.  A telfa template was made of the defect.  This telfa template was then used to outline the Cheek Interpolation flap.  The donor area for the pedicle flap was then injected with anesthesia.  The flap was excised through the skin and subcutaneous tissue down to the layer of the underlying musculature.  The interpolation flap was carefully excised within this deep plane to maintain its blood supply.  The edges of the donor site were undermined.   The donor site was closed in a primary fashion.  The pedicle was then rotated into position and sutured.  Once the tube was sutured into place, adequate blood supply was confirmed with blanching and refill.  The pedicle was then wrapped with xeroform gauze and dressed appropriately with a telfa and gauze bandage to ensure continued blood supply and protect the attached pedicle.
Cheek-To-Nose Interpolation Flap Text: A decision was made to reconstruct the defect utilizing an interpolation axial flap and a staged reconstruction.  A telfa template was made of the defect.  This telfa template was then used to outline the Cheek-To-Nose Interpolation flap.  The donor area for the pedicle flap was then injected with anesthesia.  The flap was excised through the skin and subcutaneous tissue down to the layer of the underlying musculature.  The interpolation flap was carefully excised within this deep plane to maintain its blood supply.  The edges of the donor site were undermined.   The donor site was closed in a primary fashion.  The pedicle was then rotated into position and sutured.  Once the tube was sutured into place, adequate blood supply was confirmed with blanching and refill.  The pedicle was then wrapped with xeroform gauze and dressed appropriately with a telfa and gauze bandage to ensure continued blood supply and protect the attached pedicle.
Interpolation Flap Text: A decision was made to reconstruct the defect utilizing an interpolation axial flap and a staged reconstruction.  A telfa template was made of the defect.  This telfa template was then used to outline the interpolation flap.  The donor area for the pedicle flap was then injected with anesthesia.  The flap was excised through the skin and subcutaneous tissue down to the layer of the underlying musculature.  The interpolation flap was carefully excised within this deep plane to maintain its blood supply.  The edges of the donor site were undermined.   The donor site was closed in a primary fashion.  The pedicle was then rotated into position and sutured.  Once the tube was sutured into place, adequate blood supply was confirmed with blanching and refill.  The pedicle was then wrapped with xeroform gauze and dressed appropriately with a telfa and gauze bandage to ensure continued blood supply and protect the attached pedicle.
Melolabial Interpolation Flap Text: A decision was made to reconstruct the defect utilizing an interpolation axial flap and a staged reconstruction.  A telfa template was made of the defect.  This telfa template was then used to outline the melolabial interpolation flap.  The donor area for the pedicle flap was then injected with anesthesia.  The flap was excised through the skin and subcutaneous tissue down to the layer of the underlying musculature.  The pedicle flap was carefully excised within this deep plane to maintain its blood supply.  The edges of the donor site were undermined.   The donor site was closed in a primary fashion.  The pedicle was then rotated into position and sutured.  Once the tube was sutured into place, adequate blood supply was confirmed with blanching and refill.  The pedicle was then wrapped with xeroform gauze and dressed appropriately with a telfa and gauze bandage to ensure continued blood supply and protect the attached pedicle.
Mastoid Interpolation Flap Text: A decision was made to reconstruct the defect utilizing an interpolation axial flap and a staged reconstruction.  A telfa template was made of the defect.  This telfa template was then used to outline the mastoid interpolation flap.  The donor area for the pedicle flap was then injected with anesthesia.  The flap was excised through the skin and subcutaneous tissue down to the layer of the underlying musculature.  The pedicle flap was carefully excised within this deep plane to maintain its blood supply.  The edges of the donor site were undermined.   The donor site was closed in a primary fashion.  The pedicle was then rotated into position and sutured.  Once the tube was sutured into place, adequate blood supply was confirmed with blanching and refill.  The pedicle was then wrapped with xeroform gauze and dressed appropriately with a telfa and gauze bandage to ensure continued blood supply and protect the attached pedicle.
Posterior Auricular Interpolation Flap Text: A decision was made to reconstruct the defect utilizing an interpolation axial flap and a staged reconstruction.  A telfa template was made of the defect.  This telfa template was then used to outline the posterior auricular interpolation flap.  The donor area for the pedicle flap was then injected with anesthesia.  The flap was excised through the skin and subcutaneous tissue down to the layer of the underlying musculature.  The pedicle flap was carefully excised within this deep plane to maintain its blood supply.  The edges of the donor site were undermined.   The donor site was closed in a primary fashion.  The pedicle was then rotated into position and sutured.  Once the tube was sutured into place, adequate blood supply was confirmed with blanching and refill.  The pedicle was then wrapped with xeroform gauze and dressed appropriately with a telfa and gauze bandage to ensure continued blood supply and protect the attached pedicle.
Paramedian Forehead Flap Text: A decision was made to reconstruct the defect utilizing an interpolation axial flap and a staged reconstruction.  A telfa template was made of the defect.  This telfa template was then used to outline the paramedian forehead pedicle flap.  The donor area for the pedicle flap was then injected with anesthesia.  The flap was excised through the skin and subcutaneous tissue down to the layer of the underlying musculature.  The pedicle flap was carefully excised within this deep plane to maintain its blood supply.  The edges of the donor site were undermined.   The donor site was closed in a primary fashion.  The pedicle was then rotated into position and sutured.  Once the tube was sutured into place, adequate blood supply was confirmed with blanching and refill.  The pedicle was then wrapped with xeroform gauze and dressed appropriately with a telfa and gauze bandage to ensure continued blood supply and protect the attached pedicle.
Abbe Flap (Upper To Lower Lip) Text: The defect of the lower lip was assessed and measured.  Given the location and size of the defect, an Abbe flap was deemed most appropriate. Using a sterile surgical marker, an appropriate Abbe flap was measured and drawn on the upper lip. Local anesthesia was then infiltrated.  A scalpel was then used to incise the upper lip through and through the skin, vermilion, muscle and mucosa, leaving the flap pedicled on the opposite side.  The flap was then rotated and transferred to the lower lip defect.  The flap was then sutured into place with a three layer technique, closing the orbicularis oris muscle layer with subcutaneous buried sutures, followed by a mucosal layer and an epidermal layer.
Abbe Flap (Lower To Upper Lip) Text: The defect of the upper lip was assessed and measured.  Given the location and size of the defect, an Abbe flap was deemed most appropriate. Using a sterile surgical marker, an appropriate Abbe flap was measured and drawn on the lower lip. Local anesthesia was then infiltrated. A scalpel was then used to incise the upper lip through and through the skin, vermilion, muscle and mucosa, leaving the flap pedicled on the opposite side.  The flap was then rotated and transferred to the lower lip defect.  The flap was then sutured into place with a three layer technique, closing the orbicularis oris muscle layer with subcutaneous buried sutures, followed by a mucosal layer and an epidermal layer.
Estlander Flap (Upper To Lower Lip) Text: The defect of the lower lip was assessed and measured.  Given the location and size of the defect, an Estlander flap was deemed most appropriate. Using a sterile surgical marker, an appropriate Estlander flap was measured and drawn on the upper lip. Local anesthesia was then infiltrated. A scalpel was then used to incise the lateral aspect of the flap, through skin, muscle and mucosa, leaving the flap pedicled medially.  The flap was then rotated and positioned to fill the lower lip defect.  The flap was then sutured into place with a three layer technique, closing the orbicularis oris muscle layer with subcutaneous buried sutures, followed by a mucosal layer and an epidermal layer.
Lip Wedge Excision Repair Text: Given the location of the defect and the proximity to free margins a full thickness wedge repair was deemed most appropriate. Using a sterile surgical marker, the appropriate repair was drawn incorporating the defect and placing the expected incisions perpendicular to the vermilion border.  The vermilion border was also meticulously outlined to ensure appropriate reapproximation during the repair.  The area thus outlined was incised through and through with a #15 scalpel blade.  The muscularis and dermis were reaproximated with deep sutures following hemostasis. Care was taken to realign the vermilion border before proceeding with the superficial closure.  Once the vermilion was realigned the superfical and mucosal closure was finished.
Ftsg Text: The defect edges were debeveled with a #15 scalpel blade. Given the location of the defect, shape of the defect and the proximity to free margins a full thickness skin graft was deemed most appropriate. Using a sterile surgical marker, the primary defect shape was transferred to the donor site. The area thus outlined was incised deep to adipose tissue with a #15 scalpel blade.  The harvested graft was then trimmed of adipose tissue until only dermis and epidermis was left.  The skin margins of the secondary defect were undermined to an appropriate distance in all directions utilizing iris scissors.  The secondary defect was closed with interrupted buried subcutaneous sutures.  The skin edges were then re-apposed with running  sutures.  The skin graft was then placed in the primary defect and oriented appropriately.
Split-Thickness Skin Graft Text: The defect edges were debeveled with a #15 scalpel blade. Given the location of the defect, shape of the defect and the proximity to free margins a split thickness skin graft was deemed most appropriate. Using a sterile surgical marker, the primary defect shape was transferred to the donor site. The split thickness graft was then harvested.  The skin graft was then placed in the primary defect and oriented appropriately.
Pinch Graft Text: The defect edges were debeveled with a #15 scalpel blade. Given the location of the defect, shape of the defect and the proximity to free margins a pinch graft was deemed most appropriate. Using a sterile surgical marker, the primary defect shape was transferred to the donor site. The area thus outlined was incised deep to adipose tissue with a #15 scalpel blade.  The harvested graft was then trimmed of adipose tissue until only dermis and epidermis was left. The skin margins of the secondary defect were undermined to an appropriate distance in all directions utilizing iris scissors.  The secondary defect was closed with interrupted buried subcutaneous sutures.  The skin edges were then re-apposed with running  sutures.  The skin graft was then placed in the primary defect and oriented appropriately.
Burow's Graft Text: The defect edges were debeveled with a #15 scalpel blade. Given the location of the defect, shape of the defect, the proximity to free margins and the presence of a standing cone deformity a Burow's skin graft was deemed most appropriate. The standing cone was removed and this tissue was then trimmed to the shape of the primary defect. The adipose tissue was also removed until only dermis and epidermis were left.  The skin margins of the secondary defect were undermined to an appropriate distance in all directions utilizing iris scissors.  The secondary defect was closed with interrupted buried subcutaneous sutures.  The skin edges were then re-apposed with running  sutures.  The skin graft was then placed in the primary defect and oriented appropriately.
Cartilage Graft Text: The defect edges were debeveled with a #15 scalpel blade. Given the location of the defect, shape of the defect, the fact the defect involved a full thickness cartilage defect a cartilage graft was deemed most appropriate.  An appropriate donor site was identified, cleansed, and anesthetized. The cartilage graft was then harvested and transferred to the recipient site, oriented appropriately and then sutured into place.  The secondary defect was then repaired using a primary closure.
Composite Graft Text: The defect edges were debeveled with a #15 scalpel blade. Given the location of the defect, shape of the defect, the proximity to free margins and the fact the defect was full thickness a composite graft was deemed most appropriate.  The defect was outline and then transferred to the donor site.  A full thickness graft was then excised from the donor site. The graft was then placed in the primary defect, oriented appropriately and then sutured into place.  The secondary defect was then repaired using a primary closure.
Epidermal Autograft Text: The defect edges were debeveled with a #15 scalpel blade. Given the location of the defect, shape of the defect and the proximity to free margins an epidermal autograft was deemed most appropriate. Using a sterile surgical marker, the primary defect shape was transferred to the donor site. The epidermal graft was then harvested.  The skin graft was then placed in the primary defect and oriented appropriately.
Dermal Autograft Text: The defect edges were debeveled with a #15 scalpel blade. Given the location of the defect, shape of the defect and the proximity to free margins a dermal autograft was deemed most appropriate. Using a sterile surgical marker, the primary defect shape was transferred to the donor site. The area thus outlined was incised deep to adipose tissue with a #15 scalpel blade.  The harvested graft was then trimmed of adipose and epidermal tissue until only dermis was left.  The skin graft was then placed in the primary defect and oriented appropriately.
Skin Substitute Text: The defect edges were debeveled with a #15 scalpel blade. Given the location of the defect, shape of the defect and the proximity to free margins a skin substitute graft was deemed most appropriate.  The graft material was trimmed to fit the size of the defect. The graft was then placed in the primary defect and oriented appropriately.
Tissue Cultured Epidermal Autograft Text: The defect edges were debeveled with a #15 scalpel blade. Given the location of the defect, shape of the defect and the proximity to free margins a tissue cultured epidermal autograft was deemed most appropriate.  The graft was then trimmed to fit the size of the defect.  The graft was then placed in the primary defect and oriented appropriately.
Xenograft Text: The defect edges were debeveled with a #15 scalpel blade. Given the location of the defect, shape of the defect and the proximity to free margins a xenograft was deemed most appropriate.  The graft was then trimmed to fit the size of the defect.  The graft was then placed in the primary defect and oriented appropriately.
Purse String (Intermediate) Text: Given the location of the defect and the characteristics of the surrounding skin a purse string intermediate closure was deemed most appropriate.  Undermining was performed circumferentially around the surgical defect.  A purse string suture was then placed and tightened.
Purse String (Simple) Text: Given the location of the defect and the characteristics of the surrounding skin a purse string simple closure was deemed most appropriate.  Undermining was performed circumferentially around the surgical defect.  A purse string suture was then placed and tightened.
Partial Purse String (Intermediate) Text: Given the location of the defect and the characteristics of the surrounding skin an intermediate purse string closure was deemed most appropriate.  Undermining was performed circumferentially around the surgical defect.  A purse string suture was then placed and tightened. Wound tension of the circular defect prevented complete closure of the wound.
Partial Purse String (Simple) Text: Given the location of the defect and the characteristics of the surrounding skin a simple purse string closure was deemed most appropriate.  Undermining was performed circumferentially around the surgical defect.  A purse string suture was then placed and tightened. Wound tension of the circular defect prevented complete closure of the wound.
Complex Repair And Single Advancement Flap Text: The defect edges were debeveled with a #15 scalpel blade.  The primary defect was closed partially with a complex linear closure.  Given the location of the remaining defect, shape of the defect and the proximity to free margins a single advancement flap was deemed most appropriate for complete closure of the defect.  Using a sterile surgical marker, an appropriate advancement flap was drawn incorporating the defect and placing the expected incisions within the relaxed skin tension lines where possible. The area thus outlined was incised deep to adipose tissue with a #15 scalpel blade. The skin margins were undermined to an appropriate distance in all directions utilizing iris scissors and carried over to close the primary defect.
Complex Repair And Double Advancement Flap Text: The defect edges were debeveled with a #15 scalpel blade.  The primary defect was closed partially with a complex linear closure.  Given the location of the remaining defect, shape of the defect and the proximity to free margins a double advancement flap was deemed most appropriate for complete closure of the defect.  Using a sterile surgical marker, an appropriate advancement flap was drawn incorporating the defect and placing the expected incisions within the relaxed skin tension lines where possible. The area thus outlined was incised deep to adipose tissue with a #15 scalpel blade. The skin margins were undermined to an appropriate distance in all directions utilizing iris scissors and carried over to close the primary defect.
Complex Repair And Modified Advancement Flap Text: The defect edges were debeveled with a #15 scalpel blade.  The primary defect was closed partially with a complex linear closure.  Given the location of the remaining defect, shape of the defect and the proximity to free margins a modified advancement flap was deemed most appropriate for complete closure of the defect.  Using a sterile surgical marker, an appropriate advancement flap was drawn incorporating the defect and placing the expected incisions within the relaxed skin tension lines where possible. The area thus outlined was incised deep to adipose tissue with a #15 scalpel blade. The skin margins were undermined to an appropriate distance in all directions utilizing iris scissors and carried over to close the primary defect.
Complex Repair And A-T Advancement Flap Text: The defect edges were debeveled with a #15 scalpel blade.  The primary defect was closed partially with a complex linear closure.  Given the location of the remaining defect, shape of the defect and the proximity to free margins an A-T advancement flap was deemed most appropriate for complete closure of the defect.  Using a sterile surgical marker, an appropriate advancement flap was drawn incorporating the defect and placing the expected incisions within the relaxed skin tension lines where possible. The area thus outlined was incised deep to adipose tissue with a #15 scalpel blade. The skin margins were undermined to an appropriate distance in all directions utilizing iris scissors and carried over to close the primary defect.
Complex Repair And O-T Advancement Flap Text: The defect edges were debeveled with a #15 scalpel blade.  The primary defect was closed partially with a complex linear closure.  Given the location of the remaining defect, shape of the defect and the proximity to free margins an O-T advancement flap was deemed most appropriate for complete closure of the defect.  Using a sterile surgical marker, an appropriate advancement flap was drawn incorporating the defect and placing the expected incisions within the relaxed skin tension lines where possible. The area thus outlined was incised deep to adipose tissue with a #15 scalpel blade. The skin margins were undermined to an appropriate distance in all directions utilizing iris scissors and carried over to close the primary defect.
Complex Repair And O-L Flap Text: The defect edges were debeveled with a #15 scalpel blade.  The primary defect was closed partially with a complex linear closure.  Given the location of the remaining defect, shape of the defect and the proximity to free margins an O-L flap was deemed most appropriate for complete closure of the defect.  Using a sterile surgical marker, an appropriate flap was drawn incorporating the defect and placing the expected incisions within the relaxed skin tension lines where possible. The area thus outlined was incised deep to adipose tissue with a #15 scalpel blade. The skin margins were undermined to an appropriate distance in all directions utilizing iris scissors and carried over to close the primary defect.
Complex Repair And Bilobe Flap Text: The defect edges were debeveled with a #15 scalpel blade.  The primary defect was closed partially with a complex linear closure.  Given the location of the remaining defect, shape of the defect and the proximity to free margins a bilobe flap was deemed most appropriate for complete closure of the defect.  Using a sterile surgical marker, an appropriate advancement flap was drawn incorporating the defect and placing the expected incisions within the relaxed skin tension lines where possible. The area thus outlined was incised deep to adipose tissue with a #15 scalpel blade. The skin margins were undermined to an appropriate distance in all directions utilizing iris scissors and carried over to close the primary defect.
Complex Repair And Melolabial Flap Text: The defect edges were debeveled with a #15 scalpel blade.  The primary defect was closed partially with a complex linear closure.  Given the location of the remaining defect, shape of the defect and the proximity to free margins a melolabial flap was deemed most appropriate for complete closure of the defect.  Using a sterile surgical marker, an appropriate advancement flap was drawn incorporating the defect and placing the expected incisions within the relaxed skin tension lines where possible. The area thus outlined was incised deep to adipose tissue with a #15 scalpel blade. The skin margins were undermined to an appropriate distance in all directions utilizing iris scissors and carried over to close the primary defect.
Complex Repair And Rotation Flap Text: The defect edges were debeveled with a #15 scalpel blade.  The primary defect was closed partially with a complex linear closure.  Given the location of the remaining defect, shape of the defect and the proximity to free margins a rotation flap was deemed most appropriate for complete closure of the defect.  Using a sterile surgical marker, an appropriate advancement flap was drawn incorporating the defect and placing the expected incisions within the relaxed skin tension lines where possible. The area thus outlined was incised deep to adipose tissue with a #15 scalpel blade. The skin margins were undermined to an appropriate distance in all directions utilizing iris scissors and carried over to close the primary defect.
Complex Repair And Rhombic Flap Text: The defect edges were debeveled with a #15 scalpel blade.  The primary defect was closed partially with a complex linear closure.  Given the location of the remaining defect, shape of the defect and the proximity to free margins a rhombic flap was deemed most appropriate for complete closure of the defect.  Using a sterile surgical marker, an appropriate advancement flap was drawn incorporating the defect and placing the expected incisions within the relaxed skin tension lines where possible. The area thus outlined was incised deep to adipose tissue with a #15 scalpel blade. The skin margins were undermined to an appropriate distance in all directions utilizing iris scissors and carried over to close the primary defect.
Complex Repair And Transposition Flap Text: The defect edges were debeveled with a #15 scalpel blade.  The primary defect was closed partially with a complex linear closure.  Given the location of the remaining defect, shape of the defect and the proximity to free margins a transposition flap was deemed most appropriate for complete closure of the defect.  Using a sterile surgical marker, an appropriate advancement flap was drawn incorporating the defect and placing the expected incisions within the relaxed skin tension lines where possible. The area thus outlined was incised deep to adipose tissue with a #15 scalpel blade. The skin margins were undermined to an appropriate distance in all directions utilizing iris scissors and carried over to close the primary defect.
Complex Repair And V-Y Plasty Text: The defect edges were debeveled with a #15 scalpel blade.  The primary defect was closed partially with a complex linear closure.  Given the location of the remaining defect, shape of the defect and the proximity to free margins a V-Y plasty was deemed most appropriate for complete closure of the defect.  Using a sterile surgical marker, an appropriate advancement flap was drawn incorporating the defect and placing the expected incisions within the relaxed skin tension lines where possible. The area thus outlined was incised deep to adipose tissue with a #15 scalpel blade. The skin margins were undermined to an appropriate distance in all directions utilizing iris scissors and carried over to close the primary defect.
Complex Repair And M Plasty Text: The defect edges were debeveled with a #15 scalpel blade.  The primary defect was closed partially with a complex linear closure.  Given the location of the remaining defect, shape of the defect and the proximity to free margins an M plasty was deemed most appropriate for complete closure of the defect.  Using a sterile surgical marker, an appropriate advancement flap was drawn incorporating the defect and placing the expected incisions within the relaxed skin tension lines where possible. The area thus outlined was incised deep to adipose tissue with a #15 scalpel blade. The skin margins were undermined to an appropriate distance in all directions utilizing iris scissors and carried over to close the primary defect.
Complex Repair And Double M Plasty Text: The defect edges were debeveled with a #15 scalpel blade.  The primary defect was closed partially with a complex linear closure.  Given the location of the remaining defect, shape of the defect and the proximity to free margins a double M plasty was deemed most appropriate for complete closure of the defect.  Using a sterile surgical marker, an appropriate advancement flap was drawn incorporating the defect and placing the expected incisions within the relaxed skin tension lines where possible. The area thus outlined was incised deep to adipose tissue with a #15 scalpel blade. The skin margins were undermined to an appropriate distance in all directions utilizing iris scissors and carried over to close the primary defect.
Complex Repair And W Plasty Text: The defect edges were debeveled with a #15 scalpel blade.  The primary defect was closed partially with a complex linear closure.  Given the location of the remaining defect, shape of the defect and the proximity to free margins a W plasty was deemed most appropriate for complete closure of the defect.  Using a sterile surgical marker, an appropriate advancement flap was drawn incorporating the defect and placing the expected incisions within the relaxed skin tension lines where possible. The area thus outlined was incised deep to adipose tissue with a #15 scalpel blade. The skin margins were undermined to an appropriate distance in all directions utilizing iris scissors and carried over to close the primary defect.
Complex Repair And Z Plasty Text: The defect edges were debeveled with a #15 scalpel blade.  The primary defect was closed partially with a complex linear closure.  Given the location of the remaining defect, shape of the defect and the proximity to free margins a Z plasty was deemed most appropriate for complete closure of the defect.  Using a sterile surgical marker, an appropriate advancement flap was drawn incorporating the defect and placing the expected incisions within the relaxed skin tension lines where possible. The area thus outlined was incised deep to adipose tissue with a #15 scalpel blade. The skin margins were undermined to an appropriate distance in all directions utilizing iris scissors and carried over to close the primary defect.
Complex Repair And Dorsal Nasal Flap Text: The defect edges were debeveled with a #15 scalpel blade.  The primary defect was closed partially with a complex linear closure.  Given the location of the remaining defect, shape of the defect and the proximity to free margins a dorsal nasal flap was deemed most appropriate for complete closure of the defect.  Using a sterile surgical marker, an appropriate flap was drawn incorporating the defect and placing the expected incisions within the relaxed skin tension lines where possible. The area thus outlined was incised deep to adipose tissue with a #15 scalpel blade. The skin margins were undermined to an appropriate distance in all directions utilizing iris scissors and carried over to close the primary defect.
Complex Repair And Ftsg Text: The defect edges were debeveled with a #15 scalpel blade.  The primary defect was closed partially with a complex linear closure.  Given the location of the defect, shape of the defect and the proximity to free margins a full thickness skin graft was deemed most appropriate to repair the remaining defect.  The graft was trimmed to fit the size of the remaining defect.  The graft was then placed in the primary defect, oriented appropriately, and sutured into place.
Complex Repair And Burow's Graft Text: The defect edges were debeveled with a #15 scalpel blade.  The primary defect was closed partially with a complex linear closure.  Given the location of the defect, shape of the defect, the proximity to free margins and the presence of a standing cone deformity a Burow's graft was deemed most appropriate to repair the remaining defect.  The graft was trimmed to fit the size of the remaining defect.  The graft was then placed in the primary defect, oriented appropriately, and sutured into place.
Complex Repair And Split-Thickness Skin Graft Text: The defect edges were debeveled with a #15 scalpel blade.  The primary defect was closed partially with a complex linear closure.  Given the location of the defect, shape of the defect and the proximity to free margins a split thickness skin graft was deemed most appropriate to repair the remaining defect.  The graft was trimmed to fit the size of the remaining defect.  The graft was then placed in the primary defect, oriented appropriately, and sutured into place.
Complex Repair And Epidermal Autograft Text: The defect edges were debeveled with a #15 scalpel blade.  The primary defect was closed partially with a complex linear closure.  Given the location of the defect, shape of the defect and the proximity to free margins an epidermal autograft was deemed most appropriate to repair the remaining defect.  The graft was trimmed to fit the size of the remaining defect.  The graft was then placed in the primary defect, oriented appropriately, and sutured into place.
Complex Repair And Dermal Autograft Text: The defect edges were debeveled with a #15 scalpel blade.  The primary defect was closed partially with a complex linear closure.  Given the location of the defect, shape of the defect and the proximity to free margins an dermal autograft was deemed most appropriate to repair the remaining defect.  The graft was trimmed to fit the size of the remaining defect.  The graft was then placed in the primary defect, oriented appropriately, and sutured into place.
Complex Repair And Tissue Cultured Epidermal Autograft Text: The defect edges were debeveled with a #15 scalpel blade.  The primary defect was closed partially with a complex linear closure.  Given the location of the defect, shape of the defect and the proximity to free margins an tissue cultured epidermal autograft was deemed most appropriate to repair the remaining defect.  The graft was trimmed to fit the size of the remaining defect.  The graft was then placed in the primary defect, oriented appropriately, and sutured into place.
Complex Repair And Xenograft Text: The defect edges were debeveled with a #15 scalpel blade.  The primary defect was closed partially with a complex linear closure.  Given the location of the defect, shape of the defect and the proximity to free margins a xenograft was deemed most appropriate to repair the remaining defect.  The graft was trimmed to fit the size of the remaining defect.  The graft was then placed in the primary defect, oriented appropriately, and sutured into place.
Complex Repair And Skin Substitute Graft Text: The defect edges were debeveled with a #15 scalpel blade.  The primary defect was closed partially with a complex linear closure.  Given the location of the remaining defect, shape of the defect and the proximity to free margins a skin substitute graft was deemed most appropriate to repair the remaining defect.  The graft was trimmed to fit the size of the remaining defect.  The graft was then placed in the primary defect, oriented appropriately, and sutured into place.
Path Notes (To The Dermatopathologist): Please check margins.
Consent was obtained from the patient. The risks and benefits to therapy were discussed in detail. Specifically, the risks of infection, scarring, bleeding, prolonged wound healing, incomplete removal, allergy to anesthesia, nerve injury and recurrence were addressed. Prior to the procedure, the treatment site was clearly identified and confirmed by the patient. All components of Universal Protocol/PAUSE Rule completed.
Post-Care Instructions: I reviewed with the patient in detail post-care instructions. Patient is not to engage in any heavy lifting, exercise, or swimming for the next 14 days. Should the patient develop any fevers, chills, bleeding, severe pain patient will contact the office immediately.
Home Suture Removal Text: Patient was provided a home suture removal kit and will remove their sutures at home.  If they have any questions or difficulties they will call the office.
Where Do You Want The Question To Include Opioid Counseling Located?: Case Summary Tab
Information: Selecting Yes will display possible errors in your note based on the variables you have selected. This validation is only offered as a suggestion for you. PLEASE NOTE THAT THE VALIDATION TEXT WILL BE REMOVED WHEN YOU FINALIZE YOUR NOTE. IF YOU WANT TO FAX A PRELIMINARY NOTE YOU WILL NEED TO TOGGLE THIS TO 'NO' IF YOU DO NOT WANT IT IN YOUR FAXED NOTE.

## 2023-10-10 NOTE — PROCEDURE: MEDICATION COUNSELING
Klisyri Counseling:  I discussed with the patient the risks of Klisyri including but not limited to erythema, scaling, itching, weeping, crusting, and pain.
Taltz Counseling: I discussed with the patient the risks of ixekizumab including but not limited to immunosuppression, serious infections, worsening of inflammatory bowel disease and drug reactions.  The patient understands that monitoring is required including a PPD at baseline and must alert us or the primary physician if symptoms of infection or other concerning signs are noted.
Bactrim Counseling:  I discussed with the patient the risks of sulfa antibiotics including but not limited to GI upset, allergic reaction, drug rash, diarrhea, dizziness, photosensitivity, and yeast infections.  Rarely, more serious reactions can occur including but not limited to aplastic anemia, agranulocytosis, methemoglobinemia, blood dyscrasias, liver or kidney failure, lung infiltrates or desquamative/blistering drug rashes.
Acitretin Pregnancy And Lactation Text: This medication is Pregnancy Category X and should not be given to women who are pregnant or may become pregnant in the future. This medication is excreted in breast milk.
Winlevi Pregnancy And Lactation Text: This medication is considered safe during pregnancy and breastfeeding.
Rituxan Pregnancy And Lactation Text: This medication is Pregnancy Category C and it isn't know if it is safe during pregnancy. It is unknown if this medication is excreted in breast milk but similar antibodies are known to be excreted.
Erythromycin Pregnancy And Lactation Text: This medication is Pregnancy Category B and is considered safe during pregnancy. It is also excreted in breast milk.
Tranexamic Acid Pregnancy And Lactation Text: It is unknown if this medication is safe during pregnancy or breast feeding.
Birth Control Pills Pregnancy And Lactation Text: This medication should be avoided if pregnant and for the first 30 days post-partum.
Sarecycline Counseling: Patient advised regarding possible photosensitivity and discoloration of the teeth, skin, lips, tongue and gums.  Patient instructed to avoid sunlight, if possible.  When exposed to sunlight, patients should wear protective clothing, sunglasses, and sunscreen.  The patient was instructed to call the office immediately if the following severe adverse effects occur:  hearing changes, easy bruising/bleeding, severe headache, or vision changes.  The patient verbalized understanding of the proper use and possible adverse effects of sarecycline.  All of the patient's questions and concerns were addressed.
Adbry Pregnancy And Lactation Text: It is unknown if this medication will adversely affect pregnancy or breast feeding.
Picato Pregnancy And Lactation Text: This medication is Pregnancy Category C. It is unknown if this medication is excreted in breast milk.
Olumiant Counseling: I discussed with the patient the risks of Olumiant therapy including but not limited to upper respiratory tract infections, shingles, cold sores, and nausea. Live vaccines should be avoided.  This medication has been linked to serious infections; higher rate of mortality; malignancy and lymphoproliferative disorders; major adverse cardiovascular events; thrombosis; gastrointestinal perforations; neutropenia; lymphopenia; anemia; liver enzyme elevations; and lipid elevations.
Humira Counseling:  I discussed with the patient the risks of adalimumab including but not limited to myelosuppression, immunosuppression, autoimmune hepatitis, demyelinating diseases, lymphoma, and serious infections.  The patient understands that monitoring is required including a PPD at baseline and must alert us or the primary physician if symptoms of infection or other concerning signs are noted.
Doxepin Pregnancy And Lactation Text: This medication is Pregnancy Category C and it isn't known if it is safe during pregnancy. It is also excreted in breast milk and breast feeding isn't recommended.
Libtayo Counseling- I discussed with the patient the risks of Libtayo including but not limited to nausea, vomiting, diarrhea, and bone or muscle pain.  The patient verbalized understanding of the proper use and possible adverse effects of Libtayo.  All of the patient's questions and concerns were addressed.
Olumiant Pregnancy And Lactation Text: Based on animal studies, Olumiant may cause embryo-fetal harm when administered to pregnant women.  The medication should not be used in pregnancy.  Breastfeeding is not recommended during treatment.
Soolantra Pregnancy And Lactation Text: This medication is Pregnancy Category C. This medication is considered safe during breast feeding.
Humira Pregnancy And Lactation Text: This medication is Pregnancy Category B and is considered safe during pregnancy. It is unknown if this medication is excreted in breast milk.
Libtayo Pregnancy And Lactation Text: This medication is contraindicated in pregnancy and when breast feeding.
Otezla Counseling: The side effects of Otezla were discussed with the patient, including but not limited to worsening or new depression, weight loss, diarrhea, nausea, upper respiratory tract infection, and headache. Patient instructed to call the office should any adverse effect occur.  The patient verbalized understanding of the proper use and possible adverse effects of Otezla.  All the patient's questions and concerns were addressed.
Benzoyl Peroxide Counseling: Patient counseled that medicine may cause skin irritation and bleach clothing.  In the event of skin irritation, the patient was advised to reduce the amount of the drug applied or use it less frequently.   The patient verbalized understanding of the proper use and possible adverse effects of benzoyl peroxide.  All of the patient's questions and concerns were addressed.
Drysol Pregnancy And Lactation Text: This medication is considered safe during pregnancy and breast feeding.
Dapsone Counseling: I discussed with the patient the risks of dapsone including but not limited to hemolytic anemia, agranulocytosis, rashes, methemoglobinemia, kidney failure, peripheral neuropathy, headaches, GI upset, and liver toxicity.  Patients who start dapsone require monitoring including baseline LFTs and weekly CBCs for the first month, then every month thereafter.  The patient verbalized understanding of the proper use and possible adverse effects of dapsone.  All of the patient's questions and concerns were addressed.
Low Dose Naltrexone Pregnancy And Lactation Text: Naltrexone is pregnancy category C.  There have been no adequate and well-controlled studies in pregnant women.  It should be used in pregnancy only if the potential benefit justifies the potential risk to the fetus.   Limited data indicates that naltrexone is minimally excreted into breastmilk.
Itraconazole Pregnancy And Lactation Text: This medication is Pregnancy Category C and it isn't know if it is safe during pregnancy. It is also excreted in breast milk.
Cyclosporine Counseling:  I discussed with the patient the risks of cyclosporine including but not limited to hypertension, gingival hyperplasia,myelosuppression, immunosuppression, liver damage, kidney damage, neurotoxicity, lymphoma, and serious infections. The patient understands that monitoring is required including baseline blood pressure, CBC, CMP, lipid panel and uric acid, and then 1-2 times monthly CMP and blood pressure.
Topical Metronidazole Counseling: Metronidazole is a topical antibiotic medication. You may experience burning, stinging, redness, or allergic reactions.  Please call our office if you develop any problems from using this medication.
Ketoconazole Counseling:   Patient counseled regarding improving absorption with orange juice.  Adverse effects include but are not limited to breast enlargement, headache, diarrhea, nausea, upset stomach, liver function test abnormalities, taste disturbance, and stomach pain.  There is a rare possibility of liver failure that can occur when taking ketoconazole. The patient understands that monitoring of LFTs may be required, especially at baseline. The patient verbalized understanding of the proper use and possible adverse effects of ketoconazole.  All of the patient's questions and concerns were addressed.
Elidel Counseling: Patient may experience a mild burning sensation during topical application. Elidel is not approved in children less than 2 years of age. There have been case reports of hematologic and skin malignancies in patients using topical calcineurin inhibitors although causality is questionable.
Niacinamide Counseling: I recommended taking niacin or niacinamide, also know as vitamin B3, twice daily. Recent evidence suggests that taking vitamin B3 (500 mg twice daily) can reduce the risk of actinic keratoses and non-melanoma skin cancers. Side effects of vitamin B3 include flushing and headache.
Taltz Pregnancy And Lactation Text: The risk during pregnancy and breastfeeding is uncertain with this medication.
Bactrim Pregnancy And Lactation Text: This medication is Pregnancy Category D and is known to cause fetal risk.  It is also excreted in breast milk.
Dapsone Pregnancy And Lactation Text: This medication is Pregnancy Category C and is not considered safe during pregnancy or breast feeding.
Siliq Counseling:  I discussed with the patient the risks of Siliq including but not limited to new or worsening depression, suicidal thoughts and behavior, immunosuppression, malignancy, posterior leukoencephalopathy syndrome, and serious infections.  The patient understands that monitoring is required including a PPD at baseline and must alert us or the primary physician if symptoms of infection or other concerning signs are noted. There is also a special program designed to monitor depression which is required with Siliq.
Spironolactone Counseling: Patient advised regarding risks of diarrhea, abdominal pain, hyperkalemia, birth defects (for female patients), liver toxicity and renal toxicity. The patient may need blood work to monitor liver and kidney function and potassium levels while on therapy. The patient verbalized understanding of the proper use and possible adverse effects of spironolactone.  All of the patient's questions and concerns were addressed.
Benzoyl Peroxide Pregnancy And Lactation Text: This medication is Pregnancy Category C. It is unknown if benzoyl peroxide is excreted in breast milk.
Bexarotene Counseling:  I discussed with the patient the risks of bexarotene including but not limited to hair loss, dry lips/skin/eyes, liver abnormalities, hyperlipidemia, pancreatitis, depression/suicidal ideation, photosensitivity, drug rash/allergic reactions, hypothyroidism, anemia, leukopenia, infection, cataracts, and teratogenicity.  Patient understands that they will need regular blood tests to check lipid profile, liver function tests, white blood cell count, thyroid function tests and pregnancy test if applicable.
VTAMA Counseling: I discussed with the patient that VTAMA is not for use in the eyes, mouth or mouth. They should call the office if they develop any signs of allergic reactions to VTAMA. The patient verbalized understanding of the proper use and possible adverse effects of VTAMA.  All of the patient's questions and concerns were addressed.
Sarecycline Pregnancy And Lactation Text: This medication is Pregnancy Category D and not consider safe during pregnancy. It is also excreted in breast milk.
Klisyri Pregnancy And Lactation Text: It is unknown if this medication can harm a developing fetus or if it is excreted in breast milk.
Hydroxyzine Counseling: Patient advised that the medication is sedating and not to drive a car after taking this medication.  Patient informed of potential adverse effects including but not limited to dry mouth, urinary retention, and blurry vision.  The patient verbalized understanding of the proper use and possible adverse effects of hydroxyzine.  All of the patient's questions and concerns were addressed.
Valtrex Counseling: I discussed with the patient the risks of valacyclovir including but not limited to kidney damage, nausea, vomiting and severe allergy.  The patient understands that if the infection seems to be worsening or is not improving, they are to call.
Opioid Counseling: I discussed with the patient the potential side effects of opioids including but not limited to addiction, altered mental status, and depression. I stressed avoiding alcohol, benzodiazepines, muscle relaxants and sleep aids unless specifically okayed by a physician. The patient verbalized understanding of the proper use and possible adverse effects of opioids. All of the patient's questions and concerns were addressed. They were instructed to flush the remaining pills down the toilet if they did not need them for pain.
Protopic Counseling: Patient may experience a mild burning sensation during topical application. Protopic is not approved in children less than 2 years of age. There have been case reports of hematologic and skin malignancies in patients using topical calcineurin inhibitors although causality is questionable.
Metronidazole Counseling:  I discussed with the patient the risks of metronidazole including but not limited to seizures, nausea/vomiting, a metallic taste in the mouth, nausea/vomiting and severe allergy.
Cimzia Counseling:  I discussed with the patient the risks of Cimzia including but not limited to immunosuppression, allergic reactions and infections.  The patient understands that monitoring is required including a PPD at baseline and must alert us or the primary physician if symptoms of infection or other concerning signs are noted.
Ilumya Counseling: I discussed with the patient the risks of tildrakizumab including but not limited to immunosuppression, malignancy, posterior leukoencephalopathy syndrome, and serious infections.  The patient understands that monitoring is required including a PPD at baseline and must alert us or the primary physician if symptoms of infection or other concerning signs are noted.
Cimzia Pregnancy And Lactation Text: This medication crosses the placenta but can be considered safe in certain situations. Cimzia may be excreted in breast milk.
Protopic Pregnancy And Lactation Text: This medication is Pregnancy Category C. It is unknown if this medication is excreted in breast milk when applied topically.
Tetracycline Counseling: Patient counseled regarding possible photosensitivity and increased risk for sunburn.  Patient instructed to avoid sunlight, if possible.  When exposed to sunlight, patients should wear protective clothing, sunglasses, and sunscreen.  The patient was instructed to call the office immediately if the following severe adverse effects occur:  hearing changes, easy bruising/bleeding, severe headache, or vision changes.  The patient verbalized understanding of the proper use and possible adverse effects of tetracycline.  All of the patient's questions and concerns were addressed. Patient understands to avoid pregnancy while on therapy due to potential birth defects.
Opioid Pregnancy And Lactation Text: These medications can lead to premature delivery and should be avoided during pregnancy. These medications are also present in breast milk in small amounts.
Hydroxyzine Pregnancy And Lactation Text: This medication is not safe during pregnancy and should not be taken. It is also excreted in breast milk and breast feeding isn't recommended.
Rinvoq Counseling: I discussed with the patient the risks of Rinvoq therapy including but not limited to upper respiratory tract infections, shingles, cold sores, bronchitis, nausea, cough, fever, acne, and headache. Live vaccines should be avoided.  This medication has been linked to serious infections; higher rate of mortality; malignancy and lymphoproliferative disorders; major adverse cardiovascular events; thrombosis; thrombocytopenia, anemia, and neutropenia; lipid elevations; liver enzyme elevations; and gastrointestinal perforations.
Topical Retinoid counseling:  Patient advised to apply a pea-sized amount only at bedtime and wait 30 minutes after washing their face before applying.  If too drying, patient may add a non-comedogenic moisturizer. The patient verbalized understanding of the proper use and possible adverse effects of retinoids.  All of the patient's questions and concerns were addressed.
Odomzo Counseling- I discussed with the patient the risks of Odomzo including but not limited to nausea, vomiting, diarrhea, constipation, weight loss, changes in the sense of taste, decreased appetite, muscle spasms, and hair loss.  The patient verbalized understanding of the proper use and possible adverse effects of Odomzo.  All of the patient's questions and concerns were addressed.
Otezla Pregnancy And Lactation Text: This medication is Pregnancy Category C and it isn't known if it is safe during pregnancy. It is unknown if it is excreted in breast milk.
Cyclosporine Pregnancy And Lactation Text: This medication is Pregnancy Category C and it isn't know if it is safe during pregnancy. This medication is excreted in breast milk.
Topical Metronidazole Pregnancy And Lactation Text: This medication is Pregnancy Category B and considered safe during pregnancy.  It is also considered safe to use while breastfeeding.
Topical Steroids Counseling: I discussed with the patient that prolonged use of topical steroids can result in the increased appearance of superficial blood vessels (telangiectasias), lightening (hypopigmentation) and thinning of the skin (atrophy).  Patient understands to avoid using high potency steroids in skin folds, the groin or the face.  The patient verbalized understanding of the proper use and possible adverse effects of topical steroids.  All of the patient's questions and concerns were addressed.
Oxybutynin Counseling:  I discussed with the patient the risks of oxybutynin including but not limited to skin rash, drowsiness, dry mouth, difficulty urinating, and blurred vision.
Niacinamide Pregnancy And Lactation Text: These medications are considered safe during pregnancy.
Methotrexate Counseling:  Patient counseled regarding adverse effects of methotrexate including but not limited to nausea, vomiting, abnormalities in liver function tests. Patients may develop mouth sores, rash, diarrhea, and abnormalities in blood counts. The patient understands that monitoring is required including LFT's and blood counts.  There is a rare possibility of scarring of the liver and lung problems that can occur when taking methotrexate. Persistent nausea, loss of appetite, pale stools, dark urine, cough, and shortness of breath should be reported immediately. Patient advised to discontinue methotrexate treatment at least three months before attempting to become pregnant.  I discussed the need for folate supplements while taking methotrexate.  These supplements can decrease side effects during methotrexate treatment. The patient verbalized understanding of the proper use and possible adverse effects of methotrexate.  All of the patient's questions and concerns were addressed.
Cephalexin Counseling: I counseled the patient regarding use of cephalexin as an antibiotic for prophylactic and/or therapeutic purposes. Cephalexin (commonly prescribed under brand name Keflex) is a cephalosporin antibiotic which is active against numerous classes of bacteria, including most skin bacteria. Side effects may include nausea, diarrhea, gastrointestinal upset, rash, hives, yeast infections, and in rare cases, hepatitis, kidney disease, seizures, fever, confusion, neurologic symptoms, and others. Patients with severe allergies to penicillin medications are cautioned that there is about a 10% incidence of cross-reactivity with cephalosporins. When possible, patients with penicillin allergies should use alternatives to cephalosporins for antibiotic therapy.
Vtama Pregnancy And Lactation Text: It is unknown if this medication can cause problems during pregnancy and breastfeeding.
Carac Counseling:  I discussed with the patient the risks of Carac including but not limited to erythema, scaling, itching, weeping, crusting, and pain.
Bexarotene Pregnancy And Lactation Text: This medication is Pregnancy Category X and should not be given to women who are pregnant or may become pregnant. This medication should not be used if you are breast feeding.
Tremfya Counseling: I discussed with the patient the risks of guselkumab including but not limited to immunosuppression, serious infections, and drug reactions.  The patient understands that monitoring is required including a PPD at baseline and must alert us or the primary physician if symptoms of infection or other concerning signs are noted.
Metronidazole Pregnancy And Lactation Text: This medication is Pregnancy Category B and considered safe during pregnancy.  It is also excreted in breast milk.
Ketoconazole Pregnancy And Lactation Text: This medication is Pregnancy Category C and it isn't know if it is safe during pregnancy. It is also excreted in breast milk and breast feeding isn't recommended.
Valtrex Pregnancy And Lactation Text: this medication is Pregnancy Category B and is considered safe during pregnancy. This medication is not directly found in breast milk but it's metabolite acyclovir is present.
Gabapentin Counseling: I discussed with the patient the risks of gabapentin including but not limited to dizziness, somnolence, fatigue and ataxia.
Minoxidil Counseling: Minoxidil is a topical medication which can increase blood flow where it is applied. It is uncertain how this medication increases hair growth. Side effects are uncommon and include stinging and allergic reactions.
Spironolactone Pregnancy And Lactation Text: This medication can cause feminization of the male fetus and should be avoided during pregnancy. The active metabolite is also found in breast milk.
Cosentyx Counseling:  I discussed with the patient the risks of Cosentyx including but not limited to worsening of Crohn's disease, immunosuppression, allergic reactions and infections.  The patient understands that monitoring is required including a PPD at baseline and must alert us or the primary physician if symptoms of infection or other concerning signs are noted.
Minoxidil Pregnancy And Lactation Text: This medication has not been assigned a Pregnancy Risk Category but animal studies failed to show danger with the topical medication. It is unknown if the medication is excreted in breast milk.
Use Enhanced Medication Counseling?: No
High Dose Vitamin A Counseling: Side effects reviewed, pt to contact office should one occur.
Minocycline Counseling: Patient advised regarding possible photosensitivity and discoloration of the teeth, skin, lips, tongue and gums.  Patient instructed to avoid sunlight, if possible.  When exposed to sunlight, patients should wear protective clothing, sunglasses, and sunscreen.  The patient was instructed to call the office immediately if the following severe adverse effects occur:  hearing changes, easy bruising/bleeding, severe headache, or vision changes.  The patient verbalized understanding of the proper use and possible adverse effects of minocycline.  All of the patient's questions and concerns were addressed.
Odomzo Pregnancy And Lactation Text: This medication is Pregnancy Category X and is absolutely contraindicated during pregnancy. It is unknown if it is excreted in breast milk.
Detail Level: Detailed
Qbrexza Counseling:  I discussed with the patient the risks of Qbrexza including but not limited to headache, mydriasis, blurred vision, dry eyes, nasal dryness, dry mouth, dry throat, dry skin, urinary hesitation, and constipation.  Local skin reactions including erythema, burning, stinging, and itching can also occur.
Rinvoq Pregnancy And Lactation Text: Based on animal studies, Rinvoq may cause embryo-fetal harm when administered to pregnant women.  The medication should not be used in pregnancy.  Breastfeeding is not recommended during treatment and for 6 days after the last dose.
Tazorac Counseling:  Patient advised that medication is irritating and drying.  Patient may need to apply sparingly and wash off after an hour before eventually leaving it on overnight.  The patient verbalized understanding of the proper use and possible adverse effects of tazorac.  All of the patient's questions and concerns were addressed.
Albendazole Counseling:  I discussed with the patient the risks of albendazole including but not limited to cytopenia, kidney damage, nausea/vomiting and severe allergy.  The patient understands that this medication is being used in an off-label manner.
Terbinafine Counseling: Patient counseling regarding adverse effects of terbinafine including but not limited to headache, diarrhea, rash, upset stomach, liver function test abnormalities, itching, taste/smell disturbance, nausea, abdominal pain, and flatulence.  There is a rare possibility of liver failure that can occur when taking terbinafine.  The patient understands that a baseline LFT and kidney function test may be required. The patient verbalized understanding of the proper use and possible adverse effects of terbinafine.  All of the patient's questions and concerns were addressed.
Azathioprine Counseling:  I discussed with the patient the risks of azathioprine including but not limited to myelosuppression, immunosuppression, hepatotoxicity, lymphoma, and infections.  The patient understands that monitoring is required including baseline LFTs, Creatinine, possible TPMP genotyping and weekly CBCs for the first month and then every 2 weeks thereafter.  The patient verbalized understanding of the proper use and possible adverse effects of azathioprine.  All of the patient's questions and concerns were addressed.
Arava Counseling:  Patient counseled regarding adverse effects of Arava including but not limited to nausea, vomiting, abnormalities in liver function tests. Patients may develop mouth sores, rash, diarrhea, and abnormalities in blood counts. The patient understands that monitoring is required including LFTs and blood counts.  There is a rare possibility of scarring of the liver and lung problems that can occur when taking methotrexate. Persistent nausea, loss of appetite, pale stools, dark urine, cough, and shortness of breath should be reported immediately. Patient advised to discontinue Arava treatment and consult with a physician prior to attempting conception. The patient will have to undergo a treatment to eliminate Arava from the body prior to conception.
Oxybutynin Pregnancy And Lactation Text: This medication is Pregnancy Category B and is considered safe during pregnancy. It is unknown if it is excreted in breast milk.
Gabapentin Pregnancy And Lactation Text: This medication is Pregnancy Category C and isn't considered safe during pregnancy. It is excreted in breast milk.
Methotrexate Pregnancy And Lactation Text: This medication is Pregnancy Category X and is known to cause fetal harm. This medication is excreted in breast milk.
Nsaids Counseling: NSAID Counseling: I discussed with the patient that NSAIDs should be taken with food. Prolonged use of NSAIDs can result in the development of stomach ulcers.  Patient advised to stop taking NSAIDs if abdominal pain occurs.  The patient verbalized understanding of the proper use and possible adverse effects of NSAIDs.  All of the patient's questions and concerns were addressed.
Topical Steroids Applications Pregnancy And Lactation Text: Most topical steroids are considered safe to use during pregnancy and lactation.  Any topical steroid applied to the breast or nipple should be washed off before breastfeeding.
Carac Pregnancy And Lactation Text: This medication is Pregnancy Category X and contraindicated in pregnancy and in women who may become pregnant. It is unknown if this medication is excreted in breast milk.
Eucrisa Counseling: Patient may experience a mild burning sensation during topical application. Eucrisa is not approved in children less than 2 years of age.
Isotretinoin Counseling: Patient should get monthly blood tests, not donate blood, not drive at night if vision affected, not share medication, and not undergo elective surgery for 6 months after tx completed. Side effects reviewed, pt to contact office should one occur.
Simponi Counseling:  I discussed with the patient the risks of golimumab including but not limited to myelosuppression, immunosuppression, autoimmune hepatitis, demyelinating diseases, lymphoma, and serious infections.  The patient understands that monitoring is required including a PPD at baseline and must alert us or the primary physician if symptoms of infection or other concerning signs are noted.
Cephalexin Pregnancy And Lactation Text: This medication is Pregnancy Category B and considered safe during pregnancy.  It is also excreted in breast milk but can be used safely for shorter doses.
Mirvaso Counseling: Mirvaso is a topical medication which can decrease superficial blood flow where applied. Side effects are uncommon and include stinging, redness and allergic reactions.
Xolair Counseling:  Patient informed of potential adverse effects including but not limited to fever, muscle aches, rash and allergic reactions.  The patient verbalized understanding of the proper use and possible adverse effects of Xolair.  All of the patient's questions and concerns were addressed.
Clindamycin Counseling: I counseled the patient regarding use of clindamycin as an antibiotic for prophylactic and/or therapeutic purposes. Clindamycin is active against numerous classes of bacteria, including skin bacteria. Side effects may include nausea, diarrhea, gastrointestinal upset, rash, hives, yeast infections, and in rare cases, colitis.
Isotretinoin Pregnancy And Lactation Text: This medication is Pregnancy Category X and is considered extremely dangerous during pregnancy. It is unknown if it is excreted in breast milk.
Dutasteride Male Counseling: Dustasteride Counseling:  I discussed with the patient the risks of use of dutasteride including but not limited to decreased libido, decreased ejaculate volume, and gynecomastia. Women who can become pregnant should not handle medication.  All of the patient's questions and concerns were addressed.
High Dose Vitamin A Pregnancy And Lactation Text: High dose vitamin A therapy is contraindicated during pregnancy and breast feeding.
Qbrexza Pregnancy And Lactation Text: There is no available data on Qbrexza use in pregnant women.  There is no available data on Qbrexza use in lactation.
Sotyktu Counseling:  I discussed the most common side effects of Sotyktu including: common cold, sore throat, sinus infections, cold sores, canker sores, folliculitis, and acne.  I also discussed more serious side effects of Sotyktu including but not limited to: serious allergic reactions; increased risk for infections such as TB; cancers such as lymphomas; rhabdomyolysis and elevated CPK; and elevated triglycerides and liver enzymes. 
Tazorac Pregnancy And Lactation Text: This medication is not safe during pregnancy. It is unknown if this medication is excreted in breast milk.
Sotyktu Pregnancy And Lactation Text: There is insufficient data to evaluate whether or not Sotyktu is safe to use during pregnancy.   It is not known if Sotyktu passes into breast milk and whether or not it is safe to use when breastfeeding.  
Calcipotriene Pregnancy And Lactation Text: The use of this medication during pregnancy or lactation is not recommended as there is insufficient data.
Azathioprine Pregnancy And Lactation Text: This medication is Pregnancy Category D and isn't considered safe during pregnancy. It is unknown if this medication is excreted in breast milk.
Albendazole Pregnancy And Lactation Text: This medication is Pregnancy Category C and it isn't known if it is safe during pregnancy. It is also excreted in breast milk.
Fluconazole Counseling:  Patient counseled regarding adverse effects of fluconazole including but not limited to headache, diarrhea, nausea, upset stomach, liver function test abnormalities, taste disturbance, and stomach pain.  There is a rare possibility of liver failure that can occur when taking fluconazole.  The patient understands that monitoring of LFTs and kidney function test may be required, especially at baseline. The patient verbalized understanding of the proper use and possible adverse effects of fluconazole.  All of the patient's questions and concerns were addressed.
Calcipotriene Counseling:  I discussed with the patient the risks of calcipotriene including but not limited to erythema, scaling, itching, and irritation.
Propranolol Counseling:  I discussed with the patient the risks of propranolol including but not limited to low heart rate, low blood pressure, low blood sugar, restlessness and increased cold sensitivity. They should call the office if they experience any of these side effects.
Prednisone Counseling:  I discussed with the patient the risks of prolonged use of prednisone including but not limited to weight gain, insomnia, osteoporosis, mood changes, diabetes, susceptibility to infection, glaucoma and high blood pressure.  In cases where prednisone use is prolonged, patients should be monitored with blood pressure checks, serum glucose levels and an eye exam.  Additionally, the patient may need to be placed on GI prophylaxis, PCP prophylaxis, and calcium and vitamin D supplementation and/or a bisphosphonate.  The patient verbalized understanding of the proper use and the possible adverse effects of prednisone.  All of the patient's questions and concerns were addressed.
Glycopyrrolate Counseling:  I discussed with the patient the risks of glycopyrrolate including but not limited to skin rash, drowsiness, dry mouth, difficulty urinating, and blurred vision.
Nsaids Pregnancy And Lactation Text: These medications are considered safe up to 30 weeks gestation. It is excreted in breast milk.
Terbinafine Pregnancy And Lactation Text: This medication is Pregnancy Category B and is considered safe during pregnancy. It is also excreted in breast milk and breast feeding isn't recommended.
Topical Sulfur Applications Counseling: Topical Sulfur Counseling: Patient counseled that this medication may cause skin irritation or allergic reactions.  In the event of skin irritation, the patient was advised to reduce the amount of the drug applied or use it less frequently.   The patient verbalized understanding of the proper use and possible adverse effects of topical sulfur application.  All of the patient's questions and concerns were addressed.
Hydroquinone Counseling:  Patient advised that medication may result in skin irritation, lightening (hypopigmentation), dryness, and burning.  In the event of skin irritation, the patient was advised to reduce the amount of the drug applied or use it less frequently.  Rarely, spots that are treated with hydroquinone can become darker (pseudoochronosis).  Should this occur, patient instructed to stop medication and call the office. The patient verbalized understanding of the proper use and possible adverse effects of hydroquinone.  All of the patient's questions and concerns were addressed.
Skyrizi Counseling: I discussed with the patient the risks of risankizumab-rzaa including but not limited to immunosuppression, and serious infections.  The patient understands that monitoring is required including a PPD at baseline and must alert us or the primary physician if symptoms of infection or other concerning signs are noted.
Topical Sulfur Applications Pregnancy And Lactation Text: This medication is Pregnancy Category C and has an unknown safety profile during pregnancy. It is unknown if this topical medication is excreted in breast milk.
Ivermectin Counseling:  Patient instructed to take medication on an empty stomach with a full glass of water.  Patient informed of potential adverse effects including but not limited to nausea, diarrhea, dizziness, itching, and swelling of the extremities or lymph nodes.  The patient verbalized understanding of the proper use and possible adverse effects of ivermectin.  All of the patient's questions and concerns were addressed.
Xolair Pregnancy And Lactation Text: This medication is Pregnancy Category B and is considered safe during pregnancy. This medication is excreted in breast milk.
Clindamycin Pregnancy And Lactation Text: This medication can be used in pregnancy if certain situations. Clindamycin is also present in breast milk.
Dutasteride Pregnancy And Lactation Text: This medication is absolutely contraindicated in women, especially during pregnancy and breast feeding. Feminization of male fetuses is possible if taking while pregnant.
Cantharidin Counseling:  I discussed with the patient the risks of Cantharidin including but not limited to pain, redness, burning, itching, and blistering.
Mirvaso Pregnancy And Lactation Text: This medication has not been assigned a Pregnancy Risk Category. It is unknown if the medication is excreted in breast milk.
Quinolones Counseling:  I discussed with the patient the risks of fluoroquinolones including but not limited to GI upset, allergic reaction, drug rash, diarrhea, dizziness, photosensitivity, yeast infections, liver function test abnormalities, tendonitis/tendon rupture.
Rhofade Counseling: Rhofade is a topical medication which can decrease superficial blood flow where applied. Side effects are uncommon and include stinging, redness and allergic reactions.
Dupixent Counseling: I discussed with the patient the risks of dupilumab including but not limited to eye infection and irritation, cold sores, injection site reactions, worsening of asthma, allergic reactions and increased risk of parasitic infection.  Live vaccines should be avoided while taking dupilumab. Dupilumab will also interact with certain medications such as warfarin and cyclosporine. The patient understands that monitoring is required and they must alert us or the primary physician if symptoms of infection or other concerning signs are noted.
Dupixent Pregnancy And Lactation Text: This medication likely crosses the placenta but the risk for the fetus is uncertain. This medication is excreted in breast milk.
Cantharidin Pregnancy And Lactation Text: This medication has not been proven safe during pregnancy. It is unknown if this medication is excreted in breast milk.
Xeljanz Counseling: I discussed with the patient the risks of Xeljanz therapy including increased risk of infection, liver issues, headache, diarrhea, or cold symptoms. Live vaccines should be avoided. They were instructed to call if they have any problems.
Topical Clindamycin Counseling: Patient counseled that this medication may cause skin irritation or allergic reactions.  In the event of skin irritation, the patient was advised to reduce the amount of the drug applied or use it less frequently.   The patient verbalized understanding of the proper use and possible adverse effects of clindamycin.  All of the patient's questions and concerns were addressed.
Cellcept Counseling:  I discussed with the patient the risks of mycophenolate mofetil including but not limited to infection/immunosuppression, GI upset, hypokalemia, hypercholesterolemia, bone marrow suppression, lymphoproliferative disorders, malignancy, GI ulceration/bleed/perforation, colitis, interstitial lung disease, kidney failure, progressive multifocal leukoencephalopathy, and birth defects.  The patient understands that monitoring is required including a baseline creatinine and regular CBC testing. In addition, patient must alert us immediately if symptoms of infection or other concerning signs are noted.
Zoryve Counseling:  I discussed with the patient that Zoryve is not for use in the eyes, mouth or vagina. The most commonly reported side effects include diarrhea, headache, insomnia, application site pain, upper respiratory tract infections, and urinary tract infections.  All of the patient's questions and concerns were addressed.
Olanzapine Counseling- I discussed with the patient the common side effects of olanzapine including but are not limited to: lack of energy, dry mouth, increased appetite, sleepiness, tremor, constipation, dizziness, changes in behavior, or restlessness.  Explained that teenagers are more likely to experience headaches, abdominal pain, pain in the arms or legs, tiredness, and sleepiness.  Serious side effects include but are not limited: increased risk of death in elderly patients who are confused, have memory loss, or dementia-related psychosis; hyperglycemia; increased cholesterol and triglycerides; and weight gain.
Aklief counseling:  Patient advised to apply a pea-sized amount only at bedtime and wait 30 minutes after washing their face before applying.  If too drying, patient may add a non-comedogenic moisturizer.  The most commonly reported side effects including irritation, redness, scaling, dryness, stinging, burning, itching, and increased risk of sunburn.  The patient verbalized understanding of the proper use and possible adverse effects of retinoids.  All of the patient's questions and concerns were addressed.
Propranolol Pregnancy And Lactation Text: This medication is Pregnancy Category C and it isn't known if it is safe during pregnancy. It is excreted in breast milk.
Clofazimine Counseling:  I discussed with the patient the risks of clofazimine including but not limited to skin and eye pigmentation, liver damage, nausea/vomiting, gastrointestinal bleeding and allergy.
Glycopyrrolate Pregnancy And Lactation Text: This medication is Pregnancy Category B and is considered safe during pregnancy. It is unknown if it is excreted breast milk.
5-Fu Counseling: 5-Fluorouracil Counseling:  I discussed with the patient the risks of 5-fluorouracil including but not limited to erythema, scaling, itching, weeping, crusting, and pain.
SSKI Counseling:  I discussed with the patient the risks of SSKI including but not limited to thyroid abnormalities, metallic taste, GI upset, fever, headache, acne, arthralgias, paraesthesias, lymphadenopathy, easy bleeding, arrhythmias, and allergic reaction.
Griseofulvin Counseling:  I discussed with the patient the risks of griseofulvin including but not limited to photosensitivity, cytopenia, liver damage, nausea/vomiting and severe allergy.  The patient understands that this medication is best absorbed when taken with a fatty meal (e.g., ice cream or french fries).
Wartpeel Counseling:  I discussed with the patient the risks of Wartpeel including but not limited to erythema, scaling, itching, weeping, crusting, and pain.
Olanzapine Pregnancy And Lactation Text: This medication is pregnancy category C.   There are no adequate and well controlled trials with olanzapine in pregnant females.  Olanzapine should be used during pregnancy only if the potential benefit justifies the potential risk to the fetus.   In a study in lactating healthy women, olanzapine was excreted in breast milk.  It is recommended that women taking olanzapine should not breast feed.
Doxycycline Counseling:  Patient counseled regarding possible photosensitivity and increased risk for sunburn.  Patient instructed to avoid sunlight, if possible.  When exposed to sunlight, patients should wear protective clothing, sunglasses, and sunscreen.  The patient was instructed to call the office immediately if the following severe adverse effects occur:  hearing changes, easy bruising/bleeding, severe headache, or vision changes.  The patient verbalized understanding of the proper use and possible adverse effects of doxycycline.  All of the patient's questions and concerns were addressed.
Hydroxychloroquine Counseling:  I discussed with the patient that a baseline ophthalmologic exam is needed at the start of therapy and every year thereafter while on therapy. A CBC may also be warranted for monitoring.  The side effects of this medication were discussed with the patient, including but not limited to agranulocytosis, aplastic anemia, seizures, rashes, retinopathy, and liver toxicity. Patient instructed to call the office should any adverse effect occur.  The patient verbalized understanding of the proper use and possible adverse effects of Plaquenil.  All the patient's questions and concerns were addressed.
Aklief Pregnancy And Lactation Text: It is unknown if this medication is safe to use during pregnancy.  It is unknown if this medication is excreted in breast milk.  Breastfeeding women should use the topical cream on the smallest area of the skin for the shortest time needed while breastfeeding.  Do not apply to nipple and areola.
Finasteride Male Counseling: Finasteride Counseling:  I discussed with the patient the risks of use of finasteride including but not limited to decreased libido, decreased ejaculate volume, gynecomastia, and depression. Women should not handle medication.  All of the patient's questions and concerns were addressed.
Infliximab Counseling:  I discussed with the patient the risks of infliximab including but not limited to myelosuppression, immunosuppression, autoimmune hepatitis, demyelinating diseases, lymphoma, and serious infections.  The patient understands that monitoring is required including a PPD at baseline and must alert us or the primary physician if symptoms of infection or other concerning signs are noted.
Cimetidine Counseling:  I discussed with the patient the risks of Cimetidine including but not limited to gynecomastia, headache, diarrhea, nausea, drowsiness, arrhythmias, pancreatitis, skin rashes, psychosis, bone marrow suppression and kidney toxicity.
Opzelura Counseling:  I discussed with the patient the risks of Opzelura including but not limited to nasopharngitis, bronchitis, ear infection, eosinophila, hives, diarrhea, folliculitis, tonsillitis, and rhinorrhea.  Taken orally, this medication has been linked to serious infections; higher rate of mortality; malignancy and lymphoproliferative disorders; major adverse cardiovascular events; thrombosis; thrombocytopenia, anemia, and neutropenia; and lipid elevations.
Enbrel Counseling:  I discussed with the patient the risks of etanercept including but not limited to myelosuppression, immunosuppression, autoimmune hepatitis, demyelinating diseases, lymphoma, and infections.  The patient understands that monitoring is required including a PPD at baseline and must alert us or the primary physician if symptoms of infection or other concerning signs are noted.
Opzelura Pregnancy And Lactation Text: There is insufficient data to evaluate drug-associated risk for major birth defects, miscarriage, or other adverse maternal or fetal outcomes.  There is a pregnancy registry that monitors pregnancy outcomes in pregnant persons exposed to the medication during pregnancy.  It is unknown if this medication is excreted in breast milk.  Do not breastfeed during treatment and for about 4 weeks after the last dose.
Rifampin Counseling: I discussed with the patient the risks of rifampin including but not limited to liver damage, kidney damage, red-orange body fluids, nausea/vomiting and severe allergy.
Cibinqo Counseling: I discussed with the patient the risks of Cibinqo therapy including but not limited to common cold, nausea, headache, cold sores, increased blood CPK levels, dizziness, UTIs, fatigue, acne, and vomitting. Live vaccines should be avoided.  This medication has been linked to serious infections; higher rate of mortality; malignancy and lymphoproliferative disorders; major adverse cardiovascular events; thrombosis; thrombocytopenia and lymphopenia; lipid elevations; and retinal detachment.
Solaraze Counseling:  I discussed with the patient the risks of Solaraze including but not limited to erythema, scaling, itching, weeping, crusting, and pain.
Xelsevenz Pregnancy And Lactation Text: This medication is Pregnancy Category D and is not considered safe during pregnancy.  The risk during breast feeding is also uncertain.
Erivedge Counseling- I discussed with the patient the risks of Erivedge including but not limited to nausea, vomiting, diarrhea, constipation, weight loss, changes in the sense of taste, decreased appetite, muscle spasms, and hair loss.  The patient verbalized understanding of the proper use and possible adverse effects of Erivedge.  All of the patient's questions and concerns were addressed.
Topical Ketoconazole Counseling: Patient counseled that this medication may cause skin irritation or allergic reactions.  In the event of skin irritation, the patient was advised to reduce the amount of the drug applied or use it less frequently.   The patient verbalized understanding of the proper use and possible adverse effects of ketoconazole.  All of the patient's questions and concerns were addressed.
Zyclara Counseling:  I discussed with the patient the risks of imiquimod including but not limited to erythema, scaling, itching, weeping, crusting, and pain.  Patient understands that the inflammatory response to imiquimod is variable from person to person and was educated regarded proper titration schedule.  If flu-like symptoms develop, patient knows to discontinue the medication and contact us.
Cyclophosphamide Counseling:  I discussed with the patient the risks of cyclophosphamide including but not limited to hair loss, hormonal abnormalities, decreased fertility, abdominal pain, diarrhea, nausea and vomiting, bone marrow suppression and infection. The patient understands that monitoring is required while taking this medication.
Colchicine Counseling:  Patient counseled regarding adverse effects including but not limited to stomach upset (nausea, vomiting, stomach pain, or diarrhea).  Patient instructed to limit alcohol consumption while taking this medication.  Colchicine may reduce blood counts especially with prolonged use.  The patient understands that monitoring of kidney function and blood counts may be required, especially at baseline. The patient verbalized understanding of the proper use and possible adverse effects of colchicine.  All of the patient's questions and concerns were addressed.
Azithromycin Counseling:  I discussed with the patient the risks of azithromycin including but not limited to GI upset, allergic reaction, drug rash, diarrhea, and yeast infections.
Azelaic Acid Counseling: Patient counseled that medicine may cause skin irritation and to avoid applying near the eyes.  In the event of skin irritation, the patient was advised to reduce the amount of the drug applied or use it less frequently.   The patient verbalized understanding of the proper use and possible adverse effects of azelaic acid.  All of the patient's questions and concerns were addressed.
Sski Pregnancy And Lactation Text: This medication is Pregnancy Category D and isn't considered safe during pregnancy. It is excreted in breast milk.
Doxycycline Pregnancy And Lactation Text: This medication is Pregnancy Category D and not consider safe during pregnancy. It is also excreted in breast milk but is considered safe for shorter treatment courses.
Hydroxychloroquine Pregnancy And Lactation Text: This medication has been shown to cause fetal harm but it isn't assigned a Pregnancy Risk Category. There are small amounts excreted in breast milk.
Oral Minoxidil Counseling- I discussed with the patient the risks of oral minoxidil including but not limited to shortness of breath, swelling of the feet or ankles, dizziness, lightheadedness, unwanted hair growth and allergic reaction.  The patient verbalized understanding of the proper use and possible adverse effects of oral minoxidil.  All of the patient's questions and concerns were addressed.
Griseofulvin Pregnancy And Lactation Text: This medication is Pregnancy Category X and is known to cause serious birth defects. It is unknown if this medication is excreted in breast milk but breast feeding should be avoided.
Imiquimod Counseling:  I discussed with the patient the risks of imiquimod including but not limited to erythema, scaling, itching, weeping, crusting, and pain.  Patient understands that the inflammatory response to imiquimod is variable from person to person and was educated regarded proper titration schedule.  If flu-like symptoms develop, patient knows to discontinue the medication and contact us.
Finasteride Pregnancy And Lactation Text: This medication is absolutely contraindicated during pregnancy. It is unknown if it is excreted in breast milk.
Stelara Counseling:  I discussed with the patient the risks of ustekinumab including but not limited to immunosuppression, malignancy, posterior leukoencephalopathy syndrome, and serious infections.  The patient understands that monitoring is required including a PPD at baseline and must alert us or the primary physician if symptoms of infection or other concerning signs are noted.
Birth Control Pills Counseling: Birth Control Pill Counseling: I discussed with the patient the potential side effects of OCPs including but not limited to increased risk of stroke, heart attack, thrombophlebitis, deep venous thrombosis, hepatic adenomas, breast changes, GI upset, headaches, and depression.  The patient verbalized understanding of the proper use and possible adverse effects of OCPs. All of the patient's questions and concerns were addressed.
Tranexamic Acid Counseling:  Patient advised of the small risk of bleeding problems with tranexamic acid. They were also instructed to call if they developed any nausea, vomiting or diarrhea. All of the patient's questions and concerns were addressed.
Erythromycin Counseling:  I discussed with the patient the risks of erythromycin including but not limited to GI upset, allergic reaction, drug rash, diarrhea, increase in liver enzymes, and yeast infections.
Rifampin Pregnancy And Lactation Text: This medication is Pregnancy Category C and it isn't know if it is safe during pregnancy. It is also excreted in breast milk and should not be used if you are breast feeding.
Doxepin Counseling:  Patient advised that the medication is sedating and not to drive a car after taking this medication. Patient informed of potential adverse effects including but not limited to dry mouth, urinary retention, and blurry vision.  The patient verbalized understanding of the proper use and possible adverse effects of doxepin.  All of the patient's questions and concerns were addressed.
Picato Counseling:  I discussed with the patient the risks of Picato including but not limited to erythema, scaling, itching, weeping, crusting, and pain.
Adbry Counseling: I discussed with the patient the risks of tralokinumab including but not limited to eye infection and irritation, cold sores, injection site reactions, worsening of asthma, allergic reactions and increased risk of parasitic infection.  Live vaccines should be avoided while taking tralokinumab. The patient understands that monitoring is required and they must alert us or the primary physician if symptoms of infection or other concerning signs are noted.
Cibinqo Pregnancy And Lactation Text: It is unknown if this medication will adversely affect pregnancy or breast feeding.  You should not take this medication if you are currently pregnant or planning a pregnancy or while breastfeeding.
Solaraze Pregnancy And Lactation Text: This medication is Pregnancy Category B and is considered safe. There is some data to suggest avoiding during the third trimester. It is unknown if this medication is excreted in breast milk.
Soolantra Counseling: I discussed with the patients the risks of topial Soolantra. This is a medicine which decreases the number of mites and inflammation in the skin. You experience burning, stinging, eye irritation or allergic reactions.  Please call our office if you develop any problems from using this medication.
Oral Minoxidil Pregnancy And Lactation Text: This medication should only be used when clearly needed if you are pregnant, attempting to become pregnant or breast feeding.
Cyclophosphamide Pregnancy And Lactation Text: This medication is Pregnancy Category D and it isn't considered safe during pregnancy. This medication is excreted in breast milk.
Low Dose Naltrexone Counseling- I discussed with the patient the potential risks and side effects of low dose naltrexone including but not limited to: more vivid dreams, headaches, nausea, vomiting, abdominal pain, fatigue, dizziness, and anxiety.
Thalidomide Counseling: I discussed with the patient the risks of thalidomide including but not limited to birth defects, anxiety, weakness, chest pain, dizziness, cough and severe allergy.
Itraconazole Counseling:  I discussed with the patient the risks of itraconazole including but not limited to liver damage, nausea/vomiting, neuropathy, and severe allergy.  The patient understands that this medication is best absorbed when taken with acidic beverages such as non-diet cola or ginger ale.  The patient understands that monitoring is required including baseline LFTs and repeat LFTs at intervals.  The patient understands that they are to contact us or the primary physician if concerning signs are noted.
Winlevi Counseling:  I discussed with the patient the risks of topical clascoterone including but not limited to erythema, scaling, itching, and stinging. Patient voiced their understanding.
Acitretin Counseling:  I discussed with the patient the risks of acitretin including but not limited to hair loss, dry lips/skin/eyes, liver damage, hyperlipidemia, depression/suicidal ideation, photosensitivity.  Serious rare side effects can include but are not limited to pancreatitis, pseudotumor cerebri, bony changes, clot formation/stroke/heart attack.  Patient understands that alcohol is contraindicated since it can result in liver toxicity and significantly prolong the elimination of the drug by many years.
Drysol Counseling:  I discussed with the patient the risks of drysol/aluminum chloride including but not limited to skin rash, itching, irritation, burning.
Rituxan Counseling:  I discussed with the patient the risks of Rituxan infusions. Side effects can include infusion reactions, severe drug rashes including mucocutaneous reactions, reactivation of latent hepatitis and other infections and rarely progressive multifocal leukoencephalopathy.  All of the patient's questions and concerns were addressed.
Azithromycin Pregnancy And Lactation Text: This medication is considered safe during pregnancy and is also secreted in breast milk.

## 2023-10-13 ENCOUNTER — APPOINTMENT (RX ONLY)
Dept: URBAN - METROPOLITAN AREA CLINIC 36 | Facility: CLINIC | Age: 73
Setting detail: DERMATOLOGY
End: 2023-10-13

## 2023-10-13 DIAGNOSIS — Z48.817 ENCOUNTER FOR SURGICAL AFTERCARE FOLLOWING SURGERY ON THE SKIN AND SUBCUTANEOUS TISSUE: ICD-10-CM

## 2023-10-13 PROCEDURE — ? POST-OP WOUND CHECK

## 2023-10-13 ASSESSMENT — LOCATION SIMPLE DESCRIPTION DERM: LOCATION SIMPLE: LEFT CALF

## 2023-10-13 ASSESSMENT — LOCATION DETAILED DESCRIPTION DERM: LOCATION DETAILED: LEFT DISTAL CALF

## 2023-10-13 ASSESSMENT — LOCATION ZONE DERM: LOCATION ZONE: LEG

## 2023-10-13 NOTE — PROCEDURE: POST-OP WOUND CHECK
Detail Level: Detailed
Add 92049 Cpt? (Important Note: In 2017 The Use Of 49767 Is Being Tracked By Cms To Determine Future Global Period Reimbursement For Global Periods): no
Wound Evaluated By: Marichuy Yen RN

## 2023-11-01 DIAGNOSIS — E78.2 MIXED HYPERLIPIDEMIA WITH APOLIPOPROTEIN E3 VARIANT: ICD-10-CM

## 2023-11-01 RX ORDER — EZETIMIBE 10 MG/1
10 TABLET ORAL DAILY
Qty: 90 TABLET | Refills: 0 | Status: SHIPPED | OUTPATIENT
Start: 2023-11-01 | End: 2024-02-05

## 2023-11-07 ENCOUNTER — APPOINTMENT (RX ONLY)
Dept: URBAN - METROPOLITAN AREA CLINIC 31 | Facility: CLINIC | Age: 73
Setting detail: DERMATOLOGY
End: 2023-11-07

## 2023-11-07 DIAGNOSIS — Z48.02 ENCOUNTER FOR REMOVAL OF SUTURES: ICD-10-CM

## 2023-11-07 PROCEDURE — ? SUTURE REMOVAL (GLOBAL PERIOD)

## 2023-11-07 ASSESSMENT — LOCATION DETAILED DESCRIPTION DERM: LOCATION DETAILED: LEFT DISTAL CALF

## 2023-11-07 ASSESSMENT — LOCATION SIMPLE DESCRIPTION DERM: LOCATION SIMPLE: LEFT CALF

## 2023-11-07 ASSESSMENT — LOCATION ZONE DERM: LOCATION ZONE: LEG

## 2023-11-07 NOTE — PROCEDURE: SUTURE REMOVAL (GLOBAL PERIOD)
Detail Level: Detailed
Add 94052 Cpt? (Important Note: In 2017 The Use Of 03235 Is Being Tracked By Cms To Determine Future Global Period Reimbursement For Global Periods): no

## 2023-11-15 ENCOUNTER — HOSPITAL ENCOUNTER (OUTPATIENT)
Dept: CARDIOLOGY | Facility: MEDICAL CENTER | Age: 73
End: 2023-11-15
Attending: FAMILY MEDICINE
Payer: MEDICARE

## 2023-11-15 DIAGNOSIS — R93.1 DECREASED CARDIAC EJECTION FRACTION: ICD-10-CM

## 2023-11-15 DIAGNOSIS — R00.1 BRADYCARDIA, SINUS: ICD-10-CM

## 2023-11-15 PROCEDURE — 93306 TTE W/DOPPLER COMPLETE: CPT

## 2023-11-16 LAB
LV EJECT FRACT  99904: 64
LV EJECT FRACT MOD 2C 99903: 65.52
LV EJECT FRACT MOD 4C 99902: 65.28
LV EJECT FRACT MOD BP 99901: 64.54

## 2023-11-16 PROCEDURE — 93306 TTE W/DOPPLER COMPLETE: CPT | Mod: 26 | Performed by: INTERNAL MEDICINE

## 2023-12-10 DIAGNOSIS — E78.2 MIXED HYPERLIPIDEMIA WITH APOLIPOPROTEIN E3 VARIANT: ICD-10-CM

## 2023-12-11 RX ORDER — ROSUVASTATIN CALCIUM 5 MG/1
5 TABLET, COATED ORAL
Qty: 45 TABLET | Refills: 3 | Status: SHIPPED | OUTPATIENT
Start: 2023-12-11

## 2024-01-06 DIAGNOSIS — J01.40 ACUTE NON-RECURRENT PANSINUSITIS: ICD-10-CM

## 2024-01-06 RX ORDER — AMOXICILLIN AND CLAVULANATE POTASSIUM 875; 125 MG/1; MG/1
1 TABLET, FILM COATED ORAL 2 TIMES DAILY
Qty: 14 TABLET | Refills: 0 | Status: SHIPPED | OUTPATIENT
Start: 2024-01-06 | End: 2024-01-13

## 2024-01-30 ENCOUNTER — HOSPITAL ENCOUNTER (OUTPATIENT)
Dept: RADIOLOGY | Facility: MEDICAL CENTER | Age: 74
End: 2024-01-30
Attending: FAMILY MEDICINE
Payer: MEDICARE

## 2024-01-30 ENCOUNTER — OFFICE VISIT (OUTPATIENT)
Dept: INTERNAL MEDICINE | Facility: IMAGING CENTER | Age: 74
End: 2024-01-30
Payer: MEDICARE

## 2024-01-30 VITALS
DIASTOLIC BLOOD PRESSURE: 64 MMHG | BODY MASS INDEX: 27.41 KG/M2 | HEIGHT: 75 IN | WEIGHT: 220.46 LBS | SYSTOLIC BLOOD PRESSURE: 126 MMHG | HEART RATE: 52 BPM | RESPIRATION RATE: 17 BRPM | OXYGEN SATURATION: 97 % | TEMPERATURE: 97.9 F

## 2024-01-30 DIAGNOSIS — G89.29 CHRONIC PAIN OF BOTH KNEES: ICD-10-CM

## 2024-01-30 DIAGNOSIS — M17.12 ARTHRITIS OF KNEE, LEFT: ICD-10-CM

## 2024-01-30 DIAGNOSIS — M25.561 CHRONIC PAIN OF BOTH KNEES: ICD-10-CM

## 2024-01-30 DIAGNOSIS — M25.562 CHRONIC PAIN OF BOTH KNEES: ICD-10-CM

## 2024-01-30 DIAGNOSIS — M17.11 ARTHRITIS OF RIGHT KNEE: ICD-10-CM

## 2024-01-30 DIAGNOSIS — Z12.11 COLON CANCER SCREENING: ICD-10-CM

## 2024-01-30 PROCEDURE — 3074F SYST BP LT 130 MM HG: CPT | Performed by: FAMILY MEDICINE

## 2024-01-30 PROCEDURE — 99214 OFFICE O/P EST MOD 30 MIN: CPT | Performed by: FAMILY MEDICINE

## 2024-01-30 PROCEDURE — 73564 X-RAY EXAM KNEE 4 OR MORE: CPT | Mod: LT

## 2024-01-30 PROCEDURE — 73564 X-RAY EXAM KNEE 4 OR MORE: CPT | Mod: RT

## 2024-01-30 PROCEDURE — 3078F DIAST BP <80 MM HG: CPT | Performed by: FAMILY MEDICINE

## 2024-01-30 ASSESSMENT — PATIENT HEALTH QUESTIONNAIRE - PHQ9: CLINICAL INTERPRETATION OF PHQ2 SCORE: 0

## 2024-01-30 ASSESSMENT — FIBROSIS 4 INDEX: FIB4 SCORE: 1.98

## 2024-01-30 NOTE — PROGRESS NOTES
"Chief Complaint   Patient presents with    Knee Pain     Bilateral knee pain right is worse than left. This has been present x6-7 weeks.        HPI:  Patient is a 73 y.o. male established patient who presents today to discuss bilateral knee pain that has been increasing over the past 6-7 weeks. He reports having a \"general ache\" deep in each knee joint (R>L), most noticeable when skiing and after walking on sand in [place] recently. Patient denies recent trauma/no fall history and has taken infrequent Advil for management. He endorses not being consistent with pre and post activity stretching and is not icing knees afterwards. He denies knee joint swelling/redness and is walking without troubles today.     Patient Active Problem List    Diagnosis Date Noted    Self-catheterizes urinary bladder 07/05/2023    Atherosclerosis of abdominal aorta, mild (US June 2022) 12/16/2022    History of recurrent UTI (urinary tract infection) 04/15/2022    Bladder retention of urine, chronic 04/15/2022    History of iron deficiency 11/16/2020    Bradycardia, sinus 03/03/2020    Family history of heart failure 07/27/2018    Decreased cardiac ejection fraction at 50% 06/29/2018    Atherosclerosis of right carotid artery, mild (US June 2022) 06/29/2018    Statin intolerance (high doses) 06/29/2018    PAD (peripheral artery disease) (HCC) 06/29/2018    Abnormal stress echocardiogram 06/29/2018    Family history of ischemic heart disease (IHD) 06/29/2018    Insulin resistance 05/29/2018    Thickened cIMT (Vascular age of >80 vs Chronological Age of 67, in 2018) 05/29/2018    Elevated creatine kinase level 05/29/2018    Calculus of gallbladder without cholecystitis without obstruction 06/05/2017    Asymptomatic PVCs 06/05/2017    Allergic rhinitis 05/01/2017    Dupuytren's contracture of left hand 05/01/2017    Vitamin D deficiency 01/29/2016    1st degree AV block 07/14/2015    Acquired hypothyroidism 12/08/2014    Mixed hyperlipidemia " "with apolipoprotein E3 variant 11/24/2014    Diverticulosis of colon 10/08/2013    BPH with obstruction/lower urinary tract symptoms 11/07/2012       Past medical, surgical, family, and social history was reviewed and updated in Epic chart by me today.     Medications and allergies reviewed and updated in Epic chart by me today.     ROS:  Pertinent positives listed above in HPI. All other systems have been reviewed and are negative.    PE:   /64 (BP Location: Left arm, Patient Position: Sitting, BP Cuff Size: Adult)   Pulse (!) 52   Temp 36.6 °C (97.9 °F) (Temporal)   Resp 17   Ht 1.905 m (6' 3\")   Wt 100 kg (220 lb 7.4 oz)   SpO2 97%   BMI 27.56 kg/m²   Vital signs reviewed with patient.     Gen: Well developed; well nourished; no acute distress; age appropriate appearance   Lower extremities: No peripheral edema b/l LE extremities/ no clubbing nor cyanosis noted; no gross deformities b/l knee joint  Skin: Warm and dry; no rashes noted   Neuro: No focal deficits noted; independent ambulation observed   Psych: AAOx4; mood and affect are appropriate    A/P:  1. Chronic pain of both knees  Ongoing issue as described above in HPI. I suspect patient is suffering from osteoarthritis pain, and recommend patient obtain bilateral knee xrays as first step in management. I also recommend pre and post workout body stretching as well as icing knees for 20 minutes after activity. I will follow up with patient when imaging results are available.   - DX-KNEE COMPLETE 4+ LEFT; Future  - DX-KNEE COMPLETE 4+ RIGHT; Future    2. Colon cancer screening  Patient has active referral to [facility], and I reminded him to contact their office for appointment scheduling.        "

## 2024-02-04 DIAGNOSIS — E78.2 MIXED HYPERLIPIDEMIA WITH APOLIPOPROTEIN E3 VARIANT: ICD-10-CM

## 2024-02-05 RX ORDER — EZETIMIBE 10 MG/1
10 TABLET ORAL DAILY
Qty: 90 TABLET | Refills: 1 | Status: SHIPPED | OUTPATIENT
Start: 2024-02-05

## 2024-02-28 ENCOUNTER — NON-PROVIDER VISIT (OUTPATIENT)
Dept: INTERNAL MEDICINE | Facility: IMAGING CENTER | Age: 74
End: 2024-02-28
Payer: MEDICARE

## 2024-02-28 PROCEDURE — 99999 PR NO CHARGE: CPT

## 2024-04-06 ENCOUNTER — PATIENT MESSAGE (OUTPATIENT)
Dept: INTERNAL MEDICINE | Facility: IMAGING CENTER | Age: 74
End: 2024-04-06
Payer: MEDICARE

## 2024-04-06 DIAGNOSIS — E03.9 HYPOTHYROIDISM (ACQUIRED): ICD-10-CM

## 2024-04-06 DIAGNOSIS — E78.2 MIXED HYPERLIPIDEMIA WITH APOLIPOPROTEIN E3 VARIANT: ICD-10-CM

## 2024-04-08 RX ORDER — ROSUVASTATIN CALCIUM 5 MG/1
5 TABLET, COATED ORAL
Qty: 45 TABLET | Refills: 1 | Status: SHIPPED | OUTPATIENT
Start: 2024-04-08

## 2024-04-08 RX ORDER — EZETIMIBE 10 MG/1
10 TABLET ORAL DAILY
Qty: 90 TABLET | Refills: 1 | Status: SHIPPED | OUTPATIENT
Start: 2024-04-08

## 2024-04-08 RX ORDER — LEVOTHYROXINE SODIUM 0.07 MG/1
75 TABLET ORAL
Qty: 90 TABLET | Refills: 1 | Status: SHIPPED | OUTPATIENT
Start: 2024-04-08

## 2024-08-21 ENCOUNTER — APPOINTMENT (RX ONLY)
Dept: URBAN - METROPOLITAN AREA CLINIC 31 | Facility: CLINIC | Age: 74
Setting detail: DERMATOLOGY
End: 2024-08-21

## 2024-08-21 DIAGNOSIS — Z85.828 PERSONAL HISTORY OF OTHER MALIGNANT NEOPLASM OF SKIN: ICD-10-CM

## 2024-08-21 DIAGNOSIS — Z71.89 OTHER SPECIFIED COUNSELING: ICD-10-CM

## 2024-08-21 DIAGNOSIS — L57.0 ACTINIC KERATOSIS: ICD-10-CM

## 2024-08-21 DIAGNOSIS — D22 MELANOCYTIC NEVI: ICD-10-CM

## 2024-08-21 DIAGNOSIS — D485 NEOPLASM OF UNCERTAIN BEHAVIOR OF SKIN: ICD-10-CM

## 2024-08-21 DIAGNOSIS — L82.1 OTHER SEBORRHEIC KERATOSIS: ICD-10-CM

## 2024-08-21 DIAGNOSIS — L81.4 OTHER MELANIN HYPERPIGMENTATION: ICD-10-CM

## 2024-08-21 DIAGNOSIS — D18.0 HEMANGIOMA: ICD-10-CM

## 2024-08-21 PROBLEM — D18.01 HEMANGIOMA OF SKIN AND SUBCUTANEOUS TISSUE: Status: ACTIVE | Noted: 2024-08-21

## 2024-08-21 PROBLEM — D22.5 MELANOCYTIC NEVI OF TRUNK: Status: ACTIVE | Noted: 2024-08-21

## 2024-08-21 PROBLEM — D22.72 MELANOCYTIC NEVI OF LEFT LOWER LIMB, INCLUDING HIP: Status: ACTIVE | Noted: 2024-08-21

## 2024-08-21 PROBLEM — D48.5 NEOPLASM OF UNCERTAIN BEHAVIOR OF SKIN: Status: ACTIVE | Noted: 2024-08-21

## 2024-08-21 PROBLEM — D22.61 MELANOCYTIC NEVI OF RIGHT UPPER LIMB, INCLUDING SHOULDER: Status: ACTIVE | Noted: 2024-08-21

## 2024-08-21 PROBLEM — D22.62 MELANOCYTIC NEVI OF LEFT UPPER LIMB, INCLUDING SHOULDER: Status: ACTIVE | Noted: 2024-08-21

## 2024-08-21 PROBLEM — D22.71 MELANOCYTIC NEVI OF RIGHT LOWER LIMB, INCLUDING HIP: Status: ACTIVE | Noted: 2024-08-21

## 2024-08-21 PROCEDURE — 17000 DESTRUCT PREMALG LESION: CPT | Mod: 59

## 2024-08-21 PROCEDURE — 17003 DESTRUCT PREMALG LES 2-14: CPT

## 2024-08-21 PROCEDURE — ? COUNSELING

## 2024-08-21 PROCEDURE — 99213 OFFICE O/P EST LOW 20 MIN: CPT | Mod: 25

## 2024-08-21 PROCEDURE — 11102 TANGNTL BX SKIN SINGLE LES: CPT

## 2024-08-21 PROCEDURE — ? BIOPSY BY SHAVE METHOD

## 2024-08-21 PROCEDURE — ? LIQUID NITROGEN

## 2024-08-21 ASSESSMENT — LOCATION SIMPLE DESCRIPTION DERM
LOCATION SIMPLE: POSTERIOR SCALP
LOCATION SIMPLE: LEFT THIGH
LOCATION SIMPLE: LEFT UPPER BACK
LOCATION SIMPLE: LEFT HAND
LOCATION SIMPLE: SCALP
LOCATION SIMPLE: RIGHT SCALP
LOCATION SIMPLE: RIGHT SHOULDER
LOCATION SIMPLE: LEFT FOREARM
LOCATION SIMPLE: RIGHT HAND
LOCATION SIMPLE: LEFT SHOULDER
LOCATION SIMPLE: RIGHT UPPER ARM
LOCATION SIMPLE: LOWER BACK
LOCATION SIMPLE: RIGHT CHEEK
LOCATION SIMPLE: RIGHT FOREARM
LOCATION SIMPLE: LEFT CALF
LOCATION SIMPLE: UPPER BACK
LOCATION SIMPLE: RIGHT LOWER BACK
LOCATION SIMPLE: RIGHT CALF
LOCATION SIMPLE: RIGHT THIGH
LOCATION SIMPLE: LEFT UPPER ARM
LOCATION SIMPLE: RIGHT UPPER BACK
LOCATION SIMPLE: LEFT CHEEK
LOCATION SIMPLE: ABDOMEN
LOCATION SIMPLE: LEFT EAR

## 2024-08-21 ASSESSMENT — LOCATION DETAILED DESCRIPTION DERM
LOCATION DETAILED: RIGHT PROXIMAL POSTERIOR UPPER ARM
LOCATION DETAILED: RIGHT SUPERIOR MEDIAL MIDBACK
LOCATION DETAILED: LEFT ANTERIOR DISTAL UPPER ARM
LOCATION DETAILED: LEFT LATERAL MALAR CHEEK
LOCATION DETAILED: RIGHT POSTERIOR SHOULDER
LOCATION DETAILED: INFERIOR THORACIC SPINE
LOCATION DETAILED: LEFT POSTERIOR SHOULDER
LOCATION DETAILED: POSTERIOR MID-PARIETAL SCALP
LOCATION DETAILED: LEFT SUPERIOR MEDIAL MALAR CHEEK
LOCATION DETAILED: SUPERIOR LUMBAR SPINE
LOCATION DETAILED: EPIGASTRIC SKIN
LOCATION DETAILED: LEFT SUPERIOR HELIX
LOCATION DETAILED: RIGHT INFERIOR MEDIAL UPPER BACK
LOCATION DETAILED: LEFT LATERAL UPPER BACK
LOCATION DETAILED: RIGHT CENTRAL FRONTAL SCALP
LOCATION DETAILED: RIGHT VENTRAL DISTAL FOREARM
LOCATION DETAILED: RIGHT ANTERIOR DISTAL UPPER ARM
LOCATION DETAILED: LEFT DISTAL CALF
LOCATION DETAILED: LEFT ANTERIOR DISTAL THIGH
LOCATION DETAILED: LEFT SUPERIOR PARIETAL SCALP
LOCATION DETAILED: PERIUMBILICAL SKIN
LOCATION DETAILED: RIGHT MID-UPPER BACK
LOCATION DETAILED: LEFT PROXIMAL DORSAL FOREARM
LOCATION DETAILED: RIGHT ANTERIOR DISTAL THIGH
LOCATION DETAILED: LEFT PROXIMAL CALF
LOCATION DETAILED: RIGHT ULNAR DORSAL HAND
LOCATION DETAILED: LEFT PROXIMAL POSTERIOR UPPER ARM
LOCATION DETAILED: LEFT VENTRAL DISTAL FOREARM
LOCATION DETAILED: RIGHT PROXIMAL CALF
LOCATION DETAILED: RIGHT PROXIMAL DORSAL FOREARM
LOCATION DETAILED: LEFT RADIAL DORSAL HAND
LOCATION DETAILED: RIGHT CENTRAL MALAR CHEEK

## 2024-08-21 ASSESSMENT — LOCATION ZONE DERM
LOCATION ZONE: EAR
LOCATION ZONE: TRUNK
LOCATION ZONE: ARM
LOCATION ZONE: LEG
LOCATION ZONE: SCALP
LOCATION ZONE: HAND
LOCATION ZONE: FACE

## 2024-10-28 DIAGNOSIS — E78.2 MIXED HYPERLIPIDEMIA WITH APOLIPOPROTEIN E3 VARIANT: ICD-10-CM

## 2024-10-28 RX ORDER — EZETIMIBE 10 MG/1
10 TABLET ORAL DAILY
Qty: 90 TABLET | Refills: 1 | OUTPATIENT
Start: 2024-10-28

## 2024-10-28 RX ORDER — ROSUVASTATIN CALCIUM 5 MG/1
5 TABLET, COATED ORAL
Qty: 45 TABLET | Refills: 1 | OUTPATIENT
Start: 2024-10-28

## 2024-10-29 ENCOUNTER — APPOINTMENT (OUTPATIENT)
Dept: INTERNAL MEDICINE | Facility: IMAGING CENTER | Age: 74
End: 2024-10-29
Payer: MEDICARE

## 2024-10-29 DIAGNOSIS — Z00.00 HEALTH CARE MAINTENANCE: ICD-10-CM

## 2024-10-29 DIAGNOSIS — E03.9 ACQUIRED HYPOTHYROIDISM: ICD-10-CM

## 2024-10-29 DIAGNOSIS — R74.8 ELEVATED CREATINE KINASE LEVEL: ICD-10-CM

## 2024-10-29 DIAGNOSIS — E55.9 VITAMIN D DEFICIENCY: ICD-10-CM

## 2024-10-29 DIAGNOSIS — Z12.5 SCREENING FOR PROSTATE CANCER: ICD-10-CM

## 2024-10-29 DIAGNOSIS — R73.9 HYPERGLYCEMIA: ICD-10-CM

## 2024-10-29 DIAGNOSIS — E78.2 MIXED HYPERLIPIDEMIA WITH APOLIPOPROTEIN E3 VARIANT: ICD-10-CM

## 2024-10-30 ENCOUNTER — NON-PROVIDER VISIT (OUTPATIENT)
Dept: INTERNAL MEDICINE | Facility: IMAGING CENTER | Age: 74
End: 2024-10-30
Payer: MEDICARE

## 2024-10-30 ENCOUNTER — HOSPITAL ENCOUNTER (OUTPATIENT)
Facility: MEDICAL CENTER | Age: 74
End: 2024-10-30
Attending: FAMILY MEDICINE
Payer: MEDICARE

## 2024-10-30 DIAGNOSIS — Z12.5 SCREENING FOR PROSTATE CANCER: ICD-10-CM

## 2024-10-30 DIAGNOSIS — R74.8 ELEVATED CREATINE KINASE LEVEL: ICD-10-CM

## 2024-10-30 DIAGNOSIS — R73.9 HYPERGLYCEMIA: ICD-10-CM

## 2024-10-30 DIAGNOSIS — E55.9 VITAMIN D DEFICIENCY: ICD-10-CM

## 2024-10-30 DIAGNOSIS — Z00.00 HEALTH CARE MAINTENANCE: ICD-10-CM

## 2024-10-30 DIAGNOSIS — E03.9 ACQUIRED HYPOTHYROIDISM: ICD-10-CM

## 2024-10-30 DIAGNOSIS — E78.2 MIXED HYPERLIPIDEMIA WITH APOLIPOPROTEIN E3 VARIANT: ICD-10-CM

## 2024-10-30 LAB
25(OH)D3 SERPL-MCNC: 57 NG/ML (ref 30–100)
ALBUMIN SERPL BCP-MCNC: 4.3 G/DL (ref 3.2–4.9)
ALBUMIN/GLOB SERPL: 1.4 G/DL
ALP SERPL-CCNC: 59 U/L (ref 30–99)
ALT SERPL-CCNC: 18 U/L (ref 2–50)
ANION GAP SERPL CALC-SCNC: 11 MMOL/L (ref 7–16)
AST SERPL-CCNC: 25 U/L (ref 12–45)
BASOPHILS # BLD AUTO: 0.7 % (ref 0–1.8)
BASOPHILS # BLD: 0.04 K/UL (ref 0–0.12)
BILIRUB SERPL-MCNC: 1.3 MG/DL (ref 0.1–1.5)
BUN SERPL-MCNC: 18 MG/DL (ref 8–22)
CALCIUM ALBUM COR SERPL-MCNC: 9.3 MG/DL (ref 8.5–10.5)
CALCIUM SERPL-MCNC: 9.5 MG/DL (ref 8.5–10.5)
CHLORIDE SERPL-SCNC: 98 MMOL/L (ref 96–112)
CHOLEST SERPL-MCNC: 139 MG/DL (ref 100–199)
CK SERPL-CCNC: 142 U/L (ref 0–154)
CO2 SERPL-SCNC: 26 MMOL/L (ref 20–33)
CREAT SERPL-MCNC: 0.97 MG/DL (ref 0.5–1.4)
EOSINOPHIL # BLD AUTO: 0.17 K/UL (ref 0–0.51)
EOSINOPHIL NFR BLD: 2.8 % (ref 0–6.9)
ERYTHROCYTE [DISTWIDTH] IN BLOOD BY AUTOMATED COUNT: 45 FL (ref 35.9–50)
EST. AVERAGE GLUCOSE BLD GHB EST-MCNC: 131 MG/DL
GFR SERPLBLD CREATININE-BSD FMLA CKD-EPI: 83 ML/MIN/1.73 M 2
GLOBULIN SER CALC-MCNC: 3 G/DL (ref 1.9–3.5)
GLUCOSE SERPL-MCNC: 96 MG/DL (ref 65–99)
HBA1C MFR BLD: 6.2 % (ref 4–5.6)
HCT VFR BLD AUTO: 47.1 % (ref 42–52)
HDLC SERPL-MCNC: 42 MG/DL
HGB BLD-MCNC: 15.9 G/DL (ref 14–18)
IMM GRANULOCYTES # BLD AUTO: 0.01 K/UL (ref 0–0.11)
IMM GRANULOCYTES NFR BLD AUTO: 0.2 % (ref 0–0.9)
LDLC SERPL CALC-MCNC: 81 MG/DL
LYMPHOCYTES # BLD AUTO: 1.6 K/UL (ref 1–4.8)
LYMPHOCYTES NFR BLD: 26.4 % (ref 22–41)
MCH RBC QN AUTO: 33 PG (ref 27–33)
MCHC RBC AUTO-ENTMCNC: 33.8 G/DL (ref 32.3–36.5)
MCV RBC AUTO: 97.7 FL (ref 81.4–97.8)
MONOCYTES # BLD AUTO: 0.54 K/UL (ref 0–0.85)
MONOCYTES NFR BLD AUTO: 8.9 % (ref 0–13.4)
NEUTROPHILS # BLD AUTO: 3.7 K/UL (ref 1.82–7.42)
NEUTROPHILS NFR BLD: 61 % (ref 44–72)
NRBC # BLD AUTO: 0 K/UL
NRBC BLD-RTO: 0 /100 WBC (ref 0–0.2)
PLATELET # BLD AUTO: 209 K/UL (ref 164–446)
PMV BLD AUTO: 10.9 FL (ref 9–12.9)
POTASSIUM SERPL-SCNC: 4.6 MMOL/L (ref 3.6–5.5)
PROT SERPL-MCNC: 7.3 G/DL (ref 6–8.2)
PSA SERPL-MCNC: 2.2 NG/ML (ref 0–4)
RBC # BLD AUTO: 4.82 M/UL (ref 4.7–6.1)
SODIUM SERPL-SCNC: 135 MMOL/L (ref 135–145)
T4 FREE SERPL-MCNC: 1.48 NG/DL (ref 0.93–1.7)
TRIGL SERPL-MCNC: 79 MG/DL (ref 0–149)
TSH SERPL-ACNC: 3.45 UIU/ML (ref 0.35–5.5)
WBC # BLD AUTO: 6.1 K/UL (ref 4.8–10.8)

## 2024-10-30 PROCEDURE — 84153 ASSAY OF PSA TOTAL: CPT

## 2024-10-30 PROCEDURE — 82306 VITAMIN D 25 HYDROXY: CPT

## 2024-10-30 PROCEDURE — 80053 COMPREHEN METABOLIC PANEL: CPT

## 2024-10-30 PROCEDURE — 85025 COMPLETE CBC W/AUTO DIFF WBC: CPT

## 2024-10-30 PROCEDURE — 82550 ASSAY OF CK (CPK): CPT

## 2024-10-30 PROCEDURE — 83036 HEMOGLOBIN GLYCOSYLATED A1C: CPT

## 2024-10-30 PROCEDURE — 80061 LIPID PANEL: CPT

## 2024-10-30 PROCEDURE — 84439 ASSAY OF FREE THYROXINE: CPT

## 2024-10-30 PROCEDURE — 84443 ASSAY THYROID STIM HORMONE: CPT

## 2024-10-31 DIAGNOSIS — E78.2 MIXED HYPERLIPIDEMIA WITH APOLIPOPROTEIN E3 VARIANT: ICD-10-CM

## 2024-10-31 RX ORDER — ROSUVASTATIN CALCIUM 5 MG/1
5 TABLET, COATED ORAL
Qty: 45 TABLET | Refills: 3 | Status: SHIPPED | OUTPATIENT
Start: 2024-10-31

## 2024-10-31 RX ORDER — EZETIMIBE 10 MG/1
10 TABLET ORAL DAILY
Qty: 90 TABLET | Refills: 3 | Status: SHIPPED | OUTPATIENT
Start: 2024-10-31

## 2024-11-14 ENCOUNTER — OFFICE VISIT (OUTPATIENT)
Dept: INTERNAL MEDICINE | Facility: IMAGING CENTER | Age: 74
End: 2024-11-14
Payer: MEDICARE

## 2024-11-14 VITALS
BODY MASS INDEX: 27.48 KG/M2 | HEIGHT: 75 IN | HEART RATE: 56 BPM | RESPIRATION RATE: 17 BRPM | SYSTOLIC BLOOD PRESSURE: 118 MMHG | OXYGEN SATURATION: 95 % | TEMPERATURE: 98 F | DIASTOLIC BLOOD PRESSURE: 64 MMHG | WEIGHT: 221 LBS

## 2024-11-14 DIAGNOSIS — M79.645 CHRONIC PAIN OF LEFT THUMB: ICD-10-CM

## 2024-11-14 DIAGNOSIS — Z00.00 ENCOUNTER FOR MEDICARE ANNUAL WELLNESS EXAM: ICD-10-CM

## 2024-11-14 DIAGNOSIS — R73.09 ELEVATED HEMOGLOBIN A1C: ICD-10-CM

## 2024-11-14 DIAGNOSIS — N13.8 BPH WITH OBSTRUCTION/LOWER URINARY TRACT SYMPTOMS: ICD-10-CM

## 2024-11-14 DIAGNOSIS — I73.9 PAD (PERIPHERAL ARTERY DISEASE) (HCC): ICD-10-CM

## 2024-11-14 DIAGNOSIS — M72.0 DUPUYTREN'S CONTRACTURE OF LEFT HAND: ICD-10-CM

## 2024-11-14 DIAGNOSIS — L72.9 CYST OF BUTTOCKS: ICD-10-CM

## 2024-11-14 DIAGNOSIS — J30.9 ALLERGIC RHINITIS, UNSPECIFIED SEASONALITY, UNSPECIFIED TRIGGER: ICD-10-CM

## 2024-11-14 DIAGNOSIS — I65.21 ATHEROSCLEROSIS OF RIGHT CAROTID ARTERY: ICD-10-CM

## 2024-11-14 DIAGNOSIS — N40.1 BPH WITH OBSTRUCTION/LOWER URINARY TRACT SYMPTOMS: ICD-10-CM

## 2024-11-14 DIAGNOSIS — E03.9 ACQUIRED HYPOTHYROIDISM: ICD-10-CM

## 2024-11-14 DIAGNOSIS — R33.9 BLADDER RETENTION OF URINE: ICD-10-CM

## 2024-11-14 DIAGNOSIS — G89.29 CHRONIC PAIN OF LEFT THUMB: ICD-10-CM

## 2024-11-14 DIAGNOSIS — Z78.9 SELF-CATHETERIZES URINARY BLADDER: ICD-10-CM

## 2024-11-14 DIAGNOSIS — I70.0 ATHEROSCLEROSIS OF ABDOMINAL AORTA (HCC): ICD-10-CM

## 2024-11-14 DIAGNOSIS — E78.2 MIXED HYPERLIPIDEMIA WITH APOLIPOPROTEIN E3 VARIANT: ICD-10-CM

## 2024-11-14 DIAGNOSIS — Z78.9 STATIN INTOLERANCE: ICD-10-CM

## 2024-11-14 DIAGNOSIS — E55.9 VITAMIN D DEFICIENCY: ICD-10-CM

## 2024-11-14 PROBLEM — R93.89 ABNORMAL CAROTID ULTRASOUND: Status: RESOLVED | Noted: 2018-05-29 | Resolved: 2024-11-14

## 2024-11-14 PROBLEM — R74.8 ELEVATED CREATINE KINASE LEVEL: Status: RESOLVED | Noted: 2018-05-29 | Resolved: 2024-11-14

## 2024-11-14 PROCEDURE — 3078F DIAST BP <80 MM HG: CPT | Performed by: FAMILY MEDICINE

## 2024-11-14 PROCEDURE — G0439 PPPS, SUBSEQ VISIT: HCPCS | Performed by: FAMILY MEDICINE

## 2024-11-14 PROCEDURE — 3074F SYST BP LT 130 MM HG: CPT | Performed by: FAMILY MEDICINE

## 2024-11-14 ASSESSMENT — ACTIVITIES OF DAILY LIVING (ADL): BATHING_REQUIRES_ASSISTANCE: 0

## 2024-11-14 ASSESSMENT — ENCOUNTER SYMPTOMS: GENERAL WELL-BEING: EXCELLENT

## 2024-11-14 ASSESSMENT — FIBROSIS 4 INDEX: FIB4 SCORE: 2.03

## 2024-11-14 ASSESSMENT — PATIENT HEALTH QUESTIONNAIRE - PHQ9: CLINICAL INTERPRETATION OF PHQ2 SCORE: 0
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
## 2024-12-04 DIAGNOSIS — E03.9 HYPOTHYROIDISM (ACQUIRED): ICD-10-CM

## 2024-12-04 RX ORDER — LEVOTHYROXINE SODIUM 75 UG/1
75 TABLET ORAL
Qty: 90 TABLET | Refills: 3 | Status: SHIPPED | OUTPATIENT
Start: 2024-12-04

## 2024-12-09 PROBLEM — M19.032 OSTEOARTHRITIS OF LEFT WRIST: Status: ACTIVE | Noted: 2024-12-09

## 2024-12-09 PROBLEM — M18.12 PRIMARY OSTEOARTHRITIS OF FIRST CARPOMETACARPAL JOINT OF LEFT HAND: Status: ACTIVE | Noted: 2024-12-09

## 2025-01-22 ENCOUNTER — NON-PROVIDER VISIT (OUTPATIENT)
Dept: INTERNAL MEDICINE | Facility: IMAGING CENTER | Age: 75
End: 2025-01-22
Payer: MEDICARE

## 2025-01-22 ENCOUNTER — HOSPITAL ENCOUNTER (OUTPATIENT)
Facility: MEDICAL CENTER | Age: 75
End: 2025-01-22
Attending: FAMILY MEDICINE
Payer: MEDICARE

## 2025-01-22 DIAGNOSIS — R30.0 DYSURIA: ICD-10-CM

## 2025-01-22 DIAGNOSIS — R82.90 ABNORMAL URINALYSIS: ICD-10-CM

## 2025-01-22 LAB
APPEARANCE UR: CLEAR
BILIRUB UR STRIP-MCNC: NEGATIVE MG/DL
COLOR UR AUTO: YELLOW
GLUCOSE UR STRIP.AUTO-MCNC: NEGATIVE MG/DL
KETONES UR STRIP.AUTO-MCNC: NEGATIVE MG/DL
LEUKOCYTE ESTERASE UR QL STRIP.AUTO: NORMAL
NITRITE UR QL STRIP.AUTO: NEGATIVE
PH UR STRIP.AUTO: 6 [PH] (ref 5–8)
PROT UR QL STRIP: NEGATIVE MG/DL
RBC UR QL AUTO: NORMAL
SP GR UR STRIP.AUTO: 1.02
UROBILINOGEN UR STRIP-MCNC: 0.2 MG/DL

## 2025-01-22 PROCEDURE — 81002 URINALYSIS NONAUTO W/O SCOPE: CPT | Performed by: FAMILY MEDICINE

## 2025-01-22 PROCEDURE — 87086 URINE CULTURE/COLONY COUNT: CPT

## 2025-01-22 PROCEDURE — 87077 CULTURE AEROBIC IDENTIFY: CPT

## 2025-01-22 PROCEDURE — 87186 SC STD MICRODIL/AGAR DIL: CPT

## 2025-01-24 DIAGNOSIS — N30.00 ACUTE CYSTITIS WITHOUT HEMATURIA: ICD-10-CM

## 2025-01-24 RX ORDER — CEFDINIR 300 MG/1
300 CAPSULE ORAL 2 TIMES DAILY
Qty: 14 CAPSULE | Refills: 0 | Status: SHIPPED | OUTPATIENT
Start: 2025-01-24 | End: 2025-01-31

## 2025-02-04 ENCOUNTER — HOSPITAL ENCOUNTER (OUTPATIENT)
Facility: MEDICAL CENTER | Age: 75
End: 2025-02-05
Attending: EMERGENCY MEDICINE | Admitting: INTERNAL MEDICINE
Payer: MEDICARE

## 2025-02-04 ENCOUNTER — APPOINTMENT (OUTPATIENT)
Dept: RADIOLOGY | Facility: MEDICAL CENTER | Age: 75
End: 2025-02-04
Attending: EMERGENCY MEDICINE
Payer: MEDICARE

## 2025-02-04 ENCOUNTER — APPOINTMENT (OUTPATIENT)
Dept: CARDIOLOGY | Facility: MEDICAL CENTER | Age: 75
End: 2025-02-04
Attending: INTERNAL MEDICINE
Payer: MEDICARE

## 2025-02-04 DIAGNOSIS — R79.89 TROPONIN LEVEL ELEVATED: ICD-10-CM

## 2025-02-04 DIAGNOSIS — R07.9 ACUTE CHEST PAIN: ICD-10-CM

## 2025-02-04 DIAGNOSIS — R07.89 OTHER CHEST PAIN: ICD-10-CM

## 2025-02-04 DIAGNOSIS — I20.0 UNSTABLE ANGINA PECTORIS (HCC): ICD-10-CM

## 2025-02-04 PROBLEM — I24.89 DEMAND ISCHEMIA OF MYOCARDIUM (HCC): Status: ACTIVE | Noted: 2018-06-29

## 2025-02-04 PROBLEM — S22.39XA RIB FRACTURE: Status: ACTIVE | Noted: 2025-02-04

## 2025-02-04 PROBLEM — R91.1 LEFT LOWER LOBE PULMONARY NODULE: Status: ACTIVE | Noted: 2025-02-04

## 2025-02-04 PROBLEM — I49.49 EXTRASYSTOLE: Status: ACTIVE | Noted: 2025-02-04

## 2025-02-04 PROBLEM — R74.01 ELEVATED AST (SGOT): Status: ACTIVE | Noted: 2025-02-04

## 2025-02-04 LAB
ALBUMIN SERPL BCP-MCNC: 3.9 G/DL (ref 3.2–4.9)
ALBUMIN/GLOB SERPL: 1.3 G/DL
ALP SERPL-CCNC: 63 U/L (ref 30–99)
ALT SERPL-CCNC: 30 U/L (ref 2–50)
ANION GAP SERPL CALC-SCNC: 10 MMOL/L (ref 7–16)
AST SERPL-CCNC: 56 U/L (ref 12–45)
BASOPHILS # BLD AUTO: 0.6 % (ref 0–1.8)
BASOPHILS # BLD: 0.03 K/UL (ref 0–0.12)
BILIRUB SERPL-MCNC: 1.1 MG/DL (ref 0.1–1.5)
BUN SERPL-MCNC: 17 MG/DL (ref 8–22)
CALCIUM ALBUM COR SERPL-MCNC: 8.7 MG/DL (ref 8.5–10.5)
CALCIUM SERPL-MCNC: 8.6 MG/DL (ref 8.4–10.2)
CHLORIDE SERPL-SCNC: 101 MMOL/L (ref 96–112)
CHOLEST SERPL-MCNC: 159 MG/DL (ref 100–199)
CO2 SERPL-SCNC: 26 MMOL/L (ref 20–33)
CREAT SERPL-MCNC: 1.03 MG/DL (ref 0.5–1.4)
D DIMER PPP IA.FEU-MCNC: 0.55 UG/ML (FEU) (ref 0–0.5)
EKG IMPRESSION: NORMAL
EKG IMPRESSION: NORMAL
EOSINOPHIL # BLD AUTO: 0.2 K/UL (ref 0–0.51)
EOSINOPHIL NFR BLD: 3.8 % (ref 0–6.9)
ERYTHROCYTE [DISTWIDTH] IN BLOOD BY AUTOMATED COUNT: 44.8 FL (ref 35.9–50)
EST. AVERAGE GLUCOSE BLD GHB EST-MCNC: 114 MG/DL
GFR SERPLBLD CREATININE-BSD FMLA CKD-EPI: 77 ML/MIN/1.73 M 2
GLOBULIN SER CALC-MCNC: 3 G/DL (ref 1.9–3.5)
GLUCOSE SERPL-MCNC: 74 MG/DL (ref 65–99)
HBA1C MFR BLD: 5.6 % (ref 4–5.6)
HCT VFR BLD AUTO: 48 % (ref 42–52)
HDLC SERPL-MCNC: 41 MG/DL
HGB BLD-MCNC: 15.9 G/DL (ref 14–18)
IMM GRANULOCYTES # BLD AUTO: 0.02 K/UL (ref 0–0.11)
IMM GRANULOCYTES NFR BLD AUTO: 0.4 % (ref 0–0.9)
LDLC SERPL CALC-MCNC: 93 MG/DL
LIPASE SERPL-CCNC: 41 U/L (ref 11–82)
LV EJECT FRACT  99904: 55
LV EJECT FRACT MOD 2C 99903: 55.88
LV EJECT FRACT MOD 4C 99902: 51.55
LV EJECT FRACT MOD BP 99901: 51.8
LYMPHOCYTES # BLD AUTO: 1.64 K/UL (ref 1–4.8)
LYMPHOCYTES NFR BLD: 30.8 % (ref 22–41)
MAGNESIUM SERPL-MCNC: 2 MG/DL (ref 1.5–2.5)
MCH RBC QN AUTO: 32 PG (ref 27–33)
MCHC RBC AUTO-ENTMCNC: 33.1 G/DL (ref 32.3–36.5)
MCV RBC AUTO: 96.6 FL (ref 81.4–97.8)
MONOCYTES # BLD AUTO: 0.44 K/UL (ref 0–0.85)
MONOCYTES NFR BLD AUTO: 8.3 % (ref 0–13.4)
NEUTROPHILS # BLD AUTO: 2.99 K/UL (ref 1.82–7.42)
NEUTROPHILS NFR BLD: 56.1 % (ref 44–72)
NRBC # BLD AUTO: 0 K/UL
NRBC BLD-RTO: 0 /100 WBC (ref 0–0.2)
NT-PROBNP SERPL IA-MCNC: 271 PG/ML (ref 0–125)
PLATELET # BLD AUTO: 199 K/UL (ref 164–446)
PMV BLD AUTO: 10.8 FL (ref 9–12.9)
POTASSIUM SERPL-SCNC: 4.1 MMOL/L (ref 3.6–5.5)
PROT SERPL-MCNC: 6.9 G/DL (ref 6–8.2)
RBC # BLD AUTO: 4.97 M/UL (ref 4.7–6.1)
SODIUM SERPL-SCNC: 137 MMOL/L (ref 135–145)
TRIGL SERPL-MCNC: 124 MG/DL (ref 0–149)
TROPONIN T SERPL-MCNC: 19 NG/L (ref 6–19)
TROPONIN T SERPL-MCNC: 21 NG/L (ref 6–19)
TROPONIN T SERPL-MCNC: 24 NG/L (ref 6–19)
WBC # BLD AUTO: 5.3 K/UL (ref 4.8–10.8)

## 2025-02-04 PROCEDURE — 83690 ASSAY OF LIPASE: CPT

## 2025-02-04 PROCEDURE — A9270 NON-COVERED ITEM OR SERVICE: HCPCS | Performed by: EMERGENCY MEDICINE

## 2025-02-04 PROCEDURE — 85025 COMPLETE CBC W/AUTO DIFF WBC: CPT

## 2025-02-04 PROCEDURE — 85379 FIBRIN DEGRADATION QUANT: CPT

## 2025-02-04 PROCEDURE — 700102 HCHG RX REV CODE 250 W/ 637 OVERRIDE(OP): Performed by: EMERGENCY MEDICINE

## 2025-02-04 PROCEDURE — 700101 HCHG RX REV CODE 250: Performed by: HOSPITALIST

## 2025-02-04 PROCEDURE — 71275 CT ANGIOGRAPHY CHEST: CPT

## 2025-02-04 PROCEDURE — 94760 N-INVAS EAR/PLS OXIMETRY 1: CPT

## 2025-02-04 PROCEDURE — A9270 NON-COVERED ITEM OR SERVICE: HCPCS | Performed by: HOSPITALIST

## 2025-02-04 PROCEDURE — 80053 COMPREHEN METABOLIC PANEL: CPT

## 2025-02-04 PROCEDURE — 96372 THER/PROPH/DIAG INJ SC/IM: CPT

## 2025-02-04 PROCEDURE — 36415 COLL VENOUS BLD VENIPUNCTURE: CPT

## 2025-02-04 PROCEDURE — G0378 HOSPITAL OBSERVATION PER HR: HCPCS

## 2025-02-04 PROCEDURE — 80061 LIPID PANEL: CPT

## 2025-02-04 PROCEDURE — 700111 HCHG RX REV CODE 636 W/ 250 OVERRIDE (IP): Mod: JZ | Performed by: INTERNAL MEDICINE

## 2025-02-04 PROCEDURE — 83880 ASSAY OF NATRIURETIC PEPTIDE: CPT

## 2025-02-04 PROCEDURE — 700117 HCHG RX CONTRAST REV CODE 255: Performed by: EMERGENCY MEDICINE

## 2025-02-04 PROCEDURE — 93306 TTE W/DOPPLER COMPLETE: CPT

## 2025-02-04 PROCEDURE — 71045 X-RAY EXAM CHEST 1 VIEW: CPT

## 2025-02-04 PROCEDURE — 83735 ASSAY OF MAGNESIUM: CPT

## 2025-02-04 PROCEDURE — 93306 TTE W/DOPPLER COMPLETE: CPT | Mod: 26 | Performed by: INTERNAL MEDICINE

## 2025-02-04 PROCEDURE — 93005 ELECTROCARDIOGRAM TRACING: CPT | Mod: TC | Performed by: EMERGENCY MEDICINE

## 2025-02-04 PROCEDURE — 84484 ASSAY OF TROPONIN QUANT: CPT | Mod: 91

## 2025-02-04 PROCEDURE — 99285 EMERGENCY DEPT VISIT HI MDM: CPT

## 2025-02-04 PROCEDURE — 83036 HEMOGLOBIN GLYCOSYLATED A1C: CPT

## 2025-02-04 PROCEDURE — 99223 1ST HOSP IP/OBS HIGH 75: CPT | Mod: AI | Performed by: INTERNAL MEDICINE

## 2025-02-04 PROCEDURE — 93005 ELECTROCARDIOGRAM TRACING: CPT | Mod: TC | Performed by: INTERNAL MEDICINE

## 2025-02-04 PROCEDURE — 700102 HCHG RX REV CODE 250 W/ 637 OVERRIDE(OP): Performed by: HOSPITALIST

## 2025-02-04 RX ORDER — ONDANSETRON 2 MG/ML
4 INJECTION INTRAMUSCULAR; INTRAVENOUS EVERY 4 HOURS PRN
Status: DISCONTINUED | OUTPATIENT
Start: 2025-02-04 | End: 2025-02-05 | Stop reason: HOSPADM

## 2025-02-04 RX ORDER — ASPIRIN 81 MG/1
324 TABLET, CHEWABLE ORAL ONCE
Status: COMPLETED | OUTPATIENT
Start: 2025-02-04 | End: 2025-02-04

## 2025-02-04 RX ORDER — AMOXICILLIN 250 MG
2 CAPSULE ORAL EVERY EVENING
Status: DISCONTINUED | OUTPATIENT
Start: 2025-02-04 | End: 2025-02-05 | Stop reason: HOSPADM

## 2025-02-04 RX ORDER — ACETAMINOPHEN 500 MG
1000 TABLET ORAL EVERY 6 HOURS PRN
COMMUNITY

## 2025-02-04 RX ORDER — ASPIRIN 81 MG/1
81 TABLET ORAL DAILY
Status: DISCONTINUED | OUTPATIENT
Start: 2025-02-05 | End: 2025-02-05 | Stop reason: HOSPADM

## 2025-02-04 RX ORDER — REGADENOSON 0.08 MG/ML
0.4 INJECTION, SOLUTION INTRAVENOUS ONCE
Status: ACTIVE | OUTPATIENT
Start: 2025-02-04 | End: 2025-02-05

## 2025-02-04 RX ORDER — ENOXAPARIN SODIUM 100 MG/ML
40 INJECTION SUBCUTANEOUS DAILY
Status: DISCONTINUED | OUTPATIENT
Start: 2025-02-04 | End: 2025-02-05 | Stop reason: HOSPADM

## 2025-02-04 RX ORDER — ONDANSETRON 4 MG/1
4 TABLET, ORALLY DISINTEGRATING ORAL EVERY 4 HOURS PRN
Status: DISCONTINUED | OUTPATIENT
Start: 2025-02-04 | End: 2025-02-05 | Stop reason: HOSPADM

## 2025-02-04 RX ORDER — ACETAMINOPHEN 325 MG/1
650 TABLET ORAL EVERY 6 HOURS PRN
Status: DISCONTINUED | OUTPATIENT
Start: 2025-02-04 | End: 2025-02-05 | Stop reason: HOSPADM

## 2025-02-04 RX ORDER — HYDRALAZINE HYDROCHLORIDE 20 MG/ML
10 INJECTION INTRAMUSCULAR; INTRAVENOUS EVERY 4 HOURS PRN
Status: DISCONTINUED | OUTPATIENT
Start: 2025-02-04 | End: 2025-02-05 | Stop reason: HOSPADM

## 2025-02-04 RX ORDER — MORPHINE SULFATE 4 MG/ML
2 INJECTION INTRAVENOUS
Status: DISCONTINUED | OUTPATIENT
Start: 2025-02-04 | End: 2025-02-04

## 2025-02-04 RX ORDER — POLYETHYLENE GLYCOL 3350 17 G/17G
1 POWDER, FOR SOLUTION ORAL
Status: DISCONTINUED | OUTPATIENT
Start: 2025-02-04 | End: 2025-02-05 | Stop reason: HOSPADM

## 2025-02-04 RX ORDER — LEVOTHYROXINE SODIUM 75 UG/1
75 TABLET ORAL
Status: DISCONTINUED | OUTPATIENT
Start: 2025-02-04 | End: 2025-02-05 | Stop reason: HOSPADM

## 2025-02-04 RX ORDER — NITROGLYCERIN 0.4 MG/1
0.4 TABLET SUBLINGUAL
Status: DISCONTINUED | OUTPATIENT
Start: 2025-02-04 | End: 2025-02-05 | Stop reason: HOSPADM

## 2025-02-04 RX ORDER — EZETIMIBE 10 MG/1
10 TABLET ORAL DAILY
Status: DISCONTINUED | OUTPATIENT
Start: 2025-02-04 | End: 2025-02-05 | Stop reason: HOSPADM

## 2025-02-04 RX ORDER — NITROGLYCERIN 0.4 MG/1
0.4 TABLET SUBLINGUAL ONCE
Status: COMPLETED | OUTPATIENT
Start: 2025-02-04 | End: 2025-02-04

## 2025-02-04 RX ORDER — ROSUVASTATIN CALCIUM 10 MG/1
5 TABLET, COATED ORAL
Status: DISCONTINUED | OUTPATIENT
Start: 2025-02-05 | End: 2025-02-05 | Stop reason: HOSPADM

## 2025-02-04 RX ORDER — AMINOPHYLLINE 25 MG/ML
100 INJECTION, SOLUTION INTRAVENOUS
Status: DISCONTINUED | OUTPATIENT
Start: 2025-02-04 | End: 2025-02-05 | Stop reason: HOSPADM

## 2025-02-04 RX ADMIN — LIDOCAINE HYDROCHLORIDE 15 ML: 20 SOLUTION ORAL; TOPICAL at 23:08

## 2025-02-04 RX ADMIN — IOHEXOL 86 ML: 350 INJECTION, SOLUTION INTRAVENOUS at 08:40

## 2025-02-04 RX ADMIN — NITROGLYCERIN 0.4 MG: 0.4 TABLET, ORALLY DISINTEGRATING SUBLINGUAL at 07:33

## 2025-02-04 RX ADMIN — ENOXAPARIN SODIUM 40 MG: 100 INJECTION SUBCUTANEOUS at 17:19

## 2025-02-04 RX ADMIN — ASPIRIN 324 MG: 81 TABLET, CHEWABLE ORAL at 07:32

## 2025-02-04 RX ADMIN — NITROGLYCERIN 0.4 MG: 0.4 TABLET, ORALLY DISINTEGRATING SUBLINGUAL at 23:53

## 2025-02-04 SDOH — ECONOMIC STABILITY: TRANSPORTATION INSECURITY
IN THE PAST 12 MONTHS, HAS LACK OF RELIABLE TRANSPORTATION KEPT YOU FROM MEDICAL APPOINTMENTS, MEETINGS, WORK OR FROM GETTING THINGS NEEDED FOR DAILY LIVING?: NO

## 2025-02-04 SDOH — ECONOMIC STABILITY: TRANSPORTATION INSECURITY
IN THE PAST 12 MONTHS, HAS THE LACK OF TRANSPORTATION KEPT YOU FROM MEDICAL APPOINTMENTS OR FROM GETTING MEDICATIONS?: NO

## 2025-02-04 ASSESSMENT — COGNITIVE AND FUNCTIONAL STATUS - GENERAL
SUGGESTED CMS G CODE MODIFIER MOBILITY: CH
MOBILITY SCORE: 24
SUGGESTED CMS G CODE MODIFIER DAILY ACTIVITY: CH
DAILY ACTIVITIY SCORE: 24

## 2025-02-04 ASSESSMENT — SOCIAL DETERMINANTS OF HEALTH (SDOH)
WITHIN THE PAST 12 MONTHS, YOU WORRIED THAT YOUR FOOD WOULD RUN OUT BEFORE YOU GOT THE MONEY TO BUY MORE: NEVER TRUE
WITHIN THE PAST 12 MONTHS, THE FOOD YOU BOUGHT JUST DIDN'T LAST AND YOU DIDN'T HAVE MONEY TO GET MORE: NEVER TRUE
WITHIN THE LAST YEAR, HAVE YOU BEEN HUMILIATED OR EMOTIONALLY ABUSED IN OTHER WAYS BY YOUR PARTNER OR EX-PARTNER?: NO
WITHIN THE LAST YEAR, HAVE YOU BEEN KICKED, HIT, SLAPPED, OR OTHERWISE PHYSICALLY HURT BY YOUR PARTNER OR EX-PARTNER?: NO
IN THE PAST 12 MONTHS, HAS THE ELECTRIC, GAS, OIL, OR WATER COMPANY THREATENED TO SHUT OFF SERVICE IN YOUR HOME?: NO
WITHIN THE LAST YEAR, HAVE YOU BEEN AFRAID OF YOUR PARTNER OR EX-PARTNER?: NO
WITHIN THE LAST YEAR, HAVE TO BEEN RAPED OR FORCED TO HAVE ANY KIND OF SEXUAL ACTIVITY BY YOUR PARTNER OR EX-PARTNER?: NO

## 2025-02-04 ASSESSMENT — LIFESTYLE VARIABLES
EVER HAD A DRINK FIRST THING IN THE MORNING TO STEADY YOUR NERVES TO GET RID OF A HANGOVER: NO
ON A TYPICAL DAY WHEN YOU DRINK ALCOHOL HOW MANY DRINKS DO YOU HAVE: 1
TOTAL SCORE: 0
AVERAGE NUMBER OF DAYS PER WEEK YOU HAVE A DRINK CONTAINING ALCOHOL: 3
EVER FELT BAD OR GUILTY ABOUT YOUR DRINKING: NO
TOTAL SCORE: 0
HAVE YOU EVER FELT YOU SHOULD CUT DOWN ON YOUR DRINKING: NO
HAVE PEOPLE ANNOYED YOU BY CRITICIZING YOUR DRINKING: NO
HOW MANY TIMES IN THE PAST YEAR HAVE YOU HAD 5 OR MORE DRINKS IN A DAY: 0
DOES PATIENT WANT TO STOP DRINKING: NO
TOTAL SCORE: 0
CONSUMPTION TOTAL: NEGATIVE
ALCOHOL_USE: YES

## 2025-02-04 ASSESSMENT — HEART SCORE
TROPONIN: 1-3 TIMES NORMAL LIMIT
RISK FACTORS: 1-2 RISK FACTORS
HISTORY: HIGHLY SUSPICIOUS
HEART SCORE: 6
AGE: 65+
ECG: NORMAL

## 2025-02-04 ASSESSMENT — PATIENT HEALTH QUESTIONNAIRE - PHQ9
1. LITTLE INTEREST OR PLEASURE IN DOING THINGS: NOT AT ALL
2. FEELING DOWN, DEPRESSED, IRRITABLE, OR HOPELESS: NOT AT ALL
SUM OF ALL RESPONSES TO PHQ9 QUESTIONS 1 AND 2: 0

## 2025-02-04 ASSESSMENT — FIBROSIS 4 INDEX
FIB4 SCORE: 3.7
FIB4 SCORE: 2.03

## 2025-02-04 ASSESSMENT — PAIN DESCRIPTION - PAIN TYPE
TYPE: ACUTE PAIN
TYPE: ACUTE PAIN

## 2025-02-04 NOTE — ASSESSMENT & PLAN NOTE
Trop 24->21, relatively flat, stable  Chest pain and presentation 15 minutes after sxs onset warrants further monitoring  I will check trop q6hr x 2 to ensure it remains flat  Plan for nuc stress carine AM as patient had caffeine this AM  Echo  [place]po at [place]  Check A1c, lipids  Starting asa  Home statin continued  No bb, pt already bradycardic  Consider GI etiology as well
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
## 2025-02-04 NOTE — ED NOTES
Medication history reviewed with pt. Med rec is complete.  Allergies reviewed, per pt  Interviewed pt with wife at bedside with permission from pt.    Pt reports that he went to [facility] [place] on 2/3/2025 received an injection in right ring finger (COLLAGENASE CLOSTRIDIUM 0.9MG)    Pt started CEFDINIR 300MG on 1/24/2025 for 7 day course, per pt reports that he did finish course of antibiotic     Pt is not on any anticoagulants

## 2025-02-04 NOTE — ED PROVIDER NOTES
ED Provider Note    CHIEF COMPLAINT  Chief Complaint   Patient presents with    Chest Pain     Started about 10 mins PTA, c/o mid chest pain describes as constant tight feeling; denies any SOB, radiation of pain, n/v, no cardiac hx        EXTERNAL RECORDS REVIEWED  Other none    HPI/ROS  LIMITATION TO HISTORY   Select: : None    OUTSIDE HISTORIAN(S):  Significant other pts wife at bedside    Checo Hughes is a 74 y.o. male with h/o DL who presents for evaluation of chest pain.    Patient states he had acute onset of substernal chest pain that is dull/achy while at rest at the computer this morning.  He had similar pain weeks to months ago but this is lasting longer.  He states it is constant, nonradiating, 6 out of 10.  Patient denies associated nausea, vomiting, diaphoresis, shortness of breath.  He denies aggravating and alleviating factors.    Patient denies fever, chills, cough, leg swelling, calf pain.    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    SURGICAL HISTORY   has a past surgical history that includes thoracotomy (2004); turp-vapor (01/2016); and rhinoplasty.    FAMILY HISTORY  Family History   Problem Relation Age of Onset    Heart Disease Father 65        ? valvular heart disease    Heart Disease Brother 65     Mother with A-fib  Father with CHF    SOCIAL HISTORY  Social History     Tobacco Use    Smoking status: Never    Smokeless tobacco: Never   Vaping Use    Vaping status: Never Used   Substance and Sexual Activity    Alcohol use: Yes     Alcohol/week: 0.0 oz     Comment: 1 per day    Drug use: No    Sexual activity: Yes     Partners: Female       CURRENT  MEDICATIONS  Home Medications       Reviewed by Raulito Rainey (Pharmacy Tech) on 02/04/25 at 0845  Med List Status: Complete     Medication Last Dose Status   acetaminophen (TYLENOL) 500 MG Tab 2/3/2025 Active   azelastine (ASTELIN) 137 MCG/SPRAY nasal spray 2/3/2025 Active   b complex vitamins tablet 2/3/2025 Active   Cholecalciferol (D3 PO) 2/3/2025 Active   COENZYME Q10 PO 2/3/2025 Active   ezetimibe (ZETIA) 10 MG Tab 2/3/2025 Active   levothyroxine (SYNTHROID) 75 MCG Tab 2/3/2025 Active   rosuvastatin (CRESTOR) 5 MG Tab 2/3/2025 Active   tadalafil (CIALIS) 5 MG tablet 1/28/2025 Active                  Audit from Redirected Encounters    **Home medications have not yet been reviewed for this encounter**         ALLERGIES  No Known Allergies    PHYSICAL EXAM  VITAL SIGNS: /62   Pulse 64   Resp (!) 24   Wt 99.8 kg (220 lb)   SpO2 93%   BMI 27.50 kg/m²    General:  WDWN male, nontoxic appearing in NAD; A+Ox3; V/S as above; joie, elevated BP  Skin: warm and dry; good color; no rash  HEENT: NCAT; EOMs intact; PERRL; no scleral icterus   Neck: FROM  Cardiovascular: Regular heart rate and rhythm.  No murmurs, rubs, or gallops; pulses 2+ bilaterally radially; no chest wall tenderness or crepitus  Lungs: No respiratory distress or tachypnea; Clear to auscultation with good air movement bilaterally.  No wheezes, rhonchi, or rales.   Abdomen: BS present; soft; NTND; no rebound, guarding, or rigidity.  No organomegaly or pulsatile mass  Extremities: GUSMAN x 4; no e/o trauma; no pedal edema; neg Jessica's  Neurologic: CNs III-XII grossly intact; speech clear  Psychiatric: Appropriate affect, normal mood      EKG/LABS  Results for orders placed or performed during the hospital encounter of 02/04/25   CBC with Differential    Collection Time: 02/04/25  7:06 AM   Result Value Ref Range    WBC 5.3 4.8 - 10.8 K/uL    RBC 4.97 4.70 - 6.10 M/uL    Hemoglobin 15.9 14.0 - 18.0 g/dL    Hematocrit 48.0 42.0 - 52.0 %     MCV 96.6 81.4 - 97.8 fL    MCH 32.0 27.0 - 33.0 pg    MCHC 33.1 32.3 - 36.5 g/dL    RDW 44.8 35.9 - 50.0 fL    Platelet Count 199 164 - 446 K/uL    MPV 10.8 9.0 - 12.9 fL    Neutrophils-Polys 56.10 44.00 - 72.00 %    Lymphocytes 30.80 22.00 - 41.00 %    Monocytes 8.30 0.00 - 13.40 %    Eosinophils 3.80 0.00 - 6.90 %    Basophils 0.60 0.00 - 1.80 %    Immature Granulocytes 0.40 0.00 - 0.90 %    Nucleated RBC 0.00 0.00 - 0.20 /100 WBC    Neutrophils (Absolute) 2.99 1.82 - 7.42 K/uL    Lymphs (Absolute) 1.64 1.00 - 4.80 K/uL    Monos (Absolute) 0.44 0.00 - 0.85 K/uL    Eos (Absolute) 0.20 0.00 - 0.51 K/uL    Baso (Absolute) 0.03 0.00 - 0.12 K/uL    Immature Granulocytes (abs) 0.02 0.00 - 0.11 K/uL    NRBC (Absolute) 0.00 K/uL   Complete Metabolic Panel (CMP)    Collection Time: 02/04/25  7:06 AM   Result Value Ref Range    Sodium 137 135 - 145 mmol/L    Potassium 4.1 3.6 - 5.5 mmol/L    Chloride 101 96 - 112 mmol/L    Co2 26 20 - 33 mmol/L    Anion Gap 10.0 7.0 - 16.0    Glucose 74 65 - 99 mg/dL    Bun 17 8 - 22 mg/dL    Creatinine 1.03 0.50 - 1.40 mg/dL    Calcium 8.6 8.4 - 10.2 mg/dL    Correct Calcium 8.7 8.5 - 10.5 mg/dL    AST(SGOT) 56 (H) 12 - 45 U/L    ALT(SGPT) 30 2 - 50 U/L    Alkaline Phosphatase 63 30 - 99 U/L    Total Bilirubin 1.1 0.1 - 1.5 mg/dL    Albumin 3.9 3.2 - 4.9 g/dL    Total Protein 6.9 6.0 - 8.2 g/dL    Globulin 3.0 1.9 - 3.5 g/dL    A-G Ratio 1.3 g/dL   proBrain Natriuretic Peptide, NT (BNP)    Collection Time: 02/04/25  7:06 AM   Result Value Ref Range    NT-proBNP 271 (H) 0 - 125 pg/mL   Troponins NOW    Collection Time: 02/04/25  7:06 AM   Result Value Ref Range    Troponin T 24 (H) 6 - 19 ng/L   LIPASE    Collection Time: 02/04/25  7:06 AM   Result Value Ref Range    Lipase 41 11 - 82 U/L   D-DIMER    Collection Time: 02/04/25  7:06 AM   Result Value Ref Range    D-Dimer 0.55 (H) 0.00 - 0.50 ug/mL (FEU)   ESTIMATED GFR    Collection Time: 02/04/25  7:06 AM   Result Value Ref Range    GFR  (CKD-EPI) 77 >60 mL/min/1.73 m 2   EKG    Collection Time: 25  7:29 AM   Result Value Ref Range    Report       [facility]    Test Date:  2025  Pt Name:    VIKY BIRMINGHAM           Department: [facility]  MRN:        [id number]                      Room:       [facility] 3  Gender:     Male                         Technician: [id number]  :        1950                   Requested By:ER TRIAGE PROTOCOL  Order #:    [id number]                    Reading MD: KIMBERLY SUTTON MD    Measurements  Intervals                                Axis  Rate:       55                           P:          0  IA:         280                          QRS:        -41  QRSD:       107                          T:          26  QT:         441  QTc:        422    Interpretive Statements  Sinus rhythm  Atrial premature complex  Prolonged IA interval  Inferior infarct, old  Baseline wander in lead(s) V3  No previous ECG available for comparison  Electronically Signed On 2025 07:29:28 PST by KIMBERLY SUTTON MD     EKG    Collection Time: 25  8:09 AM   Result Value Ref Range    Report       [facility]    Test Date:  2025  Pt Name:    VIKY BIRMINGHAM           Department: [facility]  MRN:        [id number]                      Room:       [facility] 3  Gender:     Male                         Technician: [id number]  :        1950                   Requested By:KIMBERLY SUTTON  Order #:    [id number]                    Reading MD: KIMBERLY SUTTON MD    Measurements  Intervals                                Axis  Rate:       63                           P:          -72  IA:         268                          QRS:        -46  QRSD:       106                          T:          21  QT:         442  QTc:        453    Interpretive Statements  Sinus or ectopic atrial rhythm  Prolonged IA interval  Inferior infarct, old  Compared to ECG  02/04/2025 07:13:34  Ectopic atrial rhythm now present  Sinus rhythm no longer present  Atrial premature complex(es) no longer present  Myocardial infarct finding still present  Electronically Signed  On 02- 08:09:39 PST by KIMBERLY SUTTON MD     Troponins in two (2) hours    Collection Time: 02/04/25  9:00 AM   Result Value Ref Range    Troponin T 21 (H) 6 - 19 ng/L     *Note: Due to a large number of results and/or encounters for the requested time period, some results have not been displayed. A complete set of results can be found in Results Review.       I have independently interpreted this EKG which shows no acute ST changes    RADIOLOGY/PROCEDURES   I have independently interpreted the diagnostic imaging associated with this visit and am waiting the final reading from the radiologist.   My preliminary interpretation is as follows:   Chest x-ray shows no focal consolidation, widened mediastinum, or pleural effusion    Radiologist interpretation:  CT-CTA CHEST PULMONARY ARTERY W/ RECONS   Final Result         1. There is a small area of nodular infiltrate in the left lower lobe.   2. Chronic left chest wall deformity.   3. Atherosclerosis with dilatation of the ascending aorta.   4. No evidence for pulmonary embolism.            DX-CHEST-PORTABLE (1 VIEW)   Final Result         1.  No acute cardiopulmonary disease.   2.  Left rib fractures          COURSE & MEDICAL DECISION MAKING    ASSESSMENT, COURSE AND PLAN  Care Narrative: This is a 74-year-old male with history of dyslipidemia who presents with chest pain starting at rest this morning 10 to 15 minutes prior to arrival.  This was ongoing until he received nitroglycerin here in the ER.  Patient was noted to be hypertensive and bradycardic.  Serial EKGs showed no acute ST changes.  Chest x-ray showed no acute pathology.  I suspect ACS but also considered TAD, PE, esophageal spasm, gastritis.    Aspirin and nitroglycerin administered  here.    Reevaluation, the patient states his chest pain is resolved.    Troponin slightly elevated at 24.  BNP elevated at 271.  Dimer elevated 0.55    CTA PE study ordered for elevated D-dimer    8:10 AM  Paging hospitalist for admission for ACS    9:18 AM  CTA PE negative for PE    9:33 AM  Troponin flat    9:37 AM  I discussed the case with Dr. Troy who agrees to hospitalize the patient.  Aware of second troponin and CT results.    CHEST PAIN:   HEART Score for Major Cardiac Events  HEART Score     History: Highly suspicious  ECG: Normal  Age: 65+  Risk Factors: 1-2 risk factors  Troponin: 1-3 times normal limit    Heart Score: 6    Total Score   0-3 Points = Low Score, risk of MACE 0.9-1.7%.  4-6 Points = Moderate Score, risk of MACE 12-16.6%  7-10 Points = High Score, risk of MACE 50-65%          ADDITIONAL PROBLEMS MANAGED  none    DISPOSITION AND DISCUSSIONS  I have discussed management of the patient with the following physicians and PARTH's:    hospitalist      FINAL DIAGNOSIS  1. Acute chest pain    2. Troponin level elevated         Electronically signed by: Elba Torres M.D., 2/4/2025 7:03 AM

## 2025-02-04 NOTE — ED TRIAGE NOTES
Chief Complaint   Patient presents with    Chest Pain     Started about 10 mins PTA, c/o mid chest pain describes as constant tight feeling; denies any SOB, radiation of pain, n/v, no cardiac hx      BP (!) 185/84   Pulse (!) 53   Resp (!) 22   SpO2 95%     Pt arrived w/ above concern, EKG done in ED room

## 2025-02-05 ENCOUNTER — APPOINTMENT (OUTPATIENT)
Dept: RADIOLOGY | Facility: MEDICAL CENTER | Age: 75
End: 2025-02-05
Attending: INTERNAL MEDICINE
Payer: MEDICARE

## 2025-02-05 VITALS
WEIGHT: 227.07 LBS | HEIGHT: 75 IN | DIASTOLIC BLOOD PRESSURE: 58 MMHG | OXYGEN SATURATION: 98 % | RESPIRATION RATE: 18 BRPM | HEART RATE: 61 BPM | TEMPERATURE: 97.4 F | SYSTOLIC BLOOD PRESSURE: 136 MMHG | BODY MASS INDEX: 28.23 KG/M2

## 2025-02-05 LAB
ANION GAP SERPL CALC-SCNC: 11 MMOL/L (ref 7–16)
BUN SERPL-MCNC: 17 MG/DL (ref 8–22)
CALCIUM SERPL-MCNC: 8.6 MG/DL (ref 8.4–10.2)
CHLORIDE SERPL-SCNC: 102 MMOL/L (ref 96–112)
CO2 SERPL-SCNC: 24 MMOL/L (ref 20–33)
CREAT SERPL-MCNC: 1.05 MG/DL (ref 0.5–1.4)
EKG IMPRESSION: NORMAL
ERYTHROCYTE [DISTWIDTH] IN BLOOD BY AUTOMATED COUNT: 44.8 FL (ref 35.9–50)
GFR SERPLBLD CREATININE-BSD FMLA CKD-EPI: 75 ML/MIN/1.73 M 2
GLUCOSE SERPL-MCNC: 110 MG/DL (ref 65–99)
HCT VFR BLD AUTO: 46.3 % (ref 42–52)
HGB BLD-MCNC: 15.4 G/DL (ref 14–18)
MCH RBC QN AUTO: 32.1 PG (ref 27–33)
MCHC RBC AUTO-ENTMCNC: 33.3 G/DL (ref 32.3–36.5)
MCV RBC AUTO: 96.5 FL (ref 81.4–97.8)
PLATELET # BLD AUTO: 182 K/UL (ref 164–446)
PMV BLD AUTO: 10.5 FL (ref 9–12.9)
POTASSIUM SERPL-SCNC: 4.2 MMOL/L (ref 3.6–5.5)
RBC # BLD AUTO: 4.8 M/UL (ref 4.7–6.1)
SODIUM SERPL-SCNC: 137 MMOL/L (ref 135–145)
TROPONIN T SERPL-MCNC: 21 NG/L (ref 6–19)
WBC # BLD AUTO: 7.1 K/UL (ref 4.8–10.8)

## 2025-02-05 PROCEDURE — 700102 HCHG RX REV CODE 250 W/ 637 OVERRIDE(OP): Performed by: INTERNAL MEDICINE

## 2025-02-05 PROCEDURE — 84484 ASSAY OF TROPONIN QUANT: CPT

## 2025-02-05 PROCEDURE — 94760 N-INVAS EAR/PLS OXIMETRY 1: CPT

## 2025-02-05 PROCEDURE — 36415 COLL VENOUS BLD VENIPUNCTURE: CPT

## 2025-02-05 PROCEDURE — 85027 COMPLETE CBC AUTOMATED: CPT

## 2025-02-05 PROCEDURE — 99239 HOSP IP/OBS DSCHRG MGMT >30: CPT | Performed by: INTERNAL MEDICINE

## 2025-02-05 PROCEDURE — G0378 HOSPITAL OBSERVATION PER HR: HCPCS

## 2025-02-05 PROCEDURE — 80048 BASIC METABOLIC PNL TOTAL CA: CPT

## 2025-02-05 PROCEDURE — 93010 ELECTROCARDIOGRAM REPORT: CPT | Performed by: INTERNAL MEDICINE

## 2025-02-05 PROCEDURE — A9270 NON-COVERED ITEM OR SERVICE: HCPCS | Performed by: INTERNAL MEDICINE

## 2025-02-05 RX ORDER — ASPIRIN 81 MG/1
81 TABLET ORAL DAILY
Qty: 100 TABLET | Refills: 0 | Status: SHIPPED | OUTPATIENT
Start: 2025-02-06

## 2025-02-05 RX ORDER — OMEPRAZOLE 20 MG/1
20 CAPSULE, DELAYED RELEASE ORAL 2 TIMES DAILY
Qty: 28 CAPSULE | Refills: 0 | Status: SHIPPED | OUTPATIENT
Start: 2025-02-05 | End: 2025-02-19

## 2025-02-05 RX ORDER — NITROGLYCERIN 0.4 MG/1
0.4 TABLET SUBLINGUAL PRN
Qty: 25 TABLET | Refills: 0 | Status: SHIPPED | OUTPATIENT
Start: 2025-02-05

## 2025-02-05 RX ADMIN — ACETAMINOPHEN 650 MG: 325 TABLET ORAL at 03:39

## 2025-02-05 RX ADMIN — ASPIRIN 81 MG: 81 TABLET, COATED ORAL at 05:28

## 2025-02-05 ASSESSMENT — PAIN DESCRIPTION - PAIN TYPE
TYPE: ACUTE PAIN
TYPE: ACUTE PAIN

## 2025-02-05 NOTE — DISCHARGE INSTRUCTIONS
You were hospitalized for chest pain. While this seemed atypical for heart attack, you did have mild abnormality in your labs. We did an ultrasound of your heart and it appeared to be without any compromised heart muscle function. Your trop stayed stable not indicated acute heart attack. It's possible you are having coronary artery spasm or esophageal spasm, nitroglycerin can help with both. We did offer a stress test but unfortunately you could not stay for the test so this has been ordered urgently as outpatient including outpatient urgent cardiology referral. We also added GI referral for you. You may trial 2 weeks of omeprazole and see if this is helping.

## 2025-02-05 NOTE — CARE PLAN
The patient is Stable - Low risk of patient condition declining or worsening    Shift Goals  Clinical Goals: NPO, stress test, monitor tele & vitals  Patient Goals: discharge, make it 2pm appointment    Progress made toward(s) clinical / shift goals:    Problem: Knowledge Deficit - Standard  Goal: Patient and family/care givers will demonstrate understanding of plan of care, disease process/condition, diagnostic tests and medications  Outcome: Progressing  Note: Patient verbalized understanding of having to reschedule stress test for 1200. MD aware. Patient and family was accommodating to change in plan of care.      Problem: Pain - Standard  Goal: Alleviation of pain or a reduction in pain to the patient’s comfort goal  Outcome: Progressing  Note: Patient denied chest pain and was informed to notify staff if it were to occur.        Patient is not progressing towards the following goals:

## 2025-02-05 NOTE — CARE PLAN
The patient is Stable - Low risk of patient condition declining or worsening    Shift Goals  Clinical Goals: NPO at midnight for AM stress test, monitor patients heart rate through out the night.  Patient Goals: Sleep    Progress made toward(s) clinical / shift goals:  Patient will stay NPO at midnight.     Problem: Knowledge Deficit - Standard  Goal: Patient and family/care givers will demonstrate understanding of plan of care, disease process/condition, diagnostic tests and medications  Outcome: Progressing       Patient is not progressing towards the following goals:

## 2025-02-05 NOTE — PROGRESS NOTES
Received report from Juan R. Checked in on patient, he is resting in bed. Call light within reach, safety measures in place, no other needs at this time.

## 2025-02-05 NOTE — DISCHARGE SUMMARY
Hospital Medicine Discharge Note     Admit Date:  2/4/2025       Discharge Date:   2/5/2025  LOS: 0 days     Primary Care Provider:    Triny Aguirre M.D.    Attending Physician:  Kassi Troy M.D.     Discharge Diagnoses:   Principal Problem:    Chest pain  Active Problems:    Dyslipidemia    Acquired hypothyroidism    Demand ischemia of myocardium (HCC)    Extrasystole    Elevated AST (SGOT)    Left lower lobe pulmonary nodule    Rib fracture        Hospital Summary (Brief Narrative):         75 yo w hx of HLD, extra systole, presented with chest pain. Onset started after eating toast and sipping on coffee. Points to epigastric region. Denied sob, nausea, diaphoresis, or any radiation. Had something similar weeks ago but a lot shorter duration. This pain lasted about 15 minutes and he came right to ER. Received SLN and pain resolved.  In ER, EKG w/o ischemic changes, trop 24->21 -> 19, d dimer/bnp up with CTPA neg for PE. Ast also elevated. Admitted for ACS rule out.     Pt denied FH of ACS, denies personal hx of ACS as well. Skies regularly without any sob/cp. Very active otherwise. Has chronically low heart rate. No ALANNAH, orthopnea, PND.     Admitted to have stress test in AM. At midnight had similar epigastric /lower sternal region pain again. GI cocktail did not help. SLN relieved pain. Plan was nuclear stress at noon on 2/5 but patient stated he had an orthopedic appnt he could not miss hence declined inpatient stress testing. I arranged for him outpatient stress to be done on 2/7. I also referred him to cardiology as well as GI for further work up.     Ddx includes esophageal spasm, coronary artery spasm, less likely gastritis. He was tolerating oral intake. LFTs otherwise unremarkable except the mildly elev AST. Lipase nl. His pain was atypical for cardiac pain, he did not have uptrending trop and EKG did not suggest acute MI. TTE with nl LVEF w/o SWMAS. Thus outpatient work up is reasonable.  Return precautions given, along with SLN rx.    Disposition:   Discharge home    Condition:  Stable    Activity:   As tolerated     Diet:   Heart Healthy Diet / Diabetic Diet    Discharge Medications:           Medication List        START taking these medications        Instructions   aspirin 81 MG EC tablet  Start taking on: February 6, 2025   Take 1 Tablet by mouth every day.  Dose: 81 mg     nitroglycerin 0.4 MG Subl  Commonly known as: Nitrostat   Place 1 Tablet under the tongue as needed for Chest Pain. Can take up to 3 doses every 5 minutes , do not exceed more than 3 doses, if chest pain persists, call 911  Dose: 0.4 mg     omeprazole 20 MG delayed-release capsule  Commonly known as: PriLOSEC   Take 1 Capsule by mouth 2 times a day for 14 days.  Dose: 20 mg            CONTINUE taking these medications        Instructions   acetaminophen 500 MG Tabs  Commonly known as: Tylenol   Take 1,000 mg by mouth every 6 hours as needed. Indications: Pain  Dose: 1,000 mg     azelastine 137 MCG/SPRAY nasal spray  Commonly known as: Astelin   Administer 2 Sprays into affected nostril(S) 2 times a day as needed (For congestion).  Dose: 2 Spray     b complex vitamins tablet   Take 1 Tab by mouth every day. Indications: 100 mg  Dose: 1 Tablet     COENZYME Q10 PO   Take 1 Capsule by mouth every day.  Dose: 1 Capsule     D3 PO   Take 1 Capsule by mouth every day.  Dose: 1 Capsule     ezetimibe 10 MG Tabs  Commonly known as: Zetia   Take 1 Tablet by mouth every day.  Dose: 10 mg     levothyroxine 75 MCG Tabs  Commonly known as: Synthroid   Take 1 Tablet by mouth every morning on an empty stomach.  Dose: 75 mcg     rosuvastatin 5 MG Tabs  Commonly known as: Crestor   Take 1 Tablet by mouth every 48 hours.  Dose: 5 mg     tadalafil 5 MG tablet  Commonly known as: Cialis   Take 1 Tablet by mouth every day. Do not take concurrently with tamsulosin.  Dose: 5 mg                Follow up appointment details :      I encouraged him to  call his PCP to confirm follow up after discharge.    Future Appointments   Date Time Provider Department Center   2/7/2025  9:00 AM [facility] ECAM ROWAN [place]   3/19/2025  9:40 AM Ar GREEN M.D. [facility] None         Consultants:      None    Studies:    Imaging/ Testing:      EC-ECHOCARDIOGRAM COMPLETE W/O CONT   Final Result      CT-CTA CHEST PULMONARY ARTERY W/ RECONS   Final Result         1. There is a small area of nodular infiltrate in the left lower lobe.   2. Chronic left chest wall deformity.   3. Atherosclerosis with dilatation of the ascending aorta.   4. No evidence for pulmonary embolism.            DX-CHEST-PORTABLE (1 VIEW)   Final Result         1.  No acute cardiopulmonary disease.   2.  Left rib fractures      NM-CARDIAC STRESS TEST    (Results Pending)       Procedures:        None      Instructions:      The were given instructions to return to the ER if patient's condition worsens      Time Spent on Discharge:     Discharge instructions were discussed with the patient at bedside. Patient  expressed understanding and agreed to comply with all discharge instructions.    37 minutes were spent in the discharge planning and management of this  patient, including more than 50% of the time spent face to face in   Counseling.

## 2025-02-05 NOTE — PROGRESS NOTES
LATE ENTRY    0751 Per nuc med tech, patient was unable to have stress test today due to nitroglycerin administration on 2/4 at 2353. MD notified.    0908 Per Tiana, stress test will be rescheduled for 1200. MD and patient notified.     1157 Patient requested to schedule stress test outpatient and follow-up with cardiology & GI. He has an appointment at 1400 he does not want to miss. MD notified.     1220 Transporter arrived to take patient to [facility]. Patient refused.     1230 MD notified of patient's refusal of stress test. Per MD, patient can discharge.    1245 PIV and tele monitor removed.     1300 Discharge education provided to the patient and spouse. All questions addressed at this time. Per patient, stress test was scheduled outpatient. Cardiology & GI referrals were placed by MD. Patient discharged with all belongings.    Previously Declined (within the last year)

## 2025-02-05 NOTE — PROGRESS NOTES
Telemetry Shift Summary     Per monitor tech:  Rhythm SB/SR w/ 1st degree heart block  HR Range 50s-60s  Ectopy None noted (new admit)  Measurements 0.26/0.08/0.40      Normal Values  Rhythm SR  HR Range:   Measurements: 0.12-0.20/0.06-0.10/0.30-0.52

## 2025-02-05 NOTE — CARE PLAN
The patient is Stable - Low risk of patient condition declining or worsening    Shift Goals  Clinical Goals: No CP, stress test, echo  Patient Goals: DC early AM, no MI    Progress made toward(s) clinical / shift goals:  Pt has no c/o pain or discomfort. Pt makes needs known. Pt refusing bed alarms and 2 nurse skin assessment. Pt steady on feet and stated they will let staff know if they have any mobility concerns. Echo completed, stress test in AM. WAGNER.    Patient is not progressing towards the following goals:      Problem: Knowledge Deficit - Standard  Goal: Patient and family/care givers will demonstrate understanding of plan of care, disease process/condition, diagnostic tests and medications  Outcome: Progressing

## 2025-02-05 NOTE — DISCHARGE PLANNING
Met with pt who has no dc concerns. Wife at bedside to take him home.    Care Transition Team Assessment    Information Source  Orientation Level: Oriented X4  Information Given By: Patient  Who is responsible for making decisions for patient? : Patient    Readmission Evaluation  Is this a readmission?: No    Elopement Risk  Legal Hold: No  Ambulatory or Self Mobile in Wheelchair: Yes  Disoriented: No  Psychiatric Symptoms: None  History of Wandering: No  Elopement this Admit: No  Vocalizing Wanting to Leave: No  Displays Behaviors, Body Language Wanting to Leave: No-Not at Risk for Elopement  Elopement Risk: Not at Risk for Elopement    Interdisciplinary Discharge Planning  Does Admitting Nurse Feel This Could be a Complex Discharge?: No  Lives with - Patient's Self Care Capacity: Spouse  Patient or legal guardian wants to designate a caregiver: No  Support Systems: Children  Housing / Facility: [address]  Do You Take your Prescribed Medications Regularly: Yes  Able to Return to Previous ADL's: Yes  Mobility Issues: No  Prior Services: None, Home-Independent  Patient Prefers to be Discharged to:: Home  Assistance Needed: No              Finances  Financial Barriers to Discharge: No  Prescription Coverage: Yes         Values / Beliefs / Concerns  Values / Beliefs Concerns : No    Advance Directive  Advance Directive?: None    Domestic Abuse  Have you ever been the victim of abuse or violence?: No  Possible Abuse/Neglect Reported to:: Not Applicable              Anticipated Discharge Information  Discharge Disposition: Discharged to home/self care (01)

## 2025-02-05 NOTE — PROGRESS NOTES
Telemetry Shift Summary     Rhythm: ST/SB with 1st degree heart block  HR:   Ectopy: F-O/PVC R/couplet R/PAC    Measurements: .28/.10/.40    Normal Values  Rhythm: SR  HR:   Measurements: 0.12-0.20/0.08-0.10/0.30-0.52

## 2025-02-07 ENCOUNTER — HOSPITAL ENCOUNTER (OUTPATIENT)
Dept: RADIOLOGY | Facility: MEDICAL CENTER | Age: 75
End: 2025-02-07
Attending: INTERNAL MEDICINE
Payer: MEDICARE

## 2025-02-07 ENCOUNTER — APPOINTMENT (OUTPATIENT)
Dept: INTERNAL MEDICINE | Facility: IMAGING CENTER | Age: 75
End: 2025-02-07
Payer: MEDICARE

## 2025-02-07 DIAGNOSIS — R79.89 TROPONIN LEVEL ELEVATED: ICD-10-CM

## 2025-02-07 DIAGNOSIS — R07.9 ACUTE CHEST PAIN: ICD-10-CM

## 2025-02-07 PROCEDURE — 78452 HT MUSCLE IMAGE SPECT MULT: CPT | Mod: 26 | Performed by: INTERNAL MEDICINE

## 2025-02-07 PROCEDURE — 93018 CV STRESS TEST I&R ONLY: CPT | Performed by: INTERNAL MEDICINE

## 2025-02-07 PROCEDURE — A9502 TC99M TETROFOSMIN: HCPCS

## 2025-02-11 ENCOUNTER — TELEPHONE (OUTPATIENT)
Dept: CARDIOLOGY | Facility: MEDICAL CENTER | Age: 75
End: 2025-02-11

## 2025-02-11 ENCOUNTER — APPOINTMENT (OUTPATIENT)
Dept: INTERNAL MEDICINE | Facility: IMAGING CENTER | Age: 75
End: 2025-02-11
Payer: MEDICARE

## 2025-02-11 NOTE — TELEPHONE ENCOUNTER
ADD    Caller: Dr. Trav Cooley    Patient's primary care physician    Calling about: Dr. Cooley requesting to speak with Dr. Marquez and discuss this mutual patient prior to his new patient appointment scheduled this Thursday 02/13/2025.      Callback Number (Personal numbers in routing comments): See routing comments.    Thank you,  Radha WALTON

## 2025-02-12 NOTE — TELEPHONE ENCOUNTER
ADD    Caller: Dr. Trav Cooley     Topic/issue: Patient's primary care physician called to follow-up on a [facility] call request before their patient's initial appointment on 2/13/25.    Callback Number: (See routing comments for personal cell phone)    Thank you,  Louie RG

## 2025-02-12 NOTE — TELEPHONE ENCOUNTER
TT: Please see routing comments for physician phone number. Pt is scheduled with you tomorrow 2/13/25 at 3:15pm

## 2025-02-13 ENCOUNTER — OFFICE VISIT (OUTPATIENT)
Dept: CARDIOLOGY | Facility: MEDICAL CENTER | Age: 75
End: 2025-02-13
Attending: INTERNAL MEDICINE
Payer: MEDICARE

## 2025-02-13 VITALS
SYSTOLIC BLOOD PRESSURE: 126 MMHG | HEIGHT: 75 IN | HEART RATE: 61 BPM | WEIGHT: 227 LBS | BODY MASS INDEX: 28.23 KG/M2 | OXYGEN SATURATION: 96 % | DIASTOLIC BLOOD PRESSURE: 84 MMHG | RESPIRATION RATE: 16 BRPM

## 2025-02-13 DIAGNOSIS — R07.89 ATYPICAL CHEST PAIN: ICD-10-CM

## 2025-02-13 DIAGNOSIS — R06.83 SNORING: ICD-10-CM

## 2025-02-13 DIAGNOSIS — I77.810 ASCENDING AORTA DILATATION (HCC): ICD-10-CM

## 2025-02-13 DIAGNOSIS — R40.0 DAYTIME SLEEPINESS: ICD-10-CM

## 2025-02-13 PROCEDURE — 99204 OFFICE O/P NEW MOD 45 MIN: CPT | Performed by: INTERNAL MEDICINE

## 2025-02-13 PROCEDURE — G2211 COMPLEX E/M VISIT ADD ON: HCPCS | Performed by: INTERNAL MEDICINE

## 2025-02-13 PROCEDURE — 99213 OFFICE O/P EST LOW 20 MIN: CPT | Performed by: INTERNAL MEDICINE

## 2025-02-13 RX ORDER — NEBIVOLOL 5 MG/1
5 TABLET ORAL DAILY
Qty: 90 TABLET | Refills: 4 | Status: SHIPPED | OUTPATIENT
Start: 2025-02-13

## 2025-02-13 ASSESSMENT — ENCOUNTER SYMPTOMS
BLOOD IN STOOL: 0
DEPRESSION: 0
BLURRED VISION: 0
VOMITING: 0
FEVER: 0
ABDOMINAL PAIN: 0
MYALGIAS: 0
EYE DISCHARGE: 0
BRUISES/BLEEDS EASILY: 0
ORTHOPNEA: 0
WEIGHT LOSS: 0
NAUSEA: 0
SPEECH CHANGE: 0
FALLS: 0
LOSS OF CONSCIOUSNESS: 0
CLAUDICATION: 0
PND: 0
DIZZINESS: 0
DOUBLE VISION: 0
PALPITATIONS: 0
EYE PAIN: 0
SHORTNESS OF BREATH: 0
HALLUCINATIONS: 0
CHILLS: 0
COUGH: 0
SENSORY CHANGE: 0
HEADACHES: 0

## 2025-02-13 ASSESSMENT — FIBROSIS 4 INDEX: FIB4 SCORE: 4.04
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
## 2025-02-19 NOTE — Clinical Note
REFERRAL APPROVAL NOTICE         Sent on February 18, 2025                   Emre Burrowscher  [address] [place] [address]                   Dear Mr. Hughes,    After a careful review of the medical information and benefit coverage, [facility] has processed your referral. See below for additional details.    If applicable, you must be actively enrolled with your insurance for coverage of the authorized service. If you have any questions regarding your coverage, please contact your insurance directly.    REFERRAL INFORMATION   Referral #:  [id number]  Referred-To Department    Referred-By Provider:  Pulmonary and Sleep Medicine    Pina Marquez M.D.   Pulmonary/sleep [facility]      [address]  [address]  [place] [address]  [phone] [address]  [place] [address] [phone]    Referral Start Date:  02/13/2025  Referral End Date:   02/13/2026           SCHEDULING  If you do not already have an appointment, please call [phone] to make an appointment.   MORE INFORMATION  As a reminder, [facility] ownership has changed, meaning this location is now owned and operated by [facility]. As such, we want to clarify that our patients should expect to receive two separate bills for the services received at [facility] - one representing the [facility] facility fees as the owner of the establishment, and the other to represent the physician's services and subsequent fees. You can speak with your insurance carrier for a pricing estimate by calling the customer service number on the back of your card and ask about charges for a hospital outpatient visit.  If you do not already have a [facility] account, sign up at: [email or web address]  You can access your medical information, make appointments, see lab results, billing  information, and more.  If you have questions regarding this referral, please contact  the [facility] department at:             [phone]. Monday - Friday 7:30AM - 5:00PM.      Sincerely,  [facility]

## 2025-05-16 ENCOUNTER — HOSPITAL ENCOUNTER (OUTPATIENT)
Facility: MEDICAL CENTER | Age: 75
End: 2025-05-16
Payer: MEDICARE

## 2025-05-16 PROCEDURE — 87086 URINE CULTURE/COLONY COUNT: CPT

## 2025-05-16 PROCEDURE — 87077 CULTURE AEROBIC IDENTIFY: CPT

## 2025-06-13 ENCOUNTER — APPOINTMENT (OUTPATIENT)
Dept: RADIOLOGY | Facility: MEDICAL CENTER | Age: 75
End: 2025-06-13
Attending: EMERGENCY MEDICINE
Payer: MEDICARE

## 2025-06-13 ENCOUNTER — HOSPITAL ENCOUNTER (INPATIENT)
Facility: MEDICAL CENTER | Age: 75
LOS: 1 days | DRG: 068 | End: 2025-06-14
Attending: INTERNAL MEDICINE | Admitting: STUDENT IN AN ORGANIZED HEALTH CARE EDUCATION/TRAINING PROGRAM
Payer: MEDICARE

## 2025-06-13 ENCOUNTER — NON-PROVIDER VISIT (OUTPATIENT)
Dept: CARDIOLOGY | Facility: MEDICAL CENTER | Age: 75
End: 2025-06-13
Payer: MEDICARE

## 2025-06-13 ENCOUNTER — HOSPITAL ENCOUNTER (EMERGENCY)
Facility: MEDICAL CENTER | Age: 75
End: 2025-06-13
Attending: EMERGENCY MEDICINE
Payer: MEDICARE

## 2025-06-13 VITALS
HEIGHT: 75 IN | SYSTOLIC BLOOD PRESSURE: 162 MMHG | RESPIRATION RATE: 16 BRPM | HEART RATE: 50 BPM | BODY MASS INDEX: 27.99 KG/M2 | OXYGEN SATURATION: 97 % | WEIGHT: 225.09 LBS | DIASTOLIC BLOOD PRESSURE: 91 MMHG | TEMPERATURE: 97.2 F

## 2025-06-13 DIAGNOSIS — I65.02 OCCLUSION OF LEFT VERTEBRAL ARTERY: ICD-10-CM

## 2025-06-13 DIAGNOSIS — I63.011 CEREBROVASCULAR ACCIDENT (CVA) DUE TO THROMBOSIS OF RIGHT VERTEBRAL ARTERY (HCC): Primary | ICD-10-CM

## 2025-06-13 DIAGNOSIS — I49.3 FREQUENT PVCS: ICD-10-CM

## 2025-06-13 DIAGNOSIS — N40.1 BENIGN NON-NODULAR PROSTATIC HYPERPLASIA WITH LOWER URINARY TRACT SYMPTOMS: ICD-10-CM

## 2025-06-13 DIAGNOSIS — R00.1 SYMPTOMATIC BRADYCARDIA: Primary | ICD-10-CM

## 2025-06-13 DIAGNOSIS — I47.10 SVT (SUPRAVENTRICULAR TACHYCARDIA) (HCC): Primary | ICD-10-CM

## 2025-06-13 DIAGNOSIS — R29.90 STROKE-LIKE SYMPTOMS: ICD-10-CM

## 2025-06-13 PROBLEM — R73.9 HYPERGLYCEMIA: Status: ACTIVE | Noted: 2025-06-13

## 2025-06-13 PROBLEM — N17.9 AKI (ACUTE KIDNEY INJURY) (HCC): Status: ACTIVE | Noted: 2025-06-13

## 2025-06-13 LAB
ALBUMIN SERPL BCP-MCNC: 4.2 G/DL (ref 3.2–4.9)
ALBUMIN/GLOB SERPL: 1.4 G/DL
ALP SERPL-CCNC: 49 U/L (ref 30–99)
ALT SERPL-CCNC: 40 U/L (ref 2–50)
ANION GAP SERPL CALC-SCNC: 13 MMOL/L (ref 7–16)
APTT PPP: 25.9 SEC (ref 24.7–36)
AST SERPL-CCNC: 39 U/L (ref 12–45)
BASOPHILS # BLD AUTO: 0.4 % (ref 0–1.8)
BASOPHILS # BLD: 0.03 K/UL (ref 0–0.12)
BILIRUB SERPL-MCNC: 0.8 MG/DL (ref 0.1–1.5)
BUN SERPL-MCNC: 29 MG/DL (ref 8–22)
CALCIUM ALBUM COR SERPL-MCNC: 8.9 MG/DL (ref 8.5–10.5)
CALCIUM SERPL-MCNC: 9.1 MG/DL (ref 8.5–10.5)
CHLORIDE SERPL-SCNC: 105 MMOL/L (ref 96–112)
CK SERPL-CCNC: 151 U/L (ref 0–154)
CO2 SERPL-SCNC: 21 MMOL/L (ref 20–33)
CREAT SERPL-MCNC: 1.22 MG/DL (ref 0.5–1.4)
EKG IMPRESSION: NORMAL
EOSINOPHIL # BLD AUTO: 0.13 K/UL (ref 0–0.51)
EOSINOPHIL NFR BLD: 1.9 % (ref 0–6.9)
ERYTHROCYTE [DISTWIDTH] IN BLOOD BY AUTOMATED COUNT: 46.7 FL (ref 35.9–50)
EST. AVERAGE GLUCOSE BLD GHB EST-MCNC: 114 MG/DL
GFR SERPLBLD CREATININE-BSD FMLA CKD-EPI: 63 ML/MIN/1.73 M 2
GLOBULIN SER CALC-MCNC: 2.9 G/DL (ref 1.9–3.5)
GLUCOSE BLD STRIP.AUTO-MCNC: 134 MG/DL (ref 65–99)
GLUCOSE SERPL-MCNC: 134 MG/DL (ref 65–99)
HBA1C MFR BLD: 5.6 % (ref 4–5.6)
HCT VFR BLD AUTO: 43.7 % (ref 42–52)
HGB BLD-MCNC: 14.8 G/DL (ref 14–18)
IMM GRANULOCYTES # BLD AUTO: 0.02 K/UL (ref 0–0.11)
IMM GRANULOCYTES NFR BLD AUTO: 0.3 % (ref 0–0.9)
INR PPP: 1.08 (ref 0.87–1.13)
LYMPHOCYTES # BLD AUTO: 1.36 K/UL (ref 1–4.8)
LYMPHOCYTES NFR BLD: 19.8 % (ref 22–41)
MAGNESIUM SERPL-MCNC: 2.3 MG/DL (ref 1.5–2.5)
MCH RBC QN AUTO: 33.2 PG (ref 27–33)
MCHC RBC AUTO-ENTMCNC: 33.9 G/DL (ref 32.3–36.5)
MCV RBC AUTO: 98 FL (ref 81.4–97.8)
MONOCYTES # BLD AUTO: 0.54 K/UL (ref 0–0.85)
MONOCYTES NFR BLD AUTO: 7.9 % (ref 0–13.4)
NEUTROPHILS # BLD AUTO: 4.78 K/UL (ref 1.82–7.42)
NEUTROPHILS NFR BLD: 69.7 % (ref 44–72)
NRBC # BLD AUTO: 0 K/UL
NRBC BLD-RTO: 0 /100 WBC (ref 0–0.2)
NT-PROBNP SERPL IA-MCNC: 827 PG/ML (ref 0–125)
PHOSPHATE SERPL-MCNC: 3.1 MG/DL (ref 2.5–4.5)
PLATELET # BLD AUTO: 187 K/UL (ref 164–446)
PMV BLD AUTO: 11.3 FL (ref 9–12.9)
POTASSIUM SERPL-SCNC: 4.2 MMOL/L (ref 3.6–5.5)
PROT SERPL-MCNC: 7.1 G/DL (ref 6–8.2)
PROTHROMBIN TIME: 14.4 SEC (ref 12–14.6)
RBC # BLD AUTO: 4.46 M/UL (ref 4.7–6.1)
SODIUM SERPL-SCNC: 139 MMOL/L (ref 135–145)
TROPONIN T SERPL-MCNC: 30 NG/L (ref 6–19)
TROPONIN T SERPL-MCNC: 33 NG/L (ref 6–19)
WBC # BLD AUTO: 6.9 K/UL (ref 4.8–10.8)

## 2025-06-13 PROCEDURE — 82962 GLUCOSE BLOOD TEST: CPT | Performed by: EMERGENCY MEDICINE

## 2025-06-13 PROCEDURE — 0042T CT-CEREBRAL PERFUSION ANALYSIS: CPT

## 2025-06-13 PROCEDURE — 99285 EMERGENCY DEPT VISIT HI MDM: CPT

## 2025-06-13 PROCEDURE — 93005 ELECTROCARDIOGRAM TRACING: CPT | Mod: TC | Performed by: EMERGENCY MEDICINE

## 2025-06-13 PROCEDURE — 83880 ASSAY OF NATRIURETIC PEPTIDE: CPT

## 2025-06-13 PROCEDURE — 83735 ASSAY OF MAGNESIUM: CPT

## 2025-06-13 PROCEDURE — 36415 COLL VENOUS BLD VENIPUNCTURE: CPT

## 2025-06-13 PROCEDURE — 70450 CT HEAD/BRAIN W/O DYE: CPT | Mod: XU

## 2025-06-13 PROCEDURE — 99291 CRITICAL CARE FIRST HOUR: CPT | Performed by: STUDENT IN AN ORGANIZED HEALTH CARE EDUCATION/TRAINING PROGRAM

## 2025-06-13 PROCEDURE — 70496 CT ANGIOGRAPHY HEAD: CPT

## 2025-06-13 PROCEDURE — 70498 CT ANGIOGRAPHY NECK: CPT

## 2025-06-13 PROCEDURE — 770000 HCHG ROOM/CARE - INTERMEDIATE ICU *

## 2025-06-13 PROCEDURE — 84100 ASSAY OF PHOSPHORUS: CPT

## 2025-06-13 PROCEDURE — 85610 PROTHROMBIN TIME: CPT

## 2025-06-13 PROCEDURE — 700117 HCHG RX CONTRAST REV CODE 255: Performed by: EMERGENCY MEDICINE

## 2025-06-13 PROCEDURE — 85025 COMPLETE CBC W/AUTO DIFF WBC: CPT

## 2025-06-13 PROCEDURE — 84484 ASSAY OF TROPONIN QUANT: CPT | Mod: 91

## 2025-06-13 PROCEDURE — 84484 ASSAY OF TROPONIN QUANT: CPT

## 2025-06-13 PROCEDURE — 700111 HCHG RX REV CODE 636 W/ 250 OVERRIDE (IP): Mod: JZ | Performed by: STUDENT IN AN ORGANIZED HEALTH CARE EDUCATION/TRAINING PROGRAM

## 2025-06-13 PROCEDURE — 71045 X-RAY EXAM CHEST 1 VIEW: CPT

## 2025-06-13 PROCEDURE — 80053 COMPREHEN METABOLIC PANEL: CPT

## 2025-06-13 PROCEDURE — 82550 ASSAY OF CK (CPK): CPT

## 2025-06-13 PROCEDURE — 83036 HEMOGLOBIN GLYCOSYLATED A1C: CPT

## 2025-06-13 PROCEDURE — 85730 THROMBOPLASTIN TIME PARTIAL: CPT

## 2025-06-13 RX ORDER — ACETAMINOPHEN 325 MG/1
650 TABLET ORAL EVERY 6 HOURS PRN
Status: DISCONTINUED | OUTPATIENT
Start: 2025-06-13 | End: 2025-06-14 | Stop reason: HOSPADM

## 2025-06-13 RX ORDER — SODIUM CHLORIDE 9 MG/ML
INJECTION, SOLUTION INTRAVENOUS CONTINUOUS
Status: DISCONTINUED | OUTPATIENT
Start: 2025-06-13 | End: 2025-06-14

## 2025-06-13 RX ORDER — ASPIRIN 300 MG/1
300 SUPPOSITORY RECTAL DAILY
Status: DISCONTINUED | OUTPATIENT
Start: 2025-06-14 | End: 2025-06-13

## 2025-06-13 RX ORDER — ONDANSETRON 4 MG/1
4 TABLET, ORALLY DISINTEGRATING ORAL EVERY 4 HOURS PRN
Status: DISCONTINUED | OUTPATIENT
Start: 2025-06-13 | End: 2025-06-14 | Stop reason: HOSPADM

## 2025-06-13 RX ORDER — ATORVASTATIN CALCIUM 40 MG/1
40 TABLET, FILM COATED ORAL EVERY EVENING
Status: DISCONTINUED | OUTPATIENT
Start: 2025-06-13 | End: 2025-06-13

## 2025-06-13 RX ORDER — POLYETHYLENE GLYCOL 3350 17 G/17G
1 POWDER, FOR SOLUTION ORAL
Status: DISCONTINUED | OUTPATIENT
Start: 2025-06-13 | End: 2025-06-14 | Stop reason: HOSPADM

## 2025-06-13 RX ORDER — LEVOTHYROXINE SODIUM 75 UG/1
75 TABLET ORAL
Status: DISCONTINUED | OUTPATIENT
Start: 2025-06-14 | End: 2025-06-14 | Stop reason: HOSPADM

## 2025-06-13 RX ORDER — ROSUVASTATIN CALCIUM 10 MG/1
5 TABLET, COATED ORAL
Status: DISCONTINUED | OUTPATIENT
Start: 2025-06-15 | End: 2025-06-14 | Stop reason: HOSPADM

## 2025-06-13 RX ORDER — CALCIUM GLUCONATE 20 MG/ML
1 INJECTION, SOLUTION INTRAVENOUS ONCE
Status: COMPLETED | OUTPATIENT
Start: 2025-06-13 | End: 2025-06-14

## 2025-06-13 RX ORDER — AZELASTINE 1 MG/ML
2 SPRAY, METERED NASAL 2 TIMES DAILY PRN
Status: DISCONTINUED | OUTPATIENT
Start: 2025-06-13 | End: 2025-06-13

## 2025-06-13 RX ORDER — ASPIRIN 81 MG/1
81 TABLET ORAL DAILY
Status: DISCONTINUED | OUTPATIENT
Start: 2025-06-14 | End: 2025-06-14

## 2025-06-13 RX ORDER — HYDRALAZINE HYDROCHLORIDE 20 MG/ML
10 INJECTION INTRAMUSCULAR; INTRAVENOUS EVERY 4 HOURS PRN
Status: DISCONTINUED | OUTPATIENT
Start: 2025-06-13 | End: 2025-06-14 | Stop reason: HOSPADM

## 2025-06-13 RX ORDER — EZETIMIBE 10 MG/1
10 TABLET ORAL DAILY
Status: DISCONTINUED | OUTPATIENT
Start: 2025-06-14 | End: 2025-06-14 | Stop reason: HOSPADM

## 2025-06-13 RX ORDER — SODIUM CHLORIDE, SODIUM LACTATE, POTASSIUM CHLORIDE, AND CALCIUM CHLORIDE .6; .31; .03; .02 G/100ML; G/100ML; G/100ML; G/100ML
500 INJECTION, SOLUTION INTRAVENOUS
Status: DISCONTINUED | OUTPATIENT
Start: 2025-06-13 | End: 2025-06-14 | Stop reason: HOSPADM

## 2025-06-13 RX ORDER — IPRATROPIUM BROMIDE AND ALBUTEROL SULFATE 2.5; .5 MG/3ML; MG/3ML
3 SOLUTION RESPIRATORY (INHALATION)
Status: DISCONTINUED | OUTPATIENT
Start: 2025-06-13 | End: 2025-06-14 | Stop reason: HOSPADM

## 2025-06-13 RX ORDER — IBUPROFEN 200 MG
200 TABLET ORAL EVERY 6 HOURS PRN
Status: ON HOLD | COMMUNITY
End: 2025-06-14

## 2025-06-13 RX ORDER — ASPIRIN 81 MG/1
81 TABLET, CHEWABLE ORAL DAILY
Status: DISCONTINUED | OUTPATIENT
Start: 2025-06-14 | End: 2025-06-13

## 2025-06-13 RX ORDER — ONDANSETRON 2 MG/ML
4 INJECTION INTRAMUSCULAR; INTRAVENOUS EVERY 4 HOURS PRN
Status: DISCONTINUED | OUTPATIENT
Start: 2025-06-13 | End: 2025-06-14 | Stop reason: HOSPADM

## 2025-06-13 RX ORDER — AMOXICILLIN 250 MG
2 CAPSULE ORAL EVERY EVENING
Status: DISCONTINUED | OUTPATIENT
Start: 2025-06-13 | End: 2025-06-14 | Stop reason: HOSPADM

## 2025-06-13 RX ADMIN — CALCIUM GLUCONATE 1 G: 20 INJECTION, SOLUTION INTRAVENOUS at 23:00

## 2025-06-13 RX ADMIN — IOHEXOL 100 ML: 350 INJECTION, SOLUTION INTRAVENOUS at 19:15

## 2025-06-13 ASSESSMENT — ENCOUNTER SYMPTOMS
HEADACHES: 0
ABDOMINAL PAIN: 0
PALPITATIONS: 0
HEARTBURN: 0
COUGH: 0
TREMORS: 0
FOCAL WEAKNESS: 0
CHILLS: 0
VOMITING: 0
MYALGIAS: 0
LOSS OF CONSCIOUSNESS: 0
DOUBLE VISION: 0
SENSORY CHANGE: 0
DIZZINESS: 1
WEAKNESS: 1
SEIZURES: 0
NAUSEA: 0
SPEECH CHANGE: 0
SHORTNESS OF BREATH: 0
BRUISES/BLEEDS EASILY: 0
DEPRESSION: 0
TINGLING: 0
BLURRED VISION: 0
FEVER: 0

## 2025-06-13 ASSESSMENT — COGNITIVE AND FUNCTIONAL STATUS - GENERAL
SUGGESTED CMS G CODE MODIFIER MOBILITY: CH
SUGGESTED CMS G CODE MODIFIER DAILY ACTIVITY: CH
DAILY ACTIVITIY SCORE: 24
MOBILITY SCORE: 24

## 2025-06-13 ASSESSMENT — FIBROSIS 4 INDEX
FIB4 SCORE: 2.41
FIB4 SCORE: 4.1

## 2025-06-13 ASSESSMENT — LIFESTYLE VARIABLES
EVER FELT BAD OR GUILTY ABOUT YOUR DRINKING: NO
CONSUMPTION TOTAL: INCOMPLETE
SUBSTANCE_ABUSE: 0
AVERAGE NUMBER OF DAYS PER WEEK YOU HAVE A DRINK CONTAINING ALCOHOL: 3
ON A TYPICAL DAY WHEN YOU DRINK ALCOHOL HOW MANY DRINKS DO YOU HAVE: 1
HAVE PEOPLE ANNOYED YOU BY CRITICIZING YOUR DRINKING: NO
EVER HAD A DRINK FIRST THING IN THE MORNING TO STEADY YOUR NERVES TO GET RID OF A HANGOVER: NO
HAVE YOU EVER FELT YOU SHOULD CUT DOWN ON YOUR DRINKING: NO
DOES PATIENT WANT TO STOP DRINKING: NO
TOTAL SCORE: 0
ALCOHOL_USE: YES
TOTAL SCORE: 0
TOTAL SCORE: 0

## 2025-06-13 ASSESSMENT — SOCIAL DETERMINANTS OF HEALTH (SDOH)
WITHIN THE LAST YEAR, HAVE YOU BEEN HUMILIATED OR EMOTIONALLY ABUSED IN OTHER WAYS BY YOUR PARTNER OR EX-PARTNER?: NO
WITHIN THE LAST YEAR, HAVE YOU BEEN KICKED, HIT, SLAPPED, OR OTHERWISE PHYSICALLY HURT BY YOUR PARTNER OR EX-PARTNER?: NO
WITHIN THE LAST YEAR, HAVE YOU BEEN AFRAID OF YOUR PARTNER OR EX-PARTNER?: NO
WITHIN THE LAST YEAR, HAVE TO BEEN RAPED OR FORCED TO HAVE ANY KIND OF SEXUAL ACTIVITY BY YOUR PARTNER OR EX-PARTNER?: NO

## 2025-06-13 ASSESSMENT — PATIENT HEALTH QUESTIONNAIRE - PHQ9
2. FEELING DOWN, DEPRESSED, IRRITABLE, OR HOPELESS: NOT AT ALL
1. LITTLE INTEREST OR PLEASURE IN DOING THINGS: NOT AT ALL
SUM OF ALL RESPONSES TO PHQ9 QUESTIONS 1 AND 2: 0

## 2025-06-13 ASSESSMENT — PAIN DESCRIPTION - PAIN TYPE: TYPE: ACUTE PAIN

## 2025-06-14 ENCOUNTER — APPOINTMENT (OUTPATIENT)
Dept: RADIOLOGY | Facility: MEDICAL CENTER | Age: 75
DRG: 068 | End: 2025-06-14
Attending: STUDENT IN AN ORGANIZED HEALTH CARE EDUCATION/TRAINING PROGRAM
Payer: MEDICARE

## 2025-06-14 VITALS
HEIGHT: 75 IN | TEMPERATURE: 98.2 F | OXYGEN SATURATION: 96 % | DIASTOLIC BLOOD PRESSURE: 76 MMHG | SYSTOLIC BLOOD PRESSURE: 132 MMHG | WEIGHT: 214.73 LBS | RESPIRATION RATE: 29 BRPM | BODY MASS INDEX: 26.7 KG/M2 | HEART RATE: 56 BPM

## 2025-06-14 PROBLEM — R00.1 SYMPTOMATIC BRADYCARDIA: Status: RESOLVED | Noted: 2025-06-13 | Resolved: 2025-06-14

## 2025-06-14 PROBLEM — N17.9 AKI (ACUTE KIDNEY INJURY) (HCC): Status: RESOLVED | Noted: 2025-06-13 | Resolved: 2025-06-14

## 2025-06-14 PROBLEM — R73.9 HYPERGLYCEMIA: Status: RESOLVED | Noted: 2025-06-13 | Resolved: 2025-06-14

## 2025-06-14 LAB
ALBUMIN SERPL BCP-MCNC: 3.7 G/DL (ref 3.2–4.9)
ALBUMIN/GLOB SERPL: 1.5 G/DL
ALP SERPL-CCNC: 41 U/L (ref 30–99)
ALT SERPL-CCNC: 32 U/L (ref 2–50)
ANION GAP SERPL CALC-SCNC: 9 MMOL/L (ref 7–16)
APPEARANCE UR: CLEAR
AST SERPL-CCNC: 34 U/L (ref 12–45)
BASOPHILS # BLD AUTO: 0.3 % (ref 0–1.8)
BASOPHILS # BLD: 0.02 K/UL (ref 0–0.12)
BILIRUB SERPL-MCNC: 1 MG/DL (ref 0.1–1.5)
BILIRUB UR QL STRIP.AUTO: NEGATIVE
BUN SERPL-MCNC: 25 MG/DL (ref 8–22)
CALCIUM ALBUM COR SERPL-MCNC: 8.6 MG/DL (ref 8.5–10.5)
CALCIUM SERPL-MCNC: 8.4 MG/DL (ref 8.5–10.5)
CHLORIDE SERPL-SCNC: 107 MMOL/L (ref 96–112)
CHOLEST SERPL-MCNC: 101 MG/DL (ref 100–199)
CK SERPL-CCNC: 125 U/L (ref 0–154)
CO2 SERPL-SCNC: 22 MMOL/L (ref 20–33)
COLOR UR: ABNORMAL
CREAT SERPL-MCNC: 1.01 MG/DL (ref 0.5–1.4)
CREAT UR-MCNC: 99.6 MG/DL
EOSINOPHIL # BLD AUTO: 0.11 K/UL (ref 0–0.51)
EOSINOPHIL NFR BLD: 1.9 % (ref 0–6.9)
ERYTHROCYTE [DISTWIDTH] IN BLOOD BY AUTOMATED COUNT: 46.7 FL (ref 35.9–50)
GFR SERPLBLD CREATININE-BSD FMLA CKD-EPI: 78 ML/MIN/1.73 M 2
GLOBULIN SER CALC-MCNC: 2.4 G/DL (ref 1.9–3.5)
GLUCOSE SERPL-MCNC: 92 MG/DL (ref 65–99)
GLUCOSE UR STRIP.AUTO-MCNC: NEGATIVE MG/DL
HCT VFR BLD AUTO: 39.4 % (ref 42–52)
HDLC SERPL-MCNC: 38 MG/DL
HGB BLD-MCNC: 13.4 G/DL (ref 14–18)
IMM GRANULOCYTES # BLD AUTO: 0.01 K/UL (ref 0–0.11)
IMM GRANULOCYTES NFR BLD AUTO: 0.2 % (ref 0–0.9)
KETONES UR STRIP.AUTO-MCNC: ABNORMAL MG/DL
LDLC SERPL CALC-MCNC: 50 MG/DL
LEUKOCYTE ESTERASE UR QL STRIP.AUTO: NEGATIVE
LYMPHOCYTES # BLD AUTO: 1.28 K/UL (ref 1–4.8)
LYMPHOCYTES NFR BLD: 22.3 % (ref 22–41)
MAGNESIUM SERPL-MCNC: 2 MG/DL (ref 1.5–2.5)
MCH RBC QN AUTO: 33.1 PG (ref 27–33)
MCHC RBC AUTO-ENTMCNC: 34 G/DL (ref 32.3–36.5)
MCV RBC AUTO: 97.3 FL (ref 81.4–97.8)
MICRO URNS: ABNORMAL
MONOCYTES # BLD AUTO: 0.49 K/UL (ref 0–0.85)
MONOCYTES NFR BLD AUTO: 8.6 % (ref 0–13.4)
NEUTROPHILS # BLD AUTO: 3.82 K/UL (ref 1.82–7.42)
NEUTROPHILS NFR BLD: 66.7 % (ref 44–72)
NITRITE UR QL STRIP.AUTO: NEGATIVE
NRBC # BLD AUTO: 0 K/UL
NRBC BLD-RTO: 0 /100 WBC (ref 0–0.2)
PH UR STRIP.AUTO: 7 [PH] (ref 5–8)
PHOSPHATE SERPL-MCNC: 2.7 MG/DL (ref 2.5–4.5)
PLATELET # BLD AUTO: 151 K/UL (ref 164–446)
PMV BLD AUTO: 10.9 FL (ref 9–12.9)
POTASSIUM SERPL-SCNC: 3.9 MMOL/L (ref 3.6–5.5)
PROT SERPL-MCNC: 6.1 G/DL (ref 6–8.2)
PROT UR QL STRIP: NEGATIVE MG/DL
RBC # BLD AUTO: 4.05 M/UL (ref 4.7–6.1)
RBC UR QL AUTO: NEGATIVE
SODIUM SERPL-SCNC: 138 MMOL/L (ref 135–145)
SODIUM UR-SCNC: 157 MMOL/L
SP GR UR STRIP.AUTO: >1.045
TRIGL SERPL-MCNC: 66 MG/DL (ref 0–149)
TROPONIN T SERPL-MCNC: 33 NG/L (ref 6–19)
UROBILINOGEN UR STRIP.AUTO-MCNC: 1 EU/DL
WBC # BLD AUTO: 5.7 K/UL (ref 4.8–10.8)

## 2025-06-14 PROCEDURE — 99222 1ST HOSP IP/OBS MODERATE 55: CPT | Performed by: NURSE PRACTITIONER

## 2025-06-14 PROCEDURE — 70551 MRI BRAIN STEM W/O DYE: CPT

## 2025-06-14 PROCEDURE — 700102 HCHG RX REV CODE 250 W/ 637 OVERRIDE(OP): Performed by: STUDENT IN AN ORGANIZED HEALTH CARE EDUCATION/TRAINING PROGRAM

## 2025-06-14 PROCEDURE — 80053 COMPREHEN METABOLIC PANEL: CPT

## 2025-06-14 PROCEDURE — 84100 ASSAY OF PHOSPHORUS: CPT

## 2025-06-14 PROCEDURE — 82570 ASSAY OF URINE CREATININE: CPT

## 2025-06-14 PROCEDURE — 82550 ASSAY OF CK (CPK): CPT

## 2025-06-14 PROCEDURE — 85025 COMPLETE CBC W/AUTO DIFF WBC: CPT

## 2025-06-14 PROCEDURE — 81003 URINALYSIS AUTO W/O SCOPE: CPT

## 2025-06-14 PROCEDURE — 84300 ASSAY OF URINE SODIUM: CPT

## 2025-06-14 PROCEDURE — 83735 ASSAY OF MAGNESIUM: CPT

## 2025-06-14 PROCEDURE — A9270 NON-COVERED ITEM OR SERVICE: HCPCS | Performed by: STUDENT IN AN ORGANIZED HEALTH CARE EDUCATION/TRAINING PROGRAM

## 2025-06-14 PROCEDURE — 76775 US EXAM ABDO BACK WALL LIM: CPT

## 2025-06-14 PROCEDURE — 700105 HCHG RX REV CODE 258: Performed by: STUDENT IN AN ORGANIZED HEALTH CARE EDUCATION/TRAINING PROGRAM

## 2025-06-14 PROCEDURE — 99239 HOSP IP/OBS DSCHRG MGMT >30: CPT | Performed by: HOSPITALIST

## 2025-06-14 PROCEDURE — 80061 LIPID PANEL: CPT

## 2025-06-14 PROCEDURE — 84484 ASSAY OF TROPONIN QUANT: CPT

## 2025-06-14 RX ORDER — TADALAFIL 5 MG/1
5 TABLET ORAL
Qty: 30 TABLET | Refills: 0
Start: 2025-06-14

## 2025-06-14 RX ADMIN — LEVOTHYROXINE SODIUM 75 MCG: 0.07 TABLET ORAL at 05:21

## 2025-06-14 RX ADMIN — EZETIMIBE 10 MG: 10 TABLET ORAL at 12:01

## 2025-06-14 RX ADMIN — SODIUM CHLORIDE: 9 INJECTION, SOLUTION INTRAVENOUS at 00:24

## 2025-06-14 RX ADMIN — ASPIRIN 81 MG: 81 TABLET, COATED ORAL at 12:01

## 2025-06-14 ASSESSMENT — LIFESTYLE VARIABLES
EVER HAD A DRINK FIRST THING IN THE MORNING TO STEADY YOUR NERVES TO GET RID OF A HANGOVER: NO
CONSUMPTION TOTAL: NEGATIVE
AVERAGE NUMBER OF DAYS PER WEEK YOU HAVE A DRINK CONTAINING ALCOHOL: 1
TOTAL SCORE: 0
TOTAL SCORE: 0
HAVE PEOPLE ANNOYED YOU BY CRITICIZING YOUR DRINKING: NO
HOW MANY TIMES IN THE PAST YEAR HAVE YOU HAD 5 OR MORE DRINKS IN A DAY: 0
TOTAL SCORE: 0
TOTAL SCORE: 0
ON A TYPICAL DAY WHEN YOU DRINK ALCOHOL HOW MANY DRINKS DO YOU HAVE: 1
HAVE YOU EVER FELT YOU SHOULD CUT DOWN ON YOUR DRINKING: NO
EVER HAD A DRINK FIRST THING IN THE MORNING TO STEADY YOUR NERVES TO GET RID OF A HANGOVER: NO
HAVE PEOPLE ANNOYED YOU BY CRITICIZING YOUR DRINKING: NO
DOES PATIENT WANT TO STOP DRINKING: NO
EVER FELT BAD OR GUILTY ABOUT YOUR DRINKING: NO
TOTAL SCORE: 0
DOES PATIENT WANT TO STOP DRINKING: NO
ALCOHOL_USE: YES
ALCOHOL_USE: YES
CONSUMPTION TOTAL: INCOMPLETE
HAVE YOU EVER FELT YOU SHOULD CUT DOWN ON YOUR DRINKING: NO
EVER FELT BAD OR GUILTY ABOUT YOUR DRINKING: NO
TOTAL SCORE: 0

## 2025-06-14 ASSESSMENT — PAIN DESCRIPTION - PAIN TYPE
TYPE: ACUTE PAIN

## 2025-06-14 ASSESSMENT — FIBROSIS 4 INDEX: FIB4 SCORE: 2.91

## 2025-06-14 ASSESSMENT — SOCIAL DETERMINANTS OF HEALTH (SDOH)
IN THE PAST 12 MONTHS, HAS THE ELECTRIC, GAS, OIL, OR WATER COMPANY THREATENED TO SHUT OFF SERVICE IN YOUR HOME?: NO
WITHIN THE PAST 12 MONTHS, YOU WORRIED THAT YOUR FOOD WOULD RUN OUT BEFORE YOU GOT THE MONEY TO BUY MORE: NEVER TRUE
WITHIN THE PAST 12 MONTHS, THE FOOD YOU BOUGHT JUST DIDN'T LAST AND YOU DIDN'T HAVE MONEY TO GET MORE: NEVER TRUE

## 2025-06-14 NOTE — PROGRESS NOTES
4 Eyes Skin Assessment Completed by OLVIN Lan and OLVIN Rice.    Skin assessment is primarily focused on high risk bony prominences. Pay special attention to skin beneath and around medical devices, high risk bony prominences, skin to skin areas and areas where the patient lacks sensation to feel pain and areas where the patient previously had breakdown.     Head (Occipital):  WDL   Ears (Under Medical Devices): WDL   Nose (Under Medical Devices): WDL   Mouth:  WDL   Neck: WDL   Breast/Chest:  WDL   Shoulder Blades:  WDL   Spine:   WDL   (R) Arm/Elbow/Hand: WDL   (L) Arm/Elbow/Hand: WDL   Abdomen: WDL   Pannus/Groin:  WDL   Sacrum/Coccyx:   WDL   (R) Ischial Tuberosity (Sit Bones):  WDL   (L) Ischial Tuberosity (Sit Bones):  WDL   (R) Leg:  WDL   (L) Leg:  WDL   (R) Heel:  WDL   (R) Foot/Toe: WDL   (L) Heel: WDL   (L) Foot/Toe:  WDL       DEVICES IN USE:   Respiratory Devices:  NA, patient on room air  Feeding Devices:  N/A   Lines & BP Monitoring Devices:  Peripheral IV, BP cuff, and Pulse ox    Orthopedic Devices:  N/A  Miscellaneous Devices:  N/A    PROTOCOL INTERVENTIONS:   ICU Low Airlo Bed:  Already in place    WOUND PHOTOS:   N/A no wounds identified    WOUND CONSULT:   N/A, no advanced wound care needs identified

## 2025-06-14 NOTE — PROGRESS NOTES
Accepted to [facility] stepdown for periodic bradycardia to 30s with associated pauses per ERP, reportedly not symptomatic with these. [facility] is not consulting but available if needed.     Sreekanth Tejada M.D.

## 2025-06-14 NOTE — PROGRESS NOTES
Monitor Summary    Rhythm: SB/ST 40s-100s w/occasional Bigeminy PVCs.   Measurement: 0.29/0.10/0.40

## 2025-06-14 NOTE — ASSESSMENT & PLAN NOTE
Possibly related to Bystolic use  Reported to have heart rate 30s at [facility] ER    Avoid AV neelam blocking agents  Currently HR>40  Calcium gluconate 1 g for beta-blocker reversal  Discontinued Bystolic    Target potassium above 4, magnesium above 2  Trend CE, EKG  Check TTE    ICU consulted, recommended IMCU admission    May consider cardiology consult in a.m. for possible pacemaker placement if persistent bradycardia  Admit to IMCU for close hemodynamic monitoring

## 2025-06-14 NOTE — THERAPY
Speech Language Therapy Contact Note    Patient Name: Checo Hughes  Age:  75 y.o., Sex:  male  Medical Record #: [id number]  Today's Date: 6/14/2025    Discussed missed therapy with RN       06/14/25 6829   Initial Contact Note    Initial Contact Note  Order Received and Verified. Speech Therapy Evaluation NOT Completed Because Patient Does Not Require Acute Speech Therapy at this Time.   Interdisciplinary Plan of Care Collaboration   IDT Collaboration with  Nursing   Collaboration Comments Orders received for CSE and cognitive evaluations. Per RN, evals not warranted, as no deficits are noted in either area. SLP will cancel orders. Please re-consult w/ any changes in status or concerns. Thank you.

## 2025-06-14 NOTE — CONSULTS
Neurology STROKE H&P  Vascular Neurology Service, [facility]    Referring Physician: Kevin Fournier D.O.    STROKE CODE: Gait disturbance and imbalance    To obtain the most accurate data regarding the time called, and time patient seen, refer to the stroke run-sheet and chart.  For time of CT, refer to the radiology report. See A&P below for TPA Decision and door to needle time if and when applicable.    HPI: Checo Hughes is a 75 y.o. male with a past medical history of hypertension, hyperlipidemia, and hypothyroidism who presented on 06/13/2025 as a transfer from [facility] with a right vertebral artery occlusion and symptomatic bradycardia. The patient reports feeling imbalance and list to the right on ambulation earlier in the day and presented to  for evaluation. A noncontrast CT head was negative for acute findings. CTA head and neck demonstrated a right vertebral artery occlusion near the vertebrobasilar junction. The patient was transferred here to [facility] for further work up. MRI brain was negative for acute infarct and hemorrhage. Neurology has been consulted for further evaluation of the above.     Review of systems: In addition to what is detailed in the HPI above, all other systems reviewed and are negative.    Past Medical History:    has a past medical history of Allergic rhinitis (5/1/2017), B12 deficiency (5/29/2018), BPH with obstruction/lower urinary tract symptoms (11/7/2012), Diverticulosis of colon (10/8/2013), Elevated blood pressure reading without diagnosis of hypertension, Eustachian tube dysfunction, Hyperlipidemia, Hypertension, and Hypothyroid (12/8/2014).    FHx:  family history includes Heart Disease (age of onset: 65) in his brother and father.    SHx:   reports that he has never smoked. He has never used smokeless tobacco. He reports current alcohol use. He reports that he does not use  drugs.    Allergies:  Allergies[1]    Medications:  Current Medications[2]    Physical Examination:    Vitals:    06/14/25 0300 06/14/25 0400 06/14/25 0500 06/14/25 0600   BP: (!) 140/66  (!) 150/73 134/63   Pulse: (!) 52 (!) 51 (!) 53 80   Resp: 19 20 18 18   Temp:  36.8 °C (98.2 °F)     TempSrc:  Temporal     SpO2: 93% 93% 96% 89%   Weight:           General: Patient is awake and in no acute distress  Eye: Examination of optic disks not indicated at this time given acuity of consult  Neck: There is normal range of motion  CV: Regular rate   Extremities:  Clear, dry, intact, without peripheral edema    NEUROLOGICAL EXAM:     Mental status: Awake, alert and fully oriented  Speech and language: Speech is clear and fluent. The patient is able to name and repeat, and follow commands  Cranial nerve exam: Pupils are equal, round and reactive to light bilaterally. Visual fields are full. There is no nystagmus. Extraocular muscles are intact. Face is symmetric. Sensation in the face is intact to light touch. Palate elevates symmetrically. Tongue is midline.  Motor exam: There is sustained antigravity with no downward drift in bilateral arms and legs.  There is no pronator drift.  Tone is normal. No abnormal movements were seen on exam.  Sensory exam:  Reacts to tactile in all 4 distal extremities, there is no neglect to double stim.  Deep tendon reflexes:  2+ throughout. Toes down-going bilaterally.  Coordination: No ataxia on bilateral finger-to-nose testing.  Gait: Deferred due to patient preference.    NIHSS: National Institutes of Health Stroke Scale    [0] 1a:Level of Consciousness    0-alert 1-drowsy   2-stupor   3-coma  [0] 1b:LOC Questions                  0-both  1-one      2-neither  [0] 1c:LOC Commands                   0-both  1-one      2-neither  [0] 2: Best Gaze                     0-nl    1-partial  2-forced  [0] 3: Visual Fields                   0-nl    1-partial  2-complete 3-bilat  [0] 4: Facial Paresis                 0-nl    1-minor    2-partial  3-full  MOTOR                       0-nl  [0] 5: Right Arm           1-drift  [0] 6: Left Arm             2-some effort vs gravity  [0] 7: Right Leg           3-no effort vs gravity  [0] 8: Left Leg             4-no movement                             x-untestable  [0] 9: Limb Ataxia                    0-abs   1-1_limb   2-2+_limbs       x-untestable  [0] 10:Sensory                        0-nl    1-partial  2-dense  [0] 11:Best Language/Aphasia         0-nl    1-mild/mod 2-severe   3-mute  [0] 12:Dysarthria                     0-nl    1-mild/mod 2-severe       x-untestable  [0] 13:Neglect/Inattention            0-none  1-partial  2-complete  [0] TOTAL    NIHSS Date/Time: 06/14/2025 @ 0730    Baseline Modified Woodruff Scale (MRS): 0 = No symptoms    Objective Data:    Labs:  Lab Results   Component Value Date/Time    PROTHROMBTM 14.4 06/13/2025 06:48 PM    INR 1.08 06/13/2025 06:48 PM      Lab Results   Component Value Date/Time    WBC 5.7 06/14/2025 05:24 AM    WBC 5.3 11/14/2012 07:10 AM    RBC 4.05 (L) 06/14/2025 05:24 AM    RBC 5.09 11/14/2012 07:10 AM    HEMOGLOBIN 13.4 (L) 06/14/2025 05:24 AM    HEMATOCRIT 39.4 (L) 06/14/2025 05:24 AM    MCV 97.3 06/14/2025 05:24 AM    MCV 86 11/14/2012 07:10 AM    MCH 33.1 (H) 06/14/2025 05:24 AM    MCH 28.1 11/14/2012 07:10 AM    MCHC 34.0 06/14/2025 05:24 AM    MPV 10.9 06/14/2025 05:24 AM    NEUTSPOLYS 66.70 06/14/2025 05:24 AM    LYMPHOCYTES 22.30 06/14/2025 05:24 AM    MONOCYTES 8.60 06/14/2025 05:24 AM    EOSINOPHILS 1.90 06/14/2025 05:24 AM    BASOPHILS 0.30 06/14/2025 05:24 AM      Lab Results   Component Value Date/Time    SODIUM 138 06/14/2025 05:24 AM    POTASSIUM 3.9 06/14/2025 05:24 AM    CHLORIDE 107 06/14/2025 05:24 AM    CO2 22 06/14/2025 05:24 AM    GLUCOSE 92 06/14/2025 05:24 AM    BUN 25 (H) 06/14/2025 05:24 AM    CREATININE 1.01 06/14/2025 05:24 AM    CREATININE 1.11 11/14/2012 07:10 AM    BUNCREATRAT 25 (H)  09/17/2013 12:00 AM    BUNCREATRAT 21 11/14/2012 07:10 AM      Lab Results   Component Value Date/Time    CHOLSTRLTOT 101 06/14/2025 05:24 AM    LDL 50 06/14/2025 05:24 AM    HDL 38 (A) 06/14/2025 05:24 AM    TRIGLYCERIDE 66 06/14/2025 05:24 AM       Lab Results   Component Value Date/Time    ALKPHOSPHAT 41 06/14/2025 05:24 AM    ASTSGOT 34 06/14/2025 05:24 AM    ALTSGPT 32 06/14/2025 05:24 AM    TBILIRUBIN 1.0 06/14/2025 05:24 AM        Imaging/Testing:    I interpreted and/or reviewed the patient's neuroimaging    MR-BRAIN-W/O    (Results Pending)   EC-ECHOCARDIOGRAM COMPLETE W/O CONT    (Results Pending)   US-RENAL    (Results Pending)       Assessment and Plan:    75 y.o. M presenting from [facility] with symptomatic bradycardia and an acute occlusion of the right vertebral artery. Patient reports felling off balance earlier in the day which prompted him to present to . There a NCCTH was negative for acute intracranial abnormalities. CTA head and neck demonstrated an acute occlusion of the right vertebral artery near the vertebrobasilar junction. The patient was transferred here for further evaluation, An MRI brain was obtained overnight which is negative for acute infarct and hemorrhage, with no other intracranial abnormalities appreciated as well. With low atherosclerotic burden appreciated throughout the CTA head and neck, and evaluating the patient's echocardiogram from 02/04/2025 which shows an enlarged left atrium, it is reasonable to start empiric anticoagulation with a NOAC. Defer repeat TTE at this time. The right vertebral artery occlusion is not amenable to endovascular clot retrieval due to high risk of clot propagating distally that could compromise the basilar artery. With the likely thrombus situated just before the vertebrobasilar junction there is a small amount of retrograde blood flow from the left vertebral artery providing a tamponade effect and stabilizing the clot from distal  propagation.     Problem list:  Right vertebral artery occlusion  Symptomatic bradycardia    Recommendations:  - q4h neuro checks  - vital sign per unit/nursing policy  - blood pressure goal normotension 110-130/60-80. Antihypertensives per primary team  - Labs: LDL 50, HgbA1c 5.6  - telemetry. Can defer TTE, recommend Zio patch on discharge  - recommend discontinuing ASA 81mg daily  - recommend starting NOAC empirically with Eliquis 5mg BID  - continue home dose Crestor 5mg qhs, and Zetia 10mg daily. Presently at goal of LDL < 70  - FSBS goal  while admitted  - DVT: SCDs, recommend NOAC  - patient can follow up in [facility] upon discharge.      Case reviewed and plan created with Dr. Ar De La Paz, Vascular Neurology. Neurology will sign off at this time. Please call with any questions.      Carlos LE  Vascular Neurology, [facility]  [phone]           [1] No Known Allergies  [2]   Current Facility-Administered Medications:     acetaminophen (Tylenol) tablet 650 mg, 650 mg, Oral, Q6HRS PRN, Raudel Morales M.D.    hydrALAZINE (Apresoline) injection 10 mg, 10 mg, Intravenous, Q4HRS PRN, Raudel Morales M.D.    ondansetron (Zofran) syringe/vial injection 4 mg, 4 mg, Intravenous, Q4HRS PRN **OR** ondansetron (Zofran ODT) dispertab 4 mg, 4 mg, Oral, Q4HRS PRN, Raudel Morales M.D.    senna-docusate (Pericolace Or Senokot S) 8.6-50 MG per tablet 2 Tablet, 2 Tablet, Oral, Q EVENING **AND** polyethylene glycol/lytes (Miralax) Packet 1 Packet, 1 Packet, Oral, QDAY PRN, Raudel Morales M.D.    LR (Bolus) infusion 500 mL, 500 mL, Intravenous, Once PRN, Raudel Morales M.D.    ipratropium-albuterol (DUONEB) nebulizer solution, 3 mL, Nebulization, Q4H PRN (RT), Raudel Morales M.D.    ezetimibe (Zetia) tablet 10 mg, 10 mg, Oral, DAILY, Raudel Morales M.D.    levothyroxine (Synthroid) tablet 75 mcg, 75 mcg, Oral, AM ES, Raudel Morales M.D., 75 mcg at 06/14/25 0521    [START ON 6/15/2025]  rosuvastatin (Crestor) tablet 5 mg, 5 mg, Oral, Q48HRS, Raudel Morales M.D.    aspirin EC tablet 81 mg, 81 mg, Oral, DAILY, Raudel Morales M.D.    NS infusion, , Intravenous, Continuous, Raudel Morales M.D., Last Rate: 100 mL/hr at 06/14/25 0024, New Bag at 06/14/25 0024

## 2025-06-14 NOTE — ASSESSMENT & PLAN NOTE
Reported having off balance to the right side    CT head: Hypodensity in the intradural right vertebral artery could be related to thrombus and/or atherosclerosis, no acute intracranial hemorrhage  CTA head: Acute appearing occlusion of right vertebral artery  CTA neck: Occlusion of the distal cervical right vertebral artery, atherosclerosis without significant stenosis of carotid arteries  CT perfusion artifact    Transferred from [facility] to [facility] for further stroke evaluation  Aspirin, statin for now  PT/OT/ST  Check TTE  Check MRI brain  May need Zio patch    Neurology consulted, formal  evaluation to follow in a.m.

## 2025-06-14 NOTE — PROGRESS NOTES
Ziopatch applied on left side chest of patient. Instructions, manual, and box given to patient. Patient verbalized understanding of product.     Product #: [id number]

## 2025-06-14 NOTE — DISCHARGE PLANNING
[facility]    Referral from:  Dr. Morales  Insurance Provider on Facesheet:  Medicare/  Potential Rehab diagnosis:  Stroke/Cardiac    Chart review indicates patient has ongoing medical management and therapy needs to possibly meet inpatient rehab facility criteria with the goal of returning to community.      D/C Support:  Spouse    Physiatry consult pended, waiting for additional information.   R vertebral artery occlusion.   MR brain pending.  PT/OT pending as clinically appropriate.  TCC will follow.  Please reach out sooner if PMR consult requested for medical management.     Last Covid test:    Thank you for the referral.

## 2025-06-14 NOTE — ASSESSMENT & PLAN NOTE
Cr 1.22  Did receive contrast for CTA imaging  minimize nephrotoxic agents, renally dose meds  IVF  Check FeNa, renal US

## 2025-06-14 NOTE — DISCHARGE INSTRUCTIONS
Discharge Instructions    Discharged to home by car with relative. Discharged via wheelchair, hospital escort: Yes.  Special equipment needed: Zio patch    Be sure to schedule a follow-up appointment with your primary care doctor or any specialists as instructed.     Discharge Plan:   Diet Plan: Discussed  Activity Level: Discussed  Confirmed Follow up Appointment: Appointment Scheduled  Confirmed Symptoms Management: Discussed  Medication Reconciliation Updated: Yes    I understand that a diet low in cholesterol, fat, and sodium is recommended for good health. Unless I have been given specific instructions below for another diet, I accept this instruction as my diet prescription.   Other diet: regular    Special Instructions:     Stroke/CVA/TIA/Hemorrhagic Ischemia Discharge Instructions  You have had a stroke. Your risk factors have been identified as follows:  Age - Over 55  Gender - Men are at a higher risk than women  High blood pressure  Other: right vertebral artery occlusion   It is important that you reduce your risk factors to avoid another stroke in the future. Here are some general guidelines to follow:  Eat healthy - avoid food high in fat.  Get regular exercise.  Maintain a healthy weight.  Avoid smoking.  Avoid alcohol and illegal drug use.  Take your medications as directed.  For more information regarding risk factors, refer to pages 17-19 in your Stroke Patient Education Guide. Stroke Education Guide was given to patient and family member.    Warning signs of a stroke include (which can also be found on page 3 of your Stroke Patient Education Guide):  Sudden numbness of weakness of the face, arm or leg (especially on one side of the body).  Sudden confusion, trouble speaking or understanding.  Sudden trouble seeing in one or both eyes.  Sudden trouble walking, dizziness, loss of balance or coordination.  Sudden severe headache with no known cause.  It is very important to get treatment quickly when  "a stroke occurs. If you experience any of the above warning signs, call [phone] immediately.     Some patients who have had a stroke will be going home on a blood thinner medication called Warfarin (Coumadin).  This medication requires very close monitoring and follow up.  This follow up can be provided by either your Primary Care Physician or by [facility] Outpatient Anticoagulation Service.  The Outpatient Anticoagulation Service is located at the [facility] at [facility] ([facility]).  If you do not know when your follow up appointment is scheduled, call [phone] to verify your appointment time.    -Is this patient being discharged with medication to prevent blood clots?  No    Is patient discharged on Warfarin / Coumadin?   No       Ischemic Stroke  Discharge Instructions    You experienced an Ischemic Stroke.  Ischemic stroke is the most common type of stroke and happens when an artery in the brain becomes blocked by a plaque fragment or blood clot. Typically, these blockages travel from the heart or larger arteries that supply the brain.  The brain needs a constant supply of blood, which carries oxygen and nutrients it needs to function.  A stroke occurs when one of these arteries to the brain is either blocked or bursts. As a result, part of the brain does not get the blood it needs, so it starts to die.         Stroke Risk Factors    You are at increased risk of having another stroke event.  See your Patient Stroke Guide to help reduce your stroke risk. These are your specific risk factors:  Age - Over 55  Artery Disease / Peripheral Vascular Disease   Gender - Men are at a higher risk than women  High blood pressure  High Cholesterol and lipids     Get help right away if you have any signs of a stroke.  \"BE FAST\" is an easy way to remember the main warning signs of a stroke:  B - Balance. Dizziness, sudden trouble walking, or loss of balance.  E - Eyes. " Trouble seeing or a change in how you see.  F - Face. Sudden weakness or loss of feeling in the face. The face or eyelid may droop on one side.  A - Arms. Weakness or loss of feeling in an arm. This happens all of a sudden and most often on one side of the body.  S - Speech. Sudden trouble speaking, slurred speech, or trouble understanding what people say.  T - Time. Time to call emergency services. Write down what time symptoms started.

## 2025-06-14 NOTE — CARE PLAN
The patient is Watcher - Medium risk of patient condition declining or worsening    Shift Goals  Clinical Goals: MRI  Patient Goals: Get better  Family Goals: Updates on POC    Problem: Hemodynamics  Goal: Patient's hemodynamics, fluid balance and neurologic status will be stable or improve  Description: Target End Date:  Prior to discharge or change in level of care    Document on Assessment and I/O flowsheet templates    1.  Monitor vital signs, pulse oximetry and cardiac monitor per provider order and/or policy  2.  Maintain blood pressure per provider order  3.  Hemodynamic monitoring per provider order  4.  Manage IV fluids and IV infusions  5.  Monitor intake and output  6.  Daily weights per unit policy or provider order  7.  Assess peripheral pulses and capillary refill  8.  Assess color and body temperature  9.  Position patient for maximum circulation/cardiac output  10. Monitor for signs/symptoms of excessive bleeding  11. Assess mental status, restlessness and changes in level of consciousness  12. Monitor temperature and report fever or hypothermia to provider immediately. Consideration of targeted temperature management.  Outcome: Progressing

## 2025-06-14 NOTE — ED TRIAGE NOTES
"Chief Complaint   Patient presents with    Possible Stroke     Feels like he is being pulled down on the right side with ride sided face numbness and dizziness that started at 1400        Pts heart rate was palpated at 30 but through out triage noted on pulse ox heart rate jumped to 115 and was palpated at a fast rate.     BP (!) 156/63   Pulse (!) 30   Temp 36.2 °C (97.2 °F) (Temporal)   Resp 16   Ht 1.905 m (6' 3\")   Wt 102 kg (225 lb 1.4 oz)   SpO2 95%   BMI 28.13 kg/m²     "

## 2025-06-14 NOTE — ED PROVIDER NOTES
"ED PHYSICIAN NOTE    CHIEF COMPLAINT  Chief Complaint   Patient presents with    Possible Stroke     Feels like he is being pulled down on the right side with ride sided face numbness and dizziness that started at 1400          HPI/ROS      Checo Hughes is a 75 y.o. male who presents feeling like he is being pulled down on the right side.  When he is walking he lists to the right drifting feeling off balance.  This started at 230 this afternoon.  Over the last hour he has felt some numbness around his right eye.  He denies having a headache.  No vision change.  No slurred speech confusion weakness or numbness elsewhere.  Has never had anything like this before.     PAST MEDICAL HISTORY  Past Medical History[1]    SOCIAL HISTORY  Social History[2]    CURRENT MEDICATIONS  Home Medications       Reviewed by Nneka Jordan R.N. (Registered Nurse) on 06/13/25 at 1843  Med List Status: Not Addressed     Medication Last Dose Status   acetaminophen (TYLENOL) 500 MG Tab  Active   aspirin 81 MG EC tablet  Active   azelastine (ASTELIN) 137 MCG/SPRAY nasal spray  Active   b complex vitamins tablet  Active   Cholecalciferol (D3 PO)  Active   COENZYME Q10 PO  Active   ezetimibe (ZETIA) 10 MG Tab  Active   levothyroxine (SYNTHROID) 75 MCG Tab  Active   nebivolol (BYSTOLIC) 5 MG Tab tablet  Active   nitroglycerin (NITROSTAT) 0.4 MG SL Tab  Active   rosuvastatin (CRESTOR) 5 MG Tab  Active   tadalafil (CIALIS) 5 MG tablet  Active                  Audit from Redirected Encounters    **Home medications have not yet been reviewed for this encounter**         ALLERGIES  Allergies[3]    PHYSICAL EXAM  VITAL SIGNS: BP (!) 162/91   Pulse (!) 50   Temp 36.2 °C (97.2 °F) (Temporal)   Resp 16   Ht 1.905 m (6' 3\")   Wt 102 kg (225 lb 1.4 oz)   SpO2 97%   BMI 28.13 kg/m²    Constitutional: Awake and alert  HENT: Normal inspection  Eyes: Normal inspection  Neck: Grossly normal range of motion.  Cardiovascular: Normal heart " rate, Normal rhythm.  Symmetric peripheral pulses.   Thorax & Lungs: No respiratory distress, No wheezing, No rales, No rhonchi, No chest tenderness.   Abdomen: Bowel sounds normal, soft, non-distended, nontender, no mass  Skin: No rash.  Extremities: No clubbing, cyanosis, edema, no Homans or cords.  Neurologic: STROKE:   Time seen on arrival (Time)     National Institutes of Health (NIH) Stroke Scale   NIH Stroke Scale    Level of Consciousness: Alert, Keenly Responsive  Ask Month and Age: Both Questions Right  Blink Eyes and Squeeze Hands: Performs Both Tasks  Best Gaze: Normal  Visual: No Visual Loss  Facial Palsy: Normal Symmetrical Movements  Motor, Left Arm: No Drift  Motor, Right Arm: No Drift  Motor, Left Leg: No Drift  Motor, Right Leg: No Drift  Limb Ataxia: Absent  Sensory Loss: Mild-to-Moderate Sensory Loss  Best Language: No Aphasia  Dysarthria: Normal  Extinction and Inattention: No Abnormality    NIHSS Score: 1    No, this patient is not a candidate for thrombolytic therapy because presents out of time window    DIAGNOSTIC STUDIES / PROCEDURES  LABS/EKG  Results for orders placed or performed during the hospital encounter of 06/13/25   POCT glucose device results    Collection Time: 06/13/25  6:45 PM   Result Value Ref Range    POC Glucose, Blood 134 (H) 65 - 99 mg/dL   CBC WITH DIFFERENTIAL    Collection Time: 06/13/25  6:48 PM   Result Value Ref Range    WBC 6.9 4.8 - 10.8 K/uL    RBC 4.46 (L) 4.70 - 6.10 M/uL    Hemoglobin 14.8 14.0 - 18.0 g/dL    Hematocrit 43.7 42.0 - 52.0 %    MCV 98.0 (H) 81.4 - 97.8 fL    MCH 33.2 (H) 27.0 - 33.0 pg    MCHC 33.9 32.3 - 36.5 g/dL    RDW 46.7 35.9 - 50.0 fL    Platelet Count 187 164 - 446 K/uL    MPV 11.3 9.0 - 12.9 fL    Neutrophils-Polys 69.70 44.00 - 72.00 %    Lymphocytes 19.80 (L) 22.00 - 41.00 %    Monocytes 7.90 0.00 - 13.40 %    Eosinophils 1.90 0.00 - 6.90 %    Basophils 0.40 0.00 - 1.80 %    Immature Granulocytes 0.30 0.00 - 0.90 %    Nucleated RBC  0.00 0.00 - 0.20 /100 WBC    Neutrophils (Absolute) 4.78 1.82 - 7.42 K/uL    Lymphs (Absolute) 1.36 1.00 - 4.80 K/uL    Monos (Absolute) 0.54 0.00 - 0.85 K/uL    Eos (Absolute) 0.13 0.00 - 0.51 K/uL    Baso (Absolute) 0.03 0.00 - 0.12 K/uL    Immature Granulocytes (abs) 0.02 0.00 - 0.11 K/uL    NRBC (Absolute) 0.00 K/uL   COMP METABOLIC PANEL    Collection Time: 25  6:48 PM   Result Value Ref Range    Sodium 139 135 - 145 mmol/L    Potassium 4.2 3.6 - 5.5 mmol/L    Chloride 105 96 - 112 mmol/L    Co2 21 20 - 33 mmol/L    Anion Gap 13.0 7.0 - 16.0    Glucose 134 (H) 65 - 99 mg/dL    Bun 29 (H) 8 - 22 mg/dL    Creatinine 1.22 0.50 - 1.40 mg/dL    Calcium 9.1 8.5 - 10.5 mg/dL    Correct Calcium 8.9 8.5 - 10.5 mg/dL    AST(SGOT) 39 12 - 45 U/L    ALT(SGPT) 40 2 - 50 U/L    Alkaline Phosphatase 49 30 - 99 U/L    Total Bilirubin 0.8 0.1 - 1.5 mg/dL    Albumin 4.2 3.2 - 4.9 g/dL    Total Protein 7.1 6.0 - 8.2 g/dL    Globulin 2.9 1.9 - 3.5 g/dL    A-G Ratio 1.4 g/dL   PROTHROMBIN TIME    Collection Time: 25  6:48 PM   Result Value Ref Range    PT 14.4 12.0 - 14.6 sec    INR 1.08 0.87 - 1.13   APTT    Collection Time: 25  6:48 PM   Result Value Ref Range    APTT 25.9 24.7 - 36.0 sec   TROPONIN    Collection Time: 25  6:48 PM   Result Value Ref Range    Troponin T 33 (H) 6 - 19 ng/L   ESTIMATED GFR    Collection Time: 25  6:48 PM   Result Value Ref Range    GFR (CKD-EPI) 63 >60 mL/min/1.73 m 2   EKG (NOW)    Collection Time: 25  7:55 PM   Result Value Ref Range    Report       [facility] Emergency Dept.    Test Date:  2025  Pt Name:    VIKY BIRMINGHAM           Department: [facility]  MRN:        [id number]                      Room:       [facility] 8  Gender:     Male                         Technician: [id number]  :        1950                   Requested By:ER TRIAGE PROTOCOL  Order #:    [id number]                    Reading MD: CARIN WYMAN,  MD    Measurements  Intervals                                Axis  Rate:       107                          P:          200  IL:         169                          QRS:        -22  QRSD:       114                          T:          25  QT:         368  QTc:        491    Interpretive Statements  Sinus or ectopic atrial tachycardia  Multiple ventricular premature complexes  Probable inferior infarct, old  Compared to ECG 02/04/2025 21:17:30  Ventricular premature complex(es) now present  Sinus bradycardia no longer present  First degree AV block no longer present  Myocardial infarct f inding still present  Electronically Signed On 06- 19:55:46 PDT by CARIN WYMAN MD          RADIOLOGY  CT-CTA NECK WITH & W/O-POST PROCESSING   Final Result      1.  Occlusion of the distal cervical right vertebral artery.   2.  Atherosclerosis without significant stenosis of the carotid arteries.      CT-CEREBRAL PERFUSION ANALYSIS   Final Result      1. Cerebral blood flow less than 30% possibly representing completed infarct = 0 mL. Based on distribution of this finding, this is unlikely to represent artifact.      2. T Max more than 6 seconds possibly representing combination of completed infarct and ischemia = 0 mL. Based on the distribution of this finding, this is possibly artifact.      3. Mismatched volume possibly representing ischemic brain/penumbra= 0 mL      4.  Please note that this cerebral perfusion study and report is Quantitative and targets supratentorial (cerebral) perfusion for evaluation of large vessel territory acute ischemia/infarction. For example, lacunar infarcts, and brainstem/posterior fossa    ischemia/infarction are not evaluated on this study.  Data acquisition is subject to artifacts which can yield non-anatomically plausible perfusion maps which may be due to motion, bolus timing, signal to noise ratio, or other technical factors.    Perfusion map abnormalities which show non-anatomic  "distributions are likely artifact.   This study is not \"stand-alone\" and should only be utilized for diagnosis, management/treatment in correlation with CT, CTA, and/or MRI and clinical factors.         CT-CTA HEAD WITH & W/O-POST PROCESS   Final Result      1.  Acute appearing occlusion of the right vertebral artery.      These findings were messaged with CARIN WYMAN on 6/13/2025 7:15 PM.      CT-HEAD W/O   Final Result      1.  Hyperdensity in the intradural right vertebral artery could be related to thrombus and/or atherosclerosis. There is a concurrent CT angiogram   2.  Chronic microvascular ischemic type changes.   3.  No acute intracranial hemorrhage.               DX-CHEST-PORTABLE (1 VIEW)    (Results Pending)         COURSE & MEDICAL DECISION MAKING    INITIAL ASSESSMENT, COURSE AND PLAN  Case narrative: Patient presents with strokelike symptoms.  Describes listing to the right.  Some tingling over the right face.  Thankfully neurologic exam is relatively reassuring.  Emergency CT imaging was ordered.    Patient was noted to have intermittent bradycardia with heart rate into the 30s.  EKG does not show heart block and rate normalized.  Patient has frequent PVCs and intermittent tachycardia.  Kept on cardiac monitor.    CT does not show hemorrhage.    CTA shows vertebral artery occlusion.  Patient reassessed.  No change in neurologic status.  Paged neurology.    Via RTOC discussed with Dr. De La Paz.  He had already reviewed images as well as discussed with neurointerventional.  Patient is not a candidate for thrombolysis.  Not a candidate for mechanical thrombectomy.    Patient will be transferred [facility] to [facility] for further care.  I discussed case with the patient's primary doctor, Dr. Quintero.    Spoke with family about findings and plan.    Patient would intermittently have a heart rate down into the 40s on cardiac monitor with PVCs.  Will discuss with the intensivist as well.    Discussed with the " intensivist, Dr. Tejada.  Excepted case to [facility].    Patient's symptoms remain stable.        Interventions  Medications   iohexol (OMNIPAQUE) 350 mg/mL (IV) (100 mL Intravenous Given 6/13/25 1915)         DISPOSITION AND DISCUSSIONS  I have discussed management of the patient with the following physicians and PARTH's:  as noted above      FINAL IMPRESSION  1.  Acute right vertebral artery thrombotic CVA  2.  Intermittent bradycardia    CRITICAL CARE  The very real possibilty of a deterioration of this patient's condition required the highest level of my preparedness for sudden, emergent intervention.  I provided critical care services, which included medication orders, frequent reevaluations of the patient's condition and response to treatment, ordering and reviewing test results, and discussing the case with various consultants.  The critical care time associated with the care of the patient was stable minutes. Review chart for interventions. This time is exclusive of any other billable procedures.       This dictation was created using voice recognition software. The accuracy of the dictation is limited to the abilities of the software. I expect there may be some errors of grammar and possibly content. The nursing notes were reviewed and certain aspects of this information were incorporated into this note.    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 2013: Pandiverticulosis but predominantly left sided    Elevated blood pressure reading without diagnosis of hypertension     Eustachian tube dysfunction     Hyperlipidemia     Hypertension     Hypothyroid 12/8/2014   [2]   Social History  Tobacco Use    Smoking status: Never    Smokeless tobacco: Never   Vaping Use     Vaping status: Never Used   Substance Use Topics    Alcohol use: Yes     Alcohol/week: 0.0 oz     Comment: 1 per day    Drug use: No   [3] No Known Allergies

## 2025-06-14 NOTE — H&P
Hospital Medicine History & Physical Note    Date of Service  6/13/2025    Primary Care Physician  Trav Cooley M.D.    Consultants  ICU (Dr. Tejada)  - to [facility]  Neurology (Dr. De La Paz)    Code Status  Full Code    Chief Complaint  No chief complaint on file.  Dizziness, weakness    History of Presenting Illness  Checo Hughes is a 75 y.o. male with history of hyperlipidemia, hypertension, hypothyroidism who presented 6/13/2025 as a direct transfer from [facility] emergency room for evaluation of stroke and symptomatic bradycardia.    Patient history of hospitalization for chest pain, had negative stress test in 02/2025.  He has been seen by cardiology, taking Bystolic.    Earlier today, he reported feeling as he was drifting to the right side, feeling off balance.  He stated this occurred approximately 1430.  Ultimately, he presented to [facility] emergency room for evaluation.    Workup from [facility] emergency room included:  CT head: Hypodensity in the intradural right vertebral artery could be related to thrombus and/or atherosclerosis, no acute intracranial hemorrhage  CTA head: Acute appearing occlusion of right vertebral artery  CTA neck: Occlusion of the distal cervical right vertebral artery, atherosclerosis without significant stenosis of carotid arteries  CT perfusion artifact    Case was reviewed with neurology, recommended transferring patient to [facility] for further stroke workup.  While at [facility] emergency room, patient reported after bradycardia with heart rate in the 30s.  ICU was consulted, recommend IMCU admission.  Therefore patient was transferred to [facility] as direct hospitalization.  Patient was seen by me at [facility] arrival, admitted to medicine service for further evaluation and treatment.    I discussed the plan of care with patient, family, bedside RN, charge RN, pharmacy, and critical care and neurology.    Review of Systems  Review of Systems    Constitutional:  Negative for chills and fever.   HENT:  Negative for hearing loss and tinnitus.    Eyes:  Negative for blurred vision and double vision.   Respiratory:  Negative for cough and shortness of breath.    Cardiovascular:  Negative for chest pain and palpitations.   Gastrointestinal:  Negative for abdominal pain, heartburn, nausea and vomiting.   Genitourinary:  Negative for dysuria and urgency.   Musculoskeletal:  Negative for joint pain and myalgias.   Skin:  Negative for itching and rash.   Neurological:  Positive for dizziness and weakness. Negative for tingling, tremors, sensory change, speech change, focal weakness, seizures, loss of consciousness and headaches.   Endo/Heme/Allergies:  Negative for environmental allergies. Does not bruise/bleed easily.   Psychiatric/Behavioral:  Negative for depression and substance abuse.    All other systems reviewed and are negative.      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    Surgical History   has a past surgical history that includes thoracotomy (2004); turp-vapor (01/2016); and rhinoplasty.     Family History  Family History   Problem Relation Age of Onset    Heart Disease Father 65        ? valvular heart disease    Heart Disease Brother 65        Family history reviewed with patient. There is family history that is pertinent to the chief complaint.     Social History   reports that he has never smoked. He has never used smokeless tobacco. He reports current alcohol use. He reports that he does not use drugs.    Allergies  Allergies[1]    Medications  Prior to Admission Medications   Prescriptions Last Dose Informant Patient  Reported? Taking?   COENZYME Q10 PO  Patient Yes No   Sig: Take 1 Capsule by mouth every day.   Cholecalciferol (D3 PO)  Patient Yes No   Sig: Take 1 Capsule by mouth every day.   acetaminophen (TYLENOL) 500 MG Tab  Patient Yes No   Sig: Take 1,000 mg by mouth every 6 hours as needed. Indications: Pain   aspirin 81 MG EC tablet   No No   Sig: Take 1 Tablet by mouth every day.   azelastine (ASTELIN) 137 MCG/SPRAY nasal spray  Patient Yes No   Sig: Administer 2 Sprays into affected nostril(S) 2 times a day as needed (For congestion).   b complex vitamins tablet  Patient Yes No   Sig: Take 1 Tab by mouth every day. Indications: 100 mg   ezetimibe (ZETIA) 10 MG Tab  Patient No No   Sig: Take 1 Tablet by mouth every day.   levothyroxine (SYNTHROID) 75 MCG Tab  Patient No No   Sig: Take 1 Tablet by mouth every morning on an empty stomach.   nebivolol (BYSTOLIC) 5 MG Tab tablet   No No   Sig: Take 1 Tablet by mouth every day.   nitroglycerin (NITROSTAT) 0.4 MG SL Tab   No No   Sig: Place 1 Tablet under the tongue as needed for Chest Pain. Can take up to 3 doses every 5 minutes , do not exceed more than 3 doses, if chest pain persists, call 911   rosuvastatin (CRESTOR) 5 MG Tab  Patient No No   Sig: Take 1 Tablet by mouth every 48 hours.   tadalafil (CIALIS) 5 MG tablet  Patient No No   Sig: Take 1 Tablet by mouth every day. Do not take concurrently with tamsulosin.   Patient taking differently: Take 5 mg by mouth 1 time a day as needed. Indications: Benign Enlargement of Prostate      Facility-Administered Medications: None       Physical Exam  Temp:  [36.2 °C (97.2 °F)-36.8 °C (98.2 °F)] 36.8 °C (98.2 °F)  Pulse:  [30-53] 50  Resp:  [16-18] 18  BP: (156-186)/(63-92) 186/92  SpO2:  [95 %-97 %] 95 %      Temperature: 36.8 °C (98.2 °F)   Pulse: (!) 53       Pulse Oximetry: 96 %       Physical Exam  Vitals and nursing note reviewed.   Constitutional:       General: He is not in acute distress.  HENT:      Head: Normocephalic  "and atraumatic.      Nose: Nose normal.      Mouth/Throat:      Mouth: Mucous membranes are moist.      Pharynx: Oropharynx is clear.   Eyes:      General: No scleral icterus.     Extraocular Movements: Extraocular movements intact.   Cardiovascular:      Rate and Rhythm: Regular rhythm. Bradycardia present.      Pulses: Normal pulses.      Heart sounds:      No friction rub.   Pulmonary:      Effort: No respiratory distress.      Breath sounds: No wheezing or rales.   Chest:      Chest wall: No tenderness.   Abdominal:      General: There is no distension.      Tenderness: There is no abdominal tenderness. There is no guarding or rebound.   Musculoskeletal:      Cervical back: Neck supple. No tenderness.      Right lower leg: No edema.      Left lower leg: No edema.   Skin:     General: Skin is warm and dry.   Neurological:      General: No focal deficit present.      Mental Status: He is alert and oriented to person, place, and time.      Sensory: No sensory deficit.      Comments: No facial droop  No dysarthria  No pronator drift   Psychiatric:         Mood and Affect: Mood normal.         Laboratory:  Recent Labs     06/13/25  1848   WBC 6.9   RBC 4.46*   HEMOGLOBIN 14.8   HEMATOCRIT 43.7   MCV 98.0*   MCH 33.2*   MCHC 33.9   RDW 46.7   PLATELETCT 187   MPV 11.3     Recent Labs     06/13/25  1848   SODIUM 139   POTASSIUM 4.2   CHLORIDE 105   CO2 21   GLUCOSE 134*   BUN 29*   CREATININE 1.22   CALCIUM 9.1     Recent Labs     06/13/25  1848   ALTSGPT 40   ASTSGOT 39   ALKPHOSPHAT 49   TBILIRUBIN 0.8   GLUCOSE 134*     Recent Labs     06/13/25  1848   APTT 25.9   INR 1.08     No results for input(s): \"NTPROBNP\" in the last 72 hours.      Recent Labs     06/13/25  1848   TROPONINT 33*       Imaging:  MR-BRAIN-W/O    (Results Pending)   EC-ECHOCARDIOGRAM COMPLETE W/O CONT    (Results Pending)   US-RENAL    (Results Pending)       X-Ray:  I have personally reviewed the images and compared with prior images.  EKG:  I " have personally reviewed the images and compared with prior images.    Assessment/Plan:  Justification for Admission Status  I anticipate this patient will require at least 2 midnights hospitalization, therefore appropriate for inpatient status.      * Symptomatic bradycardia- (present on admission)  Assessment & Plan  Possibly related to Bystolic use  Reported to have heart rate 30s at [facility] ER    Avoid AV neelam blocking agents  Currently HR>40  Calcium gluconate 1 g for beta-blocker reversal  Discontinued Bystolic    Target potassium above 4, magnesium above 2  Trend CE, EKG  Check TTE    ICU consulted, recommended IMCU admission    May consider cardiology consult in a.m. for possible pacemaker placement if persistent bradycardia  Admit to IMCU for close hemodynamic monitoring    Suspected cerebrovascular accident (CVA)- (present on admission)  Assessment & Plan  Reported having off balance to the right side    CT head: Hypodensity in the intradural right vertebral artery could be related to thrombus and/or atherosclerosis, no acute intracranial hemorrhage  CTA head: Acute appearing occlusion of right vertebral artery  CTA neck: Occlusion of the distal cervical right vertebral artery, atherosclerosis without significant stenosis of carotid arteries  CT perfusion artifact    Transferred from [facility] to [facility] for further stroke evaluation  Aspirin, statin for now  PT/OT/ST  Check TTE  Check MRI brain  May need Zio patch    Neurology consulted, formal  evaluation to follow in a.m.    DEVIN (acute kidney injury) (HCC)  Assessment & Plan  Cr 1.22  Did receive contrast for CTA imaging  minimize nephrotoxic agents, renally dose meds  IVF  Check FeNa, renal US      Acquired hypothyroidism- (present on admission)  Assessment & Plan  Continue levothyroxine    Mixed hyperlipidemia with apolipoprotein E3 variant- (present on admission)  Assessment & Plan  Continue Crestor, Zetia  Target LDL below 70 or  as low as tolerated    Hyperglycemia  Assessment & Plan  Check HbA1c        VTE prophylaxis: SCDs/TEDs    Critical care time: 36 minutes spent in chart review, medical management, coordinating care with patient/family/IMCU staff/RN/pharmacy/consulting providers/frequent reevaluation of patient clinical response to treatment         [1] No Known Allergies

## 2025-06-14 NOTE — PROGRESS NOTES
Patient discharged. After visit summary and discharge instructions given. Patient verbalizes understanding of discharge teaching and process. Patient taken with all belongings to spouse's vehicle.

## 2025-06-15 NOTE — DISCHARGE SUMMARY
"Discharge Summary    CHIEF COMPLAINT ON ADMISSION  No chief complaint on file.      Reason for Admission  Stroke (Right Vertebral Artery Occ*     Admission Date  6/13/2025    CODE STATUS  Prior    HPI & HOSPITAL COURSE  This is a 75 y.o. male here with history of dyslipidemia, hypertension, dilated aortic root for which the patient is following with cardiology, hypothyroidism, sleep apnea.  Patient presented to the hospital on June 13 to [facility] after developing right-sided weakness and feeling \"off balance\".  Initial CT showed an occlusion of the intradural right vertebral artery, the rest of the CT imaging was negative.  Patient was also noted to be bradycardic down to the 30s, and of note had recently been started on Bystolic.  He was transferred to [facility] for evaluation by neurology.    Here the patient had an MRI of the brain which demonstrated no acute intracranial abnormalities but did confirm the occlusion of the right vertebral artery.  Cardiac echo demonstrated an EF of 55%, enlarged left atrium, with a dilated aortic root of 4.5 cm no other significant findings.  Lipid panel came back with a LDL of 50, and A1c came back at 5.6.    Clinically the patient did well with essentially complete resolution of his symptoms.  On the day of discharge he was walking down the hallway without support and without complaints.  As regards his bradycardia, he was taken off of his beta-blocker and monitored on telemetry.  He had a catie of a heart rate of 49 while asleep but otherwise had rates in the 50s and 60s with appropriate heart rate response with exertion.    After review of the following results with neurology, their recommendation was to start full dose anticoagulation as they feel the presentation is highly suspicious for cardioembolic etiology.  Case was discussed with interventional radiology, they felt that retrieval of the vertebral artery thrombus, represented quite high risk for distal " embolization and therefore did not recommend intervention.  We will therefore discharge the patient on apixaban, and a Zio patch has been placed.    I had opportunity discussed all of this with neurology on the day of discharge and importantly with his PCP Dr.Quin Cooley.  No notes on file    Therefore, he is discharged in good and stable condition to home with close outpatient follow-up.    The patient recovered much more quickly than anticipated on admission.    Discharge Date  6/14/2025    FOLLOW UP ITEMS POST DISCHARGE  With neurology stroke follow-up clinic, as well as with his PCP Dr. Lopez    DISCHARGE DIAGNOSES  Principal Problem (Resolved):    Symptomatic bradycardia (POA: Yes)  Active Problems:    Mixed hyperlipidemia with apolipoprotein E3 variant (POA: Yes)    Acquired hypothyroidism (POA: Yes)  Resolved Problems:    Suspected cerebrovascular accident (CVA) (POA: Yes)    Hyperglycemia (POA: Unknown)    DEVIN (acute kidney injury) (HCC) (POA: Unknown)      FOLLOW UP  Future Appointments   Date Time Provider Department Center   9/11/2025  9:00 AM Pina Marquez M.D. [facility] None     Trav Cooley M.D.  [address] [id number] [phone]    Call in 1 week(s)        MEDICATIONS ON DISCHARGE     Medication List        START taking these medications        Instructions   apixaban 5mg Tabs  Commonly known as: Eliquis   Take 1 Tablet by mouth 2 times a day.  Dose: 5 mg            CHANGE how you take these medications        Instructions   tadalafil 5 MG tablet  What changed:   when to take this  reasons to take this  additional instructions  Commonly known as: Cialis   Take 1 Tablet by mouth 1 time a day as needed for Erectile Dysfunction. Indications: Benign Enlargement of Prostate  Dose: 5 mg            CONTINUE taking these medications        Instructions   acetaminophen 500 MG Tabs  Commonly known as: Tylenol   Take 1,000 mg by mouth every 6 hours as needed. Indications:  Pain  Dose: 1,000 mg     azelastine 0.1 % nasal spray  Commonly known as: Astelin   Administer 2 Sprays into affected nostril(S) 2 times a day as needed (For congestion).  Dose: 2 Spray     b complex vitamins tablet   Take 1 Tab by mouth every day. Indications: 100 mg  Dose: 1 Tablet     COENZYME Q10 PO   Take 1 Capsule by mouth every day.  Dose: 1 Capsule     D3 PO   Take 1 Capsule by mouth every day.  Dose: 1 Capsule     ezetimibe 10 MG Tabs  Commonly known as: Zetia   Take 1 Tablet by mouth every day.  Dose: 10 mg     levothyroxine 75 MCG Tabs  Commonly known as: Synthroid   Take 1 Tablet by mouth every morning on an empty stomach.  Dose: 75 mcg     nitroglycerin 0.4 MG Subl  Commonly known as: Nitrostat   Place 1 Tablet under the tongue as needed for Chest Pain. Can take up to 3 doses every 5 minutes , do not exceed more than 3 doses, if chest pain persists, call 911  Dose: 0.4 mg     rosuvastatin 5 MG Tabs  Commonly known as: Crestor   Take 1 Tablet by mouth every 48 hours.  Dose: 5 mg            STOP taking these medications      aspirin 81 MG EC tablet     ibuprofen 200 MG Tabs  Commonly known as: Motrin     nebivolol 5 MG Tabs tablet  Commonly known as: Bystolic              Allergies  Allergies[1]    DIET  No orders of the defined types were placed in this encounter.      ACTIVITY  As tolerated.  Weight bearing as tolerated    CONSULTATIONS  Neurology    PROCEDURES  None    LABORATORY  Lab Results   Component Value Date    SODIUM 138 06/14/2025    POTASSIUM 3.9 06/14/2025    CHLORIDE 107 06/14/2025    CO2 22 06/14/2025    GLUCOSE 92 06/14/2025    BUN 25 (H) 06/14/2025    CREATININE 1.01 06/14/2025    CREATININE 1.11 11/14/2012        Lab Results   Component Value Date    WBC 5.7 06/14/2025    WBC 5.3 11/14/2012    HEMOGLOBIN 13.4 (L) 06/14/2025    HEMATOCRIT 39.4 (L) 06/14/2025    PLATELETCT 151 (L) 06/14/2025        Total time of the discharge process exceeds 45 minutes.  I spent most of that time with the  patient on the day of discharge reviewing discharge planning and instructions with particular attention to medications and the initiation of apixaban.  We discussed risks of bleeding, and drug interactions especially with NSAIDs and other issues related to full dose anticoagulation.  On the day of discharge the patient is feeling well and eager to go home       [1] No Known Allergies

## 2025-07-07 ENCOUNTER — TELEPHONE (OUTPATIENT)
Dept: HEALTH INFORMATION MANAGEMENT | Facility: OTHER | Age: 75
End: 2025-07-07
Payer: MEDICARE

## 2025-07-10 ENCOUNTER — RESULTS FOLLOW-UP (OUTPATIENT)
Dept: CARDIOLOGY | Facility: MEDICAL CENTER | Age: 75
End: 2025-07-10
Payer: MEDICARE

## 2025-07-10 ENCOUNTER — TELEPHONE (OUTPATIENT)
Dept: CARDIOLOGY | Facility: MEDICAL CENTER | Age: 75
End: 2025-07-10
Payer: MEDICARE

## 2025-07-10 PROCEDURE — 93228 REMOTE 30 DAY ECG REV/REPORT: CPT | Performed by: INTERNAL MEDICINE

## 2025-07-10 NOTE — TELEPHONE ENCOUNTER
Diabetes Education Visit Reminder Call:  Patient is called at phone # [phone]  Message left on Voice Mail.   Reminded of Appointment on Date 1/4/22, Time 4:00 at Location [facility]  Patient advised to reschedule if recent illness/fever/Covid symptoms for self/household in past 2 weeks.  Advised of clinic mask policy.  Asked patient to attend alone/only patient to come to clinic.  Patient is asked to call [phone] she cannot keep this appointment.         To TT, please read. Thank you!

## 2025-07-10 NOTE — TELEPHONE ENCOUNTER
Geovany AT [facility] Final Report to TT'S nurse Arleen on 7/10/2025  Established patient  Also routed to Neuroscience Zio patch pool

## 2025-08-10 ENCOUNTER — TELEPHONE (OUTPATIENT)
Dept: CARDIOLOGY | Facility: MEDICAL CENTER | Age: 75
End: 2025-08-10
Payer: MEDICARE

## 2025-08-10 DIAGNOSIS — G45.9 TIA (TRANSIENT ISCHEMIC ATTACK): Primary | ICD-10-CM

## 2025-08-11 ENCOUNTER — TELEPHONE (OUTPATIENT)
Dept: CARDIOLOGY | Facility: MEDICAL CENTER | Age: 75
End: 2025-08-11
Payer: MEDICARE

## 2025-08-18 ENCOUNTER — APPOINTMENT (OUTPATIENT)
Dept: ADMISSIONS | Facility: MEDICAL CENTER | Age: 75
End: 2025-08-18
Attending: STUDENT IN AN ORGANIZED HEALTH CARE EDUCATION/TRAINING PROGRAM
Payer: MEDICARE

## 2025-08-20 ENCOUNTER — PRE-ADMISSION TESTING (OUTPATIENT)
Dept: ADMISSIONS | Facility: MEDICAL CENTER | Age: 75
End: 2025-08-20
Attending: STUDENT IN AN ORGANIZED HEALTH CARE EDUCATION/TRAINING PROGRAM
Payer: MEDICARE

## 2025-08-20 VITALS — BODY MASS INDEX: 27.57 KG/M2 | HEIGHT: 74 IN

## 2025-08-20 RX ORDER — RAMIPRIL 2.5 MG/1
2.5 CAPSULE ORAL DAILY
COMMUNITY
Start: 2025-07-03

## 2025-08-20 RX ORDER — ROSUVASTATIN CALCIUM 10 MG/1
10 TABLET, COATED ORAL DAILY
COMMUNITY
Start: 2025-08-03

## 2025-08-20 RX ORDER — LEVOFLOXACIN 500 MG/1
500 TABLET, FILM COATED ORAL DAILY
COMMUNITY
Start: 2025-08-19 | End: 2025-08-24

## 2025-08-26 ENCOUNTER — TELEPHONE (OUTPATIENT)
Dept: CARDIOLOGY | Facility: MEDICAL CENTER | Age: 75
End: 2025-08-26
Payer: MEDICARE

## 2025-08-27 ENCOUNTER — HOSPITAL ENCOUNTER (OUTPATIENT)
Facility: MEDICAL CENTER | Age: 75
End: 2025-08-27
Attending: STUDENT IN AN ORGANIZED HEALTH CARE EDUCATION/TRAINING PROGRAM | Admitting: STUDENT IN AN ORGANIZED HEALTH CARE EDUCATION/TRAINING PROGRAM
Payer: MEDICARE

## 2025-08-27 ENCOUNTER — APPOINTMENT (OUTPATIENT)
Facility: MEDICAL CENTER | Age: 75
End: 2025-08-27
Attending: INTERNAL MEDICINE
Payer: MEDICARE

## 2025-08-27 VITALS
WEIGHT: 222 LBS | OXYGEN SATURATION: 95 % | HEART RATE: 56 BPM | TEMPERATURE: 97.8 F | DIASTOLIC BLOOD PRESSURE: 76 MMHG | BODY MASS INDEX: 28.5 KG/M2 | SYSTOLIC BLOOD PRESSURE: 152 MMHG | RESPIRATION RATE: 16 BRPM

## 2025-08-27 DIAGNOSIS — G45.9 TIA (TRANSIENT ISCHEMIC ATTACK): ICD-10-CM

## 2025-08-27 PROCEDURE — 33285 INSJ SUBQ CAR RHYTHM MNTR: CPT

## 2025-08-27 PROCEDURE — 700101 HCHG RX REV CODE 250: Performed by: STUDENT IN AN ORGANIZED HEALTH CARE EDUCATION/TRAINING PROGRAM

## 2025-08-27 PROCEDURE — 160015 HCHG STAT PREOP MINUTES

## 2025-08-27 PROCEDURE — 160002 HCHG RECOVERY MINUTES (STAT)

## 2025-08-27 PROCEDURE — 160046 HCHG PACU - 1ST 60 MINS PHASE II

## 2025-08-27 RX ORDER — LIDOCAINE HYDROCHLORIDE AND EPINEPHRINE 10; 10 MG/ML; UG/ML
20 INJECTION, SOLUTION INFILTRATION; PERINEURAL ONCE
Status: COMPLETED | OUTPATIENT
Start: 2025-08-27 | End: 2025-08-27

## 2025-08-27 RX ADMIN — LIDOCAINE HYDROCHLORIDE AND EPINEPHRINE 20 ML: 10; 10 INJECTION, SOLUTION INFILTRATION; PERINEURAL at 08:28

## 2025-08-27 ASSESSMENT — PAIN SCALES - GENERAL: PAINLEVEL: NO PAIN

## 2025-08-27 ASSESSMENT — PAIN DESCRIPTION - PAIN TYPE: TYPE: ACUTE PAIN

## 2025-08-27 ASSESSMENT — FIBROSIS 4 INDEX: FIB4 SCORE: 2.91

## 2025-08-28 ENCOUNTER — TELEPHONE (OUTPATIENT)
Dept: CARDIOLOGY | Facility: MEDICAL CENTER | Age: 75
End: 2025-08-28
Payer: MEDICARE

## 2025-09-03 ENCOUNTER — APPOINTMENT (OUTPATIENT)
Dept: CARDIOLOGY | Facility: MEDICAL CENTER | Age: 75
End: 2025-09-03
Payer: MEDICARE

## 2025-09-11 ENCOUNTER — APPOINTMENT (OUTPATIENT)
Dept: CARDIOLOGY | Facility: MEDICAL CENTER | Age: 75
End: 2025-09-11
Attending: INTERNAL MEDICINE
Payer: MEDICARE